# Patient Record
Sex: MALE | Race: WHITE | NOT HISPANIC OR LATINO | Employment: OTHER | URBAN - NONMETROPOLITAN AREA
[De-identification: names, ages, dates, MRNs, and addresses within clinical notes are randomized per-mention and may not be internally consistent; named-entity substitution may affect disease eponyms.]

---

## 2017-01-19 ENCOUNTER — ALLSCRIPTS OFFICE VISIT (OUTPATIENT)
Dept: FAMILY MEDICINE CLINIC | Facility: CLINIC | Age: 62
End: 2017-01-19
Payer: MEDICARE

## 2017-01-19 PROCEDURE — 90837 PSYTX W PT 60 MINUTES: CPT | Performed by: SOCIAL WORKER

## 2017-02-02 ENCOUNTER — ALLSCRIPTS OFFICE VISIT (OUTPATIENT)
Dept: FAMILY MEDICINE CLINIC | Facility: CLINIC | Age: 62
End: 2017-02-02
Payer: MEDICARE

## 2017-02-02 PROCEDURE — 90837 PSYTX W PT 60 MINUTES: CPT | Performed by: SOCIAL WORKER

## 2017-03-23 ENCOUNTER — ALLSCRIPTS OFFICE VISIT (OUTPATIENT)
Dept: FAMILY MEDICINE CLINIC | Facility: CLINIC | Age: 62
End: 2017-03-23
Payer: MEDICARE

## 2017-03-23 ENCOUNTER — APPOINTMENT (OUTPATIENT)
Dept: LAB | Facility: HOSPITAL | Age: 62
End: 2017-03-23
Payer: MEDICARE

## 2017-03-23 DIAGNOSIS — R79.89 OTHER SPECIFIED ABNORMAL FINDINGS OF BLOOD CHEMISTRY: ICD-10-CM

## 2017-03-23 DIAGNOSIS — E78.5 HYPERLIPIDEMIA: ICD-10-CM

## 2017-03-23 LAB — TSH SERPL DL<=0.05 MIU/L-ACNC: 2.58 UIU/ML (ref 0.36–3.74)

## 2017-03-23 PROCEDURE — 90837 PSYTX W PT 60 MINUTES: CPT | Performed by: SOCIAL WORKER

## 2017-03-23 PROCEDURE — 36415 COLL VENOUS BLD VENIPUNCTURE: CPT

## 2017-03-23 PROCEDURE — S0201 PARTIAL HOSPITALIZATION SERV: HCPCS | Performed by: SOCIAL WORKER

## 2017-03-23 PROCEDURE — 84443 ASSAY THYROID STIM HORMONE: CPT

## 2017-03-29 ENCOUNTER — ALLSCRIPTS OFFICE VISIT (OUTPATIENT)
Dept: FAMILY MEDICINE CLINIC | Facility: CLINIC | Age: 62
End: 2017-03-29
Payer: MEDICARE

## 2017-03-29 LAB — HBA1C MFR BLD HPLC: 8.6 %

## 2017-03-29 PROCEDURE — 99213 OFFICE O/P EST LOW 20 MIN: CPT | Performed by: PHYSICIAN ASSISTANT

## 2017-04-06 ENCOUNTER — ALLSCRIPTS OFFICE VISIT (OUTPATIENT)
Dept: FAMILY MEDICINE CLINIC | Facility: CLINIC | Age: 62
End: 2017-04-06
Payer: MEDICARE

## 2017-04-06 PROCEDURE — 90834 PSYTX W PT 45 MINUTES: CPT | Performed by: SOCIAL WORKER

## 2017-08-02 ENCOUNTER — GENERIC CONVERSION - ENCOUNTER (OUTPATIENT)
Dept: OTHER | Facility: OTHER | Age: 62
End: 2017-08-02

## 2017-10-04 ENCOUNTER — ALLSCRIPTS OFFICE VISIT (OUTPATIENT)
Dept: OTHER | Facility: OTHER | Age: 62
End: 2017-10-04

## 2017-10-04 ENCOUNTER — GENERIC CONVERSION - ENCOUNTER (OUTPATIENT)
Dept: OTHER | Facility: OTHER | Age: 62
End: 2017-10-04

## 2017-10-04 DIAGNOSIS — I42.0 DILATED CARDIOMYOPATHY (HCC): ICD-10-CM

## 2017-10-11 ENCOUNTER — GENERIC CONVERSION - ENCOUNTER (OUTPATIENT)
Dept: OTHER | Facility: OTHER | Age: 62
End: 2017-10-11

## 2018-01-10 NOTE — PSYCH
Progress Note  Individual Psychotherapy 60 min minutes provided today  Goals addressed in session:   GOALS- TALKING ABOUT LOSSES WITH FAMILY NAD PROCESSING FEELINGS AROUND THE HOLIDAYS  Art appears and reports feeling very depressed lately, a combo between being off his anti-depressant For 2 weeks due to lack of funds, and the holidays  Art talked bren about his losses over the last 3 yrs- he has not sen his daughter in a year and a half, and his sone has not contacted him in many months  Art expresses how hard it is to be alone  Therapist connected this with the goal to become involved with the [facility], so that he can socialize with others in a positive atmosphere  Art has been accepted into he program and is waiting fo the first time to attend  Current suicide risk is low   Assessment  Mood is depressed- moderately to severely at times, but no SI/HI present  Plan  See in 3 weeks, after xmas-- assess for any worsening of mood  Signatures   Electronically signed by :  Sylwia Mendez; Dec  7 7061  9:39PM EST                       (Author)

## 2018-01-10 NOTE — PROGRESS NOTES
History of Present Illness  Care Coordination Encounter Information:   Type of Encounter: Telephonic    Spoke to Patient  Care Coordination SL Nurse [facility]:   The reason for call is to discuss outreach for follow up/needed services  reached out to patient to see if Adam Kirk, the , has gotten in touch with him yet to reschedule appt  patient states he has not heard from her and he does not have her number  I told him I would call the [facility] to see if Dajuan Conner would be able to get in touch with Adam Bradley to see what is going on  Patient agreeable with that  His only other concern at this time is that he is about to run out of his heart medications and when he tried to get an appt with Dr John Alvarez, pt states office told him nothing is available at this time and to call back  I encouraged him to call back after we end phone call and to make sure he lets them know he needs his prescription called in as he is about to run out  Patients verbally understands and states he will do so  I will call Brianda in the meantime to try to get a hold of Adam Kirk  Late Entry: Called [facility] to speak with Dajuan Conner, was told she is off on Fridays  Called patient back to see if he had spoken with Dr Shawn Argueta office regarding follow up appt and medication refill  no answer, left voicemail for pt to call me back, i did tell him i will call Brianda on Monday as she is not in on Fridays  Will wait for pt to call back  Active Problems    1  Acute sinusitis (461 9) (J01 90)   2  Anxiety (300 00) (F41 9)   3  Chronic obstructive pulmonary disease (496) (J44 9)   4  Depression (311) (F32 9)   5  Dilated cardiomyopathy (425 4) (I42 0)   6  Heart failure, systolic, with acute decompensation (428 23) (I50 23)   7  Hyperlipidemia (272 4) (E78 5)   8  Hypertension (401 9) (I10)   9  Ischemic cardiomyopathy (414 8) (I25 5)   10  Need for influenza vaccination (V04 81) (Z23)   11  Tobacco use (305 1) (Z72 0)   12   Type 2 diabetes mellitus (250 00) (E11 9)    Past Medical History    1  History of Congestive heart failure (428 0) (I50 9)   2  History of congestive cardiomyopathy (V12 59) (Z86 79)   3  History of heartburn (V12 79) (Z87 898)   4  History of myocardial infarction (412) (I25 2)   5  History of shortness of breath (V13 89) (Z87 898)   6  History of wheezing (V12 69) (Z87 898)   7  History of Neck pain (723 1) (M54 2)   8  History of Pneumonia (V12 61)    Surgical History    1  History of Hand Surgery    Family History    1  Family history of arthritis (V17 7) (Z82 61)    2  Family history of diabetes mellitus (V18 0) (Z83 3)   3  Family history of Laryngeal Cancer (V16 2)    4  Family history of diabetes mellitus (V18 0) (Z83 3)    Social History    · Being A Social Drinker   · Current every day smoker (305 1) (F17 200)   · Exercise: Walking   · Former smoker (V15 82) (S87 797)   · No drug use   · Tobacco use (305 1) (Z72 0)   ·     Current Meds    1  Ventolin  (90 Base) MCG/ACT Inhalation Aerosol Solution; INHALE 2 PUFFS   EVERY 4 HOURS AS NEEDED; Therapy: 73QTM1237 to (Last UY:58CGT0563)  Requested for: 42OZR2405 Ordered    2  Citalopram Hydrobromide 20 MG Oral Tablet; take one tablet once daily; Therapy: 40RXJ2105 to (Carli Macedo)  Requested for: 61UNN9779; Last   Rx:04Jan2016 Ordered    3  Aspirin 81 MG Oral Tablet; TAKE 1 TABLET DAILY; Therapy: 62OAK8208 to Recorded    4  Carvedilol 12 5 MG Oral Tablet; TAKE 1 TABLET TWICE DAILY,  WITH MORNING AND   EVENING MEAL; Therapy: 61CCC9329 to (Brandie Murray)  Requested for: 45LAQ5000; Last   Rx:05Jun2014 Ordered    5  FreeStyle InsuLinx Test In Vitro Strip; ONE  EVERY DAY; Therapy: 14PWA6684 to (Last Rx:26Mar2014)  Requested for: 26Mar2014 Ordered    6  Furosemide 40 MG Oral Tablet; TAKE 1 TABLET DAILY AS DIRECTED; Therapy: (NALDTAZD:98LQZ0437) to Recorded   7  Lisinopril 10 MG Oral Tablet; TAKE 1 TABLET DAILY AS DIRECTED;    Therapy: (LKLPRRVH:16AIA6589) to Recorded   8  Nitrostat 0 4 MG Sublingual Tablet Sublingual;   Therapy: (Recorded:04Jan2016) to Recorded    Allergies    1  Penicillins    End of Encounter Meds    1  Ventolin  (90 Base) MCG/ACT Inhalation Aerosol Solution; INHALE 2 PUFFS   EVERY 4 HOURS AS NEEDED; Therapy: 27XVS5938 to (Last WF:74MEY1570)  Requested for: 44MAC9209 Ordered    2  Citalopram Hydrobromide 20 MG Oral Tablet; take one tablet once daily; Therapy: 17DPQ0457 to (Myrla Hollow)  Requested for: 64JXK6825; Last   Rx:04Jan2016 Ordered    3  Aspirin 81 MG Oral Tablet; TAKE 1 TABLET DAILY; Therapy: 74GCZ9845 to Recorded    4  Carvedilol 12 5 MG Oral Tablet; TAKE 1 TABLET TWICE DAILY,  WITH MORNING AND   EVENING MEAL; Therapy: 58WOX3834 to (Rose Mary Carvajal)  Requested for: 58BSK8773; Last   Rx:05Jun2014 Ordered    5  FreeStyle InsuLinx Test In Vitro Strip; ONE  EVERY DAY; Therapy: 06KGR5083 to (Last Rx:26Mar2014)  Requested for: 26Mar2014 Ordered    6  Furosemide 40 MG Oral Tablet; TAKE 1 TABLET DAILY AS DIRECTED; Therapy: (ULKTDIPF:29LYD6624) to Recorded   7  Lisinopril 10 MG Oral Tablet; TAKE 1 TABLET DAILY AS DIRECTED; Therapy: (GZDQKVJC:70MVR5174) to Recorded   8  Nitrostat 0 4 MG Sublingual Tablet Sublingual;   Therapy: (Recorded:04Jan2016) to Recorded    Future Appointments    Date/Time Provider Specialty Site   05/02/2016 09:00 AM Niru Moreno, [address] [address]     Patient Care Team    Care Team Member Role Specialty Office Number   Christa NUNEZ    Cardiology [phone]     Signatures   Electronically signed by : Gilles Biswas RN; Feb 19 2016 10:54AM EST                       (Author)    Electronically signed by : Gilles Biswas RN; Feb 19 2016 11:12AM EST                       (Author)

## 2018-01-10 NOTE — PROGRESS NOTES
History of Present Illness  Care Coordination Encounter Information:   Type of Encounter: Telephonic    Spoke to Other   JESUSITA Lamar  Care Coordination SL Nurse Baltazar Magaña:   The reason for call is to discuss outreach for follow up/needed services  Spoke with Anita Wills LCSW at [facility] about pt financial burden with medications and insurance  She states she will have Ketan Jovel , the , contact the patient to sit with him and fill out insurance paperwork  Name, , and phone number of patient given to Kaycee Vo  Patient called and left voicemail indicating Luci Martinez will be contacting him to help him with his insurance  Will follow up with patient at a later time  Active Problems    1  Acute sinusitis (461 9) (J01 90)   2  Anxiety (300 00) (F41 9)   3  Chronic obstructive pulmonary disease (496) (J44 9)   4  Depression (311) (F32 9)   5  Dilated cardiomyopathy (425 4) (I42 0)   6  Heart failure, systolic, with acute decompensation (428 23) (I50 23)   7  Hyperlipidemia (272 4) (E78 5)   8  Hypertension (401 9) (I10)   9  Ischemic cardiomyopathy (414 8) (I25 5)   10  Need for influenza vaccination (V04 81) (Z23)   11  Tobacco use (305 1) (Z72 0)   12  Type 2 diabetes mellitus (250 00) (E11 9)    Past Medical History    1  History of Congestive heart failure (428 0) (I50 9)   2  History of congestive cardiomyopathy (V12 59) (Z86 79)   3  History of heartburn (V12 79) (Z87 898)   4  History of myocardial infarction (412) (I25 2)   5  History of shortness of breath (V13 89) (Z87 898)   6  History of wheezing (V12 69) (Z87 898)   7  History of Neck pain (723 1) (M54 2)   8  History of Pneumonia (V12 61)    Surgical History    1  History of Hand Surgery    Family History    1  Family history of arthritis (V17 7) (Z82 61)    2  Family history of diabetes mellitus (V18 0) (Z83 3)   3  Family history of Laryngeal Cancer (V16 2)    4   Family history of diabetes mellitus (V18 0) (Z83 3)    Social History    · Being A Social Drinker   · Current every day smoker (305 1) (F17 200)   · Exercise: Walking   · Former smoker (V15 82) (L56 134)   · No drug use   · Tobacco use (305 1) (Z72 0)   ·     Current Meds    1  Ventolin  (90 Base) MCG/ACT Inhalation Aerosol Solution; INHALE 2 PUFFS   EVERY 4 HOURS AS NEEDED; Therapy: 48CWR1450 to (Last MF:21FTC1240)  Requested for: 88TNG1654 Ordered    2  Citalopram Hydrobromide 20 MG Oral Tablet; take one tablet once daily; Therapy: 86TBZ7401 to (Zhen Mercado)  Requested for: 50XBM8844; Last   Rx:04Jan2016 Ordered    3  Aspirin 81 MG Oral Tablet; TAKE 1 TABLET DAILY; Therapy: 09WIO5838 to Recorded    4  Carvedilol 12 5 MG Oral Tablet; TAKE 1 TABLET TWICE DAILY,  WITH MORNING AND   EVENING MEAL; Therapy: 99DVZ9357 to (Miguel Garcia)  Requested for: 94EFL1569; Last   Rx:05Jun2014 Ordered    5  FreeStyle InsuLinx Test In Vitro Strip; ONE  EVERY DAY; Therapy: 56MOF7342 to (Last Rx:26Mar2014)  Requested for: 26Mar2014 Ordered    6  Furosemide 40 MG Oral Tablet; TAKE 1 TABLET DAILY AS DIRECTED; Therapy: (IFPPIMRZ:02GYY7629) to Recorded   7  Lisinopril 10 MG Oral Tablet; TAKE 1 TABLET DAILY AS DIRECTED; Therapy: (IRPYRGTW:41HKF9231) to Recorded   8  Nitrostat 0 4 MG Sublingual Tablet Sublingual;   Therapy: (Recorded:04Jan2016) to Recorded    Allergies    1  Penicillins    End of Encounter Meds    1  Ventolin  (90 Base) MCG/ACT Inhalation Aerosol Solution; INHALE 2 PUFFS   EVERY 4 HOURS AS NEEDED; Therapy: 71MQQ9070 to (Last QX:40GXM0354)  Requested for: 57URU1027 Ordered    2  Citalopram Hydrobromide 20 MG Oral Tablet; take one tablet once daily; Therapy: 75VNI7969 to (Zhen Mercado)  Requested for: 81BBQ4086; Last   Rx:04Jan2016 Ordered    3  Aspirin 81 MG Oral Tablet; TAKE 1 TABLET DAILY; Therapy: 62GKQ9079 to Recorded    4   Carvedilol 12 5 MG Oral Tablet; TAKE 1 TABLET TWICE DAILY,  WITH MORNING AND   EVENING MEAL; Therapy: 03VKQ2923 to (Danay Josephhugh)  Requested for: 12BBH1543; Last   Rx:05Jun2014 Ordered    5  FreeStyle InsuLinx Test In Vitro Strip; ONE  EVERY DAY; Therapy: 35JGX0024 to (Last Rx:26Mar2014)  Requested for: 26Mar2014 Ordered    6  Furosemide 40 MG Oral Tablet; TAKE 1 TABLET DAILY AS DIRECTED; Therapy: (SZWIMQXX:01XXJ3039) to Recorded   7  Lisinopril 10 MG Oral Tablet; TAKE 1 TABLET DAILY AS DIRECTED; Therapy: (PHRYXBPI:23DGQ0425) to Recorded   8  Nitrostat 0 4 MG Sublingual Tablet Sublingual;   Therapy: (DPHSWXDX:45EPH2600) to Recorded    Future Appointments    Date/Time Provider Specialty Site   02/01/2016 09:00 AM Yesika Fernandez, [address] [place] [address]     Patient Care Team    Care Team Member Role Specialty Office Number   Henrietta Kevin NUNEZ    Cardiology [phone]     Signatures   Electronically signed by : Manish Beal RN; Jan 26 2016  3:29PM EST                       (Author)

## 2018-01-11 NOTE — PROGRESS NOTES
History of Present Illness  Care Coordination Encounter Information:   Type of Encounter: Telephonic    Spoke to Patient  Care Coordination SL Nurse Ginger Ruiz:   The reason for call is to discuss outreach for follow up/needed services  Patient states he is doing well at the moment  He reports that he was able to get in touch with the Social Security office, who told him they would be mailing a formal letter to him stating when his child support would be discontinued  Patient was not told the specific date but was told it should be by the end of this month  Patient is still indecisive about [facility] [place]  He reports that [facility], the , gave him a phone number for someone that would be able to help him decrease his monthly rent  He has not called yet but states he will talk to them first before deciding if he would like to continue with [facility] [place]  Patient is currently seeing [facility] every other Wednesday and states he is finding it helpful to talk to someone  He is aware of his appointment with his PCP on 5/2/2016 and has arranged for transportation with [facility]  He is also aware of his appointment with cardiology on 6/1/2016  Patient sounds like he is doing well compared to some of the prior conversations I have had with him  He is in the process of making himself something to eat so I told him I would follow up with him in a couple of weeks  He is agreeable to this  Active Problems    1  Acute sinusitis (461 9) (J01 90)   2  Anxiety (300 00) (F41 9)   3  Chronic obstructive pulmonary disease (496) (J44 9)   4  Depression (311) (F32 9)   5  Dilated cardiomyopathy (425 4) (I42 0)   6  Heart failure, systolic, with acute decompensation (428 23) (I50 23)   7  Hyperlipidemia (272 4) (E78 5)   8  Hypertension (401 9) (I10)   9  Ischemic cardiomyopathy (414 8) (I25 5)   10  Need for influenza vaccination (V04 81) (Z23)   11  Tobacco use (305 1) (Z72 0)   12   Type 2 diabetes mellitus (250 00) (E11 9)    Past Medical History    1  History of Congestive heart failure (428 0) (I50 9)   2  History of congestive cardiomyopathy (V12 59) (Z86 79)   3  History of heartburn (V12 79) (Z87 898)   4  History of myocardial infarction (412) (I25 2)   5  History of shortness of breath (V13 89) (Z87 898)   6  History of wheezing (V12 69) (Z87 898)   7  History of Neck pain (723 1) (M54 2)   8  History of Pneumonia (V12 61)    Surgical History    1  History of Hand Surgery    Family History  Mother    1  Family history of arthritis (V17 7) (Z82 61)  Father    2  Family history of diabetes mellitus (V18 0) (Z83 3)   3  Family history of Laryngeal Cancer (V16 2)  Sister    4  Family history of diabetes mellitus (V18 0) (Z83 3)    Social History    · Being A Social Drinker   · Current every day smoker (305 1) (F17 200)   · Exercise: Walking   · Former smoker (V15 82) (C83 135)   · No drug use   · Tobacco use (305 1) (Z72 0)   ·     Current Meds    1  Ventolin  (90 Base) MCG/ACT Inhalation Aerosol Solution; INHALE 2 PUFFS   EVERY 4 HOURS AS NEEDED; Therapy: 29AHB5209 to (Last NB:12SID1117)  Requested for: 71LKE5791 Ordered    2  Citalopram Hydrobromide 20 MG Oral Tablet; take one tablet once daily; Therapy: 17UHY3442 to (Sabrina Ann)  Requested for: 09OMQ2311; Last   Rx:04Jan2016 Ordered    3  Aspirin 81 MG TABS; TAKE 1 TABLET DAILY; Therapy: 52HKJ2456 to Recorded    4  Furosemide 40 MG Oral Tablet; TAKE 1 TABLET DAILY AS DIRECTED  Requested for:   24GLR1499; Last Rx:29Mar2016 Ordered   5  Nitrostat 0 4 MG Sublingual Tablet Sublingual; DISSOLVE 1 TABLET UNDER THE   TONGUE AS NEEDED FOR CHEST PAIN Q 5 MINUTES X3  IF NO RELIEF   CALL 911  Requested for: 53SXP8942; Last Rx:17Mar2016 Ordered    6  Lisinopril 10 MG Oral Tablet; TAKE 1 TABLET DAILY AS DIRECTED  Requested for:   43BDK9561; Last Rx:29Mar2016 Ordered    7   Carvedilol 3 125 MG Oral Tablet; TAKE 1 TABLET TWICE DAILY,  WITH MORNING AND   EVENING MEAL  Requested for: 11DKK4644; Last Rx:29Mar2016 Ordered    8  FreeStyle InsuLinx Test In Vitro Strip; ONE  EVERY DAY; Therapy: 52AJK6079 to (Last Rx:26Mar2014)  Requested for: 26Mar2014 Ordered   9  MetFORMIN HCl - 1000 MG Oral Tablet; TAKE 1 TABLET TWICE DAILY; Therapy: 76YYP6078 to (Evaluate:15Jun2016); Last Rx:17Mar2016 Ordered    Allergies    1  Penicillins    End of Encounter Meds    1  Ventolin  (90 Base) MCG/ACT Inhalation Aerosol Solution; INHALE 2 PUFFS   EVERY 4 HOURS AS NEEDED; Therapy: 29CMV4602 to (Last NX:07YJZ6230)  Requested for: 86GAQ5114 Ordered    2  Citalopram Hydrobromide 20 MG Oral Tablet; take one tablet once daily; Therapy: 55BCZ0842 to (Juan Daniel Cho)  Requested for: 14OIA1073; Last   Rx:04Jan2016 Ordered    3  Aspirin 81 MG TABS; TAKE 1 TABLET DAILY; Therapy: 47SSK2807 to Recorded    4  Furosemide 40 MG Oral Tablet; TAKE 1 TABLET DAILY AS DIRECTED  Requested for:   75SUL0408; Last Rx:29Mar2016 Ordered   5  Nitrostat 0 4 MG Sublingual Tablet Sublingual; DISSOLVE 1 TABLET UNDER THE   TONGUE AS NEEDED FOR CHEST PAIN Q 5 MINUTES X3  IF NO RELIEF   CALL 911  Requested for: 42TAS9197; Last Rx:17Mar2016 Ordered    6  Lisinopril 10 MG Oral Tablet; TAKE 1 TABLET DAILY AS DIRECTED  Requested for:   26VFB9353; Last Rx:29Mar2016 Ordered    7  Carvedilol 3 125 MG Oral Tablet; TAKE 1 TABLET TWICE DAILY,  WITH MORNING AND   EVENING MEAL  Requested for: 01ATX4423; Last Rx:29Mar2016 Ordered    8  FreeStyle InsuLinx Test In Vitro Strip; ONE  EVERY DAY; Therapy: 61BXY8077 to (Last Rx:26Mar2014)  Requested for: 26Mar2014 Ordered   9  MetFORMIN HCl - 1000 MG Oral Tablet; TAKE 1 TABLET TWICE DAILY; Therapy: 05JND3703 to (Evaluate:15Jun2016); Last Rx:17Mar2016 Ordered    Future Appointments    Date/Time Provider Specialty Site   06/01/2016 11:20 AM ABHINAV Bunn   Cardiology [facility] [place]   05/02/2016 09:00 AM TAMMY Fu [address] [address]     Patient Care Team    Care Team Member Role Specialty Office Number   Blaine Alo NUNEZ    Cardiology [phone]   Electronically signed by : Lee Ann Velasco RN; Apr 28 2016  3:01PM EST                       (Author)

## 2018-01-11 NOTE — PSYCH
Progress Note  Individual Psychotherapy 60 min minutes provided today  Goals addressed in session:   [address]  Art share that he is feeling a little better since his PCP increased his dose of anti-depressant  Art is still going to the [facility] and is finding this very helpful  Art is getting involved in some extra activities through them and is motivated to continue to remain active socially, as he knows how much this helps with his looniness and depression  Art shares that he recently identified a very significant trigger for him- there was a man who was intoxicated at the program and actually came to his house drunk  Art needed to ask him to leave, which he did  Art shares that he recognized that the smell of alcohol reminds him of his  wife as well as times when he was afraid as a child  Therapist reviewed grounding techniques with him and suggested trying tot use imagery for a secure container to place upsetting memories- art is willing ot try this  Current suicide risk is low   Assessment  Mood is a little less depressed- Art is being proactive in remaining socially active  Plan  See biweekly- continue to support processing traumatic events as he is able and maintaining social engagement  Signatures   Electronically signed by : Sandi Hair;  2017  8:13PM EST                       (Author)

## 2018-01-11 NOTE — PSYCH
Progress Note  Individual Psychotherapy 60 min minutes provided today  Goals addressed in session:   [address],  Art relays that he is back on his anti-depressant and feeling a little better, however, he is still very depressed around the holidays  Art talked more about his family and how he misses his son, and is hurt about why he has not contacted him  Art talked about his daughter and his wife  Art shared his questions about how his wife   Art still has a lot of questions about the cause of death  Therapist encouraged Art to talk about his wife's life  Art shared how he really loved Larisa Tracy and how they had many good times together, up until the last couple of years of her life  Art relays that Jesusita's mental status seemed to change, in addition she was drinking a lot more  Art did not have any event that celebrated her life and acknowledges that he never really grieved her death  Art shared that it is helpful to talk about this  Current suicide risk is low   Assessment  Mood is more depressed related to the holidays  No suicidal ideation  Plan  See in 2 weeks- therapist asked Art to consider arranging a memory book and to look for pics of his wife as a way to help him process this loss  Signatures   Electronically signed by :  Sandi Mendez; Dec 24 8891  9:18AM EST                       (Author)

## 2018-01-11 NOTE — PSYCH
Progress Note  Individual Psychotherapy 60 min minutes provided today  Goals addressed in session:   GOALS- PROBLEM SOLVING AND PROCESSING FEELINGS  Art appears and reports feeling depressed- he is currently worried about his electric getting cut off, in addition to not having enough money to get all his heart meds  Art expressed how being in this position makes him feel, and his anger that he has worked hard his whole life and it does not seem fair that when he needs help, he does not qualify  Art is tearful as he shares how he feels judged by others for the position he is in, including his PCP  Therapist affirmed that Art has the right to look for a another Provider if he is not comfortable  Art relays that Jennifer Crane tried to work with [employer] about his electric not getting shut off, but was not able to accomplish this  Therapist recommended that Art bring the form to his cardiologist who he sees next week  Discussed a - Art is in agreement to accepting a - he singed a release and therapist will make a referral   Therapist affirmed to Art that his present position has been out of his control, and that he can be proud of hard he has worked to support his family through the yrs  Therapist also discussed the poss of a volunteer position in a 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

## 2018-01-11 NOTE — PROGRESS NOTES
History of Present Illness  Care Coordination Encounter Information:   Type of Encounter: Telephonic    Spoke to Patient  Care Coordination SL Nurse Pratibha Fleming:   The reason for call is to discuss outreach for follow up/needed services  Reached out to patient to see how he is doing  He states he just got back from the pharmacy and could only afford some of his medications  He was unable to afford his Metformin or Celebrex, which are $18 and $3 respectively  He states he had to sell some of his food stamps to pay for his medications, and now he only has a small amount of food stamps left to go grocery shopping  I attempted to educate him on [employer] and the benefits it can offer to him, but he states the company he is going through is the cheapest one that offers all generic  He states he did leave his PCP office a voicemail to get back to him regarding his Celebrex because that medication is making his disoriented to the time  He will wait for them to call back and if not, will try calling them back tomorrow  He states he will not be able to make his way to the pharmacy now until 4/3/2016 because he does not have any money  He states that Kalina Tucker, the , and Andrew Meléndez, the , have been working with him to help some of the obstacles he has been having  He missed his last appointment with Kalina Tucker on 3/16/2016 because he overslept  He does state he has an upcoming appointment with her next Wednesday 3/23/2016 for an hour and then with Andrew Meléndez for another hour  Patient reports that Andrew Meléndez is trying to help with with some of his financial obstacles, like his bills from [facility] and Kalina Tucker has been working with him for his psychosocial issues  He is just frustrated because he does not qualify for any assistance until he is the age of 71 and because he makes too much money on his social security checks   He is looking for an apartment that is less than $575 so he could save on rent but he says the only one he can find does not have a washer and dryer  He said that would cause more harm than help since he does not drive and would have to carry his laundry to the closest [place] and he will not be able to do that  Patient is dealing with many obstacles, mainly due to not having insurance that he can afford and not qualifying for medical assistance  I encouraged him to keep his appointment with José Miguel Felix and Myra Quick and see if they have any resources or help for him  I also encouraged him to keep his appointment for Dr MONTGOM[facility] since he has not seen his cardiologist in quite some time  He agrees and states he will keep his appointments  I asked patient if it was ok for me to call him next Thursday after his appointment with the social and   He states that would be fine  Will reach out to patient 3/24/2016 to get an update on how he is doing  Active Problems    1  Acute sinusitis (461 9) (J01 90)   2  Anxiety (300 00) (F41 9)   3  Chronic obstructive pulmonary disease (496) (J44 9)   4  Depression (311) (F32 9)   5  Dilated cardiomyopathy (425 4) (I42 0)   6  Heart failure, systolic, with acute decompensation (428 23) (I50 23)   7  Hyperlipidemia (272 4) (E78 5)   8  Hypertension (401 9) (I10)   9  Ischemic cardiomyopathy (414 8) (I25 5)   10  Need for influenza vaccination (V04 81) (Z23)   11  Tobacco use (305 1) (Z72 0)   12  Type 2 diabetes mellitus (250 00) (E11 9)    Past Medical History    1  History of Congestive heart failure (428 0) (I50 9)   2  History of congestive cardiomyopathy (V12 59) (Z86 79)   3  History of heartburn (V12 79) (Z87 898)   4  History of myocardial infarction (412) (I25 2)   5  History of shortness of breath (V13 89) (Z87 898)   6  History of wheezing (V12 69) (Z87 898)   7  History of Neck pain (723 1) (M54 2)   8  History of Pneumonia (V12 61)    Surgical History    1  History of Hand Surgery    Family History    1   Family history of arthritis (V17 7) (Z82 61)    2  Family history of diabetes mellitus (V18 0) (Z83 3)   3  Family history of Laryngeal Cancer (V16 2)    4  Family history of diabetes mellitus (V18 0) (Z83 3)    Social History    · Being A Social Drinker   · Current every day smoker (305 1) (F17 200)   · Exercise: Walking   · Former smoker (V15 82) (S96 283)   · No drug use   · Tobacco use (305 1) (Z72 0)   ·     Current Meds    1  Ventolin  (90 Base) MCG/ACT Inhalation Aerosol Solution; INHALE 2 PUFFS   EVERY 4 HOURS AS NEEDED; Therapy: 00YEX6667 to (Last TZ:70GQM3171)  Requested for: 40ABU9497 Ordered    2  Citalopram Hydrobromide 20 MG Oral Tablet; take one tablet once daily; Therapy: 32ROG1128 to (Carlota Ferguson)  Requested for: 42VEL6540; Last   Rx:04Jan2016 Ordered    3  Aspirin 81 MG Oral Tablet; TAKE 1 TABLET DAILY; Therapy: 38COK6155 to Recorded    4  Furosemide 40 MG Oral Tablet; TAKE 1 TABLET DAILY AS DIRECTED  Requested for:   98UEF2461; Last Rx:17Mar2016 Ordered   5  Nitrostat 0 4 MG Sublingual Tablet Sublingual; DISSOLVE 1 TABLET UNDER THE   TONGUE AS NEEDED FOR CHEST PAIN Q 5 MINUTES X3  IF NO RELIEF   CALL 911  Requested for: 91DEQ7676; Last Rx:17Mar2016 Ordered    6  Lisinopril 10 MG Oral Tablet; TAKE 1 TABLET DAILY AS DIRECTED  Requested for:   89OCK9796; Last Rx:17Mar2016 Ordered    7  Carvedilol 3 125 MG Oral Tablet; TAKE 1 TABLET TWICE DAILY,  WITH MORNING AND   EVENING MEAL  Requested for: 24LBM1903; Last Rx:17Mar2016 Ordered    8  FreeStyle InsuLinx Test In Vitro Strip; ONE  EVERY DAY; Therapy: 26GNS1442 to (Last Rx:26Mar2014)  Requested for: 26Mar2014 Ordered   9  MetFORMIN HCl - 1000 MG Oral Tablet; TAKE 1 TABLET TWICE DAILY; Therapy: 46SPK8899 to (Evaluate:15Jun2016); Last Rx:17Mar2016 Ordered    Allergies    1  Penicillins    End of Encounter Meds    1  Ventolin  (90 Base) MCG/ACT Inhalation Aerosol Solution; INHALE 2 PUFFS   EVERY 4 HOURS AS NEEDED;    Therapy: 06MUO9134 to (Last ESTEVES:07HCV1041)  Requested for: 58PNC6048 Ordered    2  Citalopram Hydrobromide 20 MG Oral Tablet; take one tablet once daily; Therapy: 79PHT6184 to (Arnold Bosch)  Requested for: 42DGA2282; Last   Rx:04Jan2016 Ordered    3  Aspirin 81 MG Oral Tablet; TAKE 1 TABLET DAILY; Therapy: 16DJZ7058 to Recorded    4  Furosemide 40 MG Oral Tablet; TAKE 1 TABLET DAILY AS DIRECTED  Requested for:   28UHL7786; Last Rx:17Mar2016 Ordered   5  Nitrostat 0 4 MG Sublingual Tablet Sublingual; DISSOLVE 1 TABLET UNDER THE   TONGUE AS NEEDED FOR CHEST PAIN Q 5 MINUTES X3  IF NO RELIEF   CALL 911  Requested for: 23RGI3649; Last Rx:17Mar2016 Ordered    6  Lisinopril 10 MG Oral Tablet; TAKE 1 TABLET DAILY AS DIRECTED  Requested for:   95WVL7373; Last Rx:17Mar2016 Ordered    7  Carvedilol 3 125 MG Oral Tablet; TAKE 1 TABLET TWICE DAILY,  WITH MORNING AND   EVENING MEAL  Requested for: 77YNJ2812; Last Rx:17Mar2016 Ordered    8  FreeStyle InsuLinx Test In Vitro Strip; ONE  EVERY DAY; Therapy: 00SDR7157 to (Last Rx:26Mar2014)  Requested for: 26Mar2014 Ordered   9  MetFORMIN HCl - 1000 MG Oral Tablet; TAKE 1 TABLET TWICE DAILY; Therapy: 80GXQ6103 to (Evaluate:15Jun2016); Last Rx:17Mar2016 Ordered    Future Appointments    Date/Time Provider Specialty Site   03/29/2016 08:00 AM ABHINAV Monsivais  Cardiology [facility] CARDIOLOGY [facility]   05/02/2016 09:00 AM Niru Moreno, [address] [place] [address]     Patient Care Team    Care Team Member Role Specialty Office Number   Christa NUNEZ    Cardiology [phone]     Signatures   Electronically signed by : Gilles Biswas RN; Mar 18 2016  1:17PM EST                       (Author)

## 2018-01-11 NOTE — PROGRESS NOTES
History of Present Illness  Care Coordination Encounter Information:   Type of Encounter: Telephonic    Spoke to Patient  Care Coordination SL Nurse [facility]:   The reason for call is to discuss outreach for follow up/needed services  spoke with patient to see if he had picked up his Celexa dose yet, which was ordered 1/4/2016  He states he is having someone pick it up for him next week  I asked if he had spoken to [facility] to Access and he states he will call them again next week because when he tried earlier this week he was put on hold several times and then was hung up on  I will follow up with him on this next week and if he has the same problems, I will give them a call  I asked why he was not able to make Dr Arian Gutierrez appt on 1/18/2016  He states he called too late for [facility] transportation and so he could not make it  He states he will be calling on Monday to reschedule  I told him to make sure as soon as he makes an appointment to call [facility] to have them scheduled also  Patient agrees  Patient states he is unhappy because he received his bill from [employer] for $11,600 and still has a previous bill from [employer] that he is unable to pay  States he is trying everything he can do and is getting nowhere  I told him I would call the , Demetrius Guadarrama, at [address] [place] [address] to see if she has any other suggestions and will call him back  Late Entry: Spoke with Hiro Fuller, from [facility] who states Demetrius Guadarrama is off on Fridays but will be in on Monday  Called patient back to inform him of this  I assured him I will call her Monday and speak to him regarding his situation  He is thankful  I educated him on the importance of seeing Dr Mala Sethi and trying to get in touch with [facility] of Access for his financial issues  Patient states he will call both on Monday and I will follow up with him to see his progress  No other issues or concerns expressed by patient at this time  Will outreach again 1/25/2016  Active Problems    1  Acute sinusitis (461 9) (J01 90)   2  Anxiety (300 00) (F41 9)   3  Chronic obstructive pulmonary disease (496) (J44 9)   4  Depression (311) (F32 9)   5  Dilated cardiomyopathy (425 4) (I42 0)   6  Heart failure, systolic, with acute decompensation (428 23) (I50 23)   7  Hyperlipidemia (272 4) (E78 5)   8  Hypertension (401 9) (I10)   9  Ischemic cardiomyopathy (414 8) (I25 5)   10  Need for influenza vaccination (V04 81) (Z23)   11  Tobacco use (305 1) (Z72 0)   12  Type 2 diabetes mellitus (250 00) (E11 9)    Past Medical History    1  History of Congestive heart failure (428 0) (I50 9)   2  History of congestive cardiomyopathy (V12 59) (Z86 79)   3  History of heartburn (V12 79) (Z87 898)   4  History of myocardial infarction (412) (I25 2)   5  History of shortness of breath (V13 89) (Z87 898)   6  History of wheezing (V12 69) (Z87 898)   7  History of Neck pain (723 1) (M54 2)   8  History of Pneumonia (V12 61)    Surgical History    1  History of Hand Surgery    Family History    1  Family history of arthritis (V17 7) (Z82 61)    2  Family history of diabetes mellitus (V18 0) (Z83 3)   3  Family history of Laryngeal Cancer (V16 2)    4  Family history of diabetes mellitus (V18 0) (Z83 3)    Social History    · Being A Social Drinker   · Current every day smoker (305 1) (F17 200)   · Exercise: Walking   · Former smoker (V15 82) (O21 752)   · No drug use   · Tobacco use (305 1) (Z72 0)   ·     Current Meds    1  Ventolin  (90 Base) MCG/ACT Inhalation Aerosol Solution; INHALE 2 PUFFS   EVERY 4 HOURS AS NEEDED; Therapy: 68ERO6940 to (Last KB:24AUQ7593)  Requested for: 84NON1431 Ordered    2  Citalopram Hydrobromide 20 MG Oral Tablet; take one tablet once daily; Therapy: 58JKQ8058 to (Ale Fat)  Requested for: 36FQA8225; Last   Rx:04Jan2016 Ordered    3  Aspirin 81 MG Oral Tablet; TAKE 1 TABLET DAILY; Therapy: 35AZG0433 to Recorded    4   Carvedilol 12 5 MG Oral Tablet; TAKE 1 TABLET TWICE DAILY,  WITH MORNING AND   EVENING MEAL; Therapy: 01WGO1140 to (Charlene Holland)  Requested for: 54HTF6250; Last   Rx:05Jun2014 Ordered    5  FreeStyle InsuLinx Test In Vitro Strip; ONE  EVERY DAY; Therapy: 77VQE6581 to (Last Rx:26Mar2014)  Requested for: 26Mar2014 Ordered    6  Furosemide 40 MG Oral Tablet; TAKE 1 TABLET DAILY AS DIRECTED; Therapy: (YOIXAWJE:26XAT8673) to Recorded   7  Lisinopril 10 MG Oral Tablet; TAKE 1 TABLET DAILY AS DIRECTED; Therapy: (QOTELFQQ:04EGF0180) to Recorded   8  Nitrostat 0 4 MG Sublingual Tablet Sublingual;   Therapy: (Recorded:04Jan2016) to Recorded    Allergies    1  Penicillins    End of Encounter Meds    1  Ventolin  (90 Base) MCG/ACT Inhalation Aerosol Solution; INHALE 2 PUFFS   EVERY 4 HOURS AS NEEDED; Therapy: 92ALZ9696 to (Last ZT:96OHR0990)  Requested for: 75LHE5485 Ordered    2  Citalopram Hydrobromide 20 MG Oral Tablet; take one tablet once daily; Therapy: 25HFM0273 to (Bertrum Killer)  Requested for: 03IFL5634; Last   Rx:04Jan2016 Ordered    3  Aspirin 81 MG Oral Tablet; TAKE 1 TABLET DAILY; Therapy: 23NHD4869 to Recorded    4  Carvedilol 12 5 MG Oral Tablet; TAKE 1 TABLET TWICE DAILY,  WITH MORNING AND   EVENING MEAL; Therapy: 10QTB2533 to (Charlene Holland)  Requested for: 92SOX5052; Last   Rx:05Jun2014 Ordered    5  FreeStyle InsuLinx Test In Vitro Strip; ONE  EVERY DAY; Therapy: 04WRL6316 to (Last Rx:26Mar2014)  Requested for: 26Mar2014 Ordered    6  Furosemide 40 MG Oral Tablet; TAKE 1 TABLET DAILY AS DIRECTED; Therapy: (MFTGCGVO:01TQG2272) to Recorded   7  Lisinopril 10 MG Oral Tablet; TAKE 1 TABLET DAILY AS DIRECTED; Therapy: (LBYNAOEE:98BGS3724) to Recorded   8   Nitrostat 0 4 MG Sublingual Tablet Sublingual;   Therapy: (SPOZNCMQ:66KRT3312) to Recorded    Future Appointments    Date/Time Provider Specialty Site   02/01/2016 09:00 AM Harjit Hassan, [address] [place] [address] [address]     Patient Care Team    Care Team Member Role Specialty Office Number   Kassandra William NUNEZ    Cardiology [phone]   Electronically signed by : Major Singh RN; Jan 22 2016 10:44AM EST                       (Author)    Electronically signed by : Major Singh RN; Jan 22 2016 10:48AM EST                       (Author)

## 2018-01-12 NOTE — PROGRESS NOTES
History of Present Illness  Care Coordination Encounter Information:   Type of Encounter: Telephonic    Spoke to Patient  Care Coordination SL Nurse [facility]:   The reason for call is to discuss outreach for follow up/needed services  I was reached out by Brent Mercedes, HF Care Coordinator, who states this patient reached out to her stating he has no electricity  She states patient told her his electric bill is $171 and he does not get paid until a later time  She has not spoken to patient since December of 2015 but I did update her on how I have been speaking with him, as well as Doni Smith from case management and Brianda from social work  I informed Forest that I will reach out to Doni Smith to see if there is any assistance we can get for this patient regarding his electricity bill  I reached out to Doni Smith and updated her on my conversation with Forest  She believes [facility] electric company does help patients who have high medical bills  She states she will talk to her supervisor, Maranda Sahu, and get call me back with information  I will wait to hear from her before calling patient  Late Entry: 5/19/2016 1455- Received a call back from Doni Smith stating she spoke with [facility] customer service  They told her they could not give her any information since she was not the account sims and would have to speak directly to the patient  I obtained their number from Doni Smith and thanked her for the update  I attempted to call patient who no answer  I did leave a voicemail and will attempt to call again tomorrow, 5/20/2016, if he does not call me back  Active Problems    1  Acute sinusitis (461 9) (J01 90)   2  Anxiety (300 00) (F41 9)   3  Chronic obstructive pulmonary disease (496) (J44 9)   4  Depression (311) (F32 9)   5  Dilated cardiomyopathy (425 4) (I42 0)   6  Encounter for screening for malignant neoplasm of colon (V76 51) (Z12 11)   7  Heart failure, systolic, with acute decompensation (428 23) (I50 23)   8   Hyperlipidemia (272  4) (E78 5)   9  Hypertension (401 9) (I10)   10  Ischemic cardiomyopathy (414 8) (I25 5)   11  Need for influenza vaccination (V04 81) (Z23)   12  Tobacco use (305 1) (Z72 0)   13  Type 2 diabetes mellitus (250 00) (E11 9)    Past Medical History    1  History of Congestive heart failure (428 0) (I50 9)   2  History of congestive cardiomyopathy (V12 59) (Z86 79)   3  History of heartburn (V12 79) (Z87 898)   4  History of myocardial infarction (412) (I25 2)   5  History of shortness of breath (V13 89) (Z87 898)   6  History of wheezing (V12 69) (Z87 898)   7  History of Neck pain (723 1) (M54 2)   8  History of Pneumonia (V12 61)    Surgical History    1  History of Hand Surgery    Family History  Mother    1  Family history of arthritis (V17 7) (Z82 61)  Father    2  Family history of diabetes mellitus (V18 0) (Z83 3)   3  Family history of Laryngeal Cancer (V16 2)  Sister    4  Family history of diabetes mellitus (V18 0) (Z83 3)    Social History    · Being A Social Drinker   · Current every day smoker (305 1) (F17 200)   · Exercise: Walking   · Former smoker (V15 82) (U16 681)   · No drug use   · Tobacco use (305 1) (Z72 0)   ·     Current Meds    1  Ventolin  (90 Base) MCG/ACT Inhalation Aerosol Solution; INHALE 2 PUFFS   EVERY 4 HOURS AS NEEDED; Therapy: 44DAO7601 to (Last SZ:81JVQ0406)  Requested for: 43HQL6370 Ordered    2  Citalopram Hydrobromide 20 MG Oral Tablet; take one tablet once daily; Therapy: 48VJN4719 to (Michael Herman)  Requested for: 85MEV2228; Last   Rx:14Tds4123 Ordered    3  Aspirin 81 MG TABS; TAKE 1 TABLET DAILY; Therapy: 90PPB1935 to Recorded    4  Furosemide 40 MG Oral Tablet; TAKE 1 TABLET DAILY AS DIRECTED  Requested for:   71HER9194; Last Rx:29Mar2016 Ordered   5   Nitrostat 0 4 MG Sublingual Tablet Sublingual; DISSOLVE 1 TABLET UNDER THE   TONGUE AS NEEDED FOR CHEST PAIN Q 5 MINUTES X3  IF NO RELIEF   CALL 911  Requested for: 54RIB9464; Last Rx:17Mar2016 Ordered    6  Lisinopril 10 MG Oral Tablet; TAKE 1 TABLET DAILY AS DIRECTED  Requested for:   74JFP9025; Last Rx:29Mar2016 Ordered    7  Carvedilol 3 125 MG Oral Tablet; TAKE 1 TABLET TWICE DAILY,  WITH MORNING AND   EVENING MEAL  Requested for: 27WQY3069; Last Rx:29Mar2016 Ordered    8  FreeStyle InsuLinx Test In Vitro Strip; ONE  EVERY DAY; Therapy: 58FMC5016 to (Last Rx:26Mar2014)  Requested for: 26Mar2014 Ordered   9  MetFORMIN HCl - 1000 MG Oral Tablet; TAKE 1 TABLET TWICE DAILY; Therapy: 61LGO5602 to (Artist Tonya)  Requested for: 27GSA1711; Last   HH:58PVP0478 Ordered    Allergies    1  Penicillins    End of Encounter Meds    1  Ventolin  (90 Base) MCG/ACT Inhalation Aerosol Solution; INHALE 2 PUFFS   EVERY 4 HOURS AS NEEDED; Therapy: 42SAJ0635 to (Last FQ:89SCD9759)  Requested for: 49RMC0311 Ordered    2  Citalopram Hydrobromide 20 MG Oral Tablet; take one tablet once daily; Therapy: 33UTM2137 to (Artist Macon)  Requested for: 01SZT6896; Last   Rx:86Zwr8380 Ordered    3  Aspirin 81 MG TABS; TAKE 1 TABLET DAILY; Therapy: 66GIS1702 to Recorded    4  Furosemide 40 MG Oral Tablet; TAKE 1 TABLET DAILY AS DIRECTED  Requested for:   38DJA3758; Last Rx:29Mar2016 Ordered   5  Nitrostat 0 4 MG Sublingual Tablet Sublingual; DISSOLVE 1 TABLET UNDER THE   TONGUE AS NEEDED FOR CHEST PAIN Q 5 MINUTES X3  IF NO RELIEF   CALL 911  Requested for: 29KGM0662; Last Rx:17Mar2016 Ordered    6  Lisinopril 10 MG Oral Tablet; TAKE 1 TABLET DAILY AS DIRECTED  Requested for:   80LIH8461; Last Rx:29Mar2016 Ordered    7  Carvedilol 3 125 MG Oral Tablet; TAKE 1 TABLET TWICE DAILY,  WITH MORNING AND   EVENING MEAL  Requested for: 18UZS6236; Last Rx:29Mar2016 Ordered    8  FreeStyle InsuLinx Test In Vitro Strip; ONE  EVERY DAY; Therapy: 16TQF3528 to (Last Rx:26Mar2014)  Requested for: 26Mar2014 Ordered   9  MetFORMIN HCl - 1000 MG Oral Tablet; TAKE 1 TABLET TWICE DAILY;    Therapy: 65DWZ6362 to (Vivian Jackson)  Requested for: 35NEV6238; Last   SY:19QPV0056 Ordered    Future Appointments    Date/Time Provider Specialty Site   06/01/2016 11:20 AM ABHINAV San  Cardiology [facility] CARDIOLOGY [place]   08/02/2016 10:00 AM Carie Curry, [address] [place] [address]     Patient Care Team    Care Team Member Role Specialty Office Number   Ольга NUNEZ    Cardiology [phone]     Signatures   Electronically signed by : Blake Crandall RN; May 19 2016  2:06PM EST                       (Author)    Electronically signed by : Blake Crandall RN; May 19 2016  3:05PM EST                       (Author)

## 2018-01-12 NOTE — PSYCH
Progress Note  Individual Psychotherapy 30 min minutes provided today  Goals addressed in session:   GOALS- Processing feelings/ exploring resources  Art shares that his  has been more helpful lately, as he will be taking Art to a food back in the area  Art has decided that for now, if he can specific with the  about he can do to help him, he will keep him  Art did speak up to him about what he was saying that was so upsetting  Art accomplished the goal of seeing a new PCP today, which helped him feel much better as well  Art expressing his frustration that when he gets Medicare, it will actually cost him more than what he is paying fr health care now  Art struggles with a feeling of defeat that he can never "cath a break"  Discussed a referral to the [employer]- apparently this is already in the works  Current suicide risk is low   Assessment  Mood is stable with some depression and frustration with his difficult financial situation  Plan  see in 3 weeks- For support and ongoing encouragement to become involved ed in a positive social activity and to keep exploring options fo help  Signatures   Electronically signed by :  Sandi Youngblood; Aug 14 5922  3:39PM EST                       (Author)

## 2018-01-12 NOTE — MISCELLANEOUS
Message  Call from pt asking for refill of cardiac meds  Had last OV with Dr Dontrell Muñoz 8/1/14  Had CHF and NSTEMI 11/15 and Dr Dontrell Muñoz saw pt on consult during that admission  Pt informs that he has had trouble finding transportation to doctor visits  He does have appt 3/29/16 to see Dr Dontrell Muñoz in the [place] office  Paged and spoke with Dr Dontrell Muñoz re: refill of cardiac meds  Per Dr Dontrell Muñoz okay to fill meds as he has been taking them  Spoke with pt and he confirmed meds and dosages  See updated med list in [employer]  Rxs sent to pharmacy  Active Problems    1  Acute sinusitis (461 9) (J01 90)   2  Anxiety (300 00) (F41 9)   3  Chronic obstructive pulmonary disease (496) (J44 9)   4  Depression (311) (F32 9)   5  Dilated cardiomyopathy (425 4) (I42 0)   6  Heart failure, systolic, with acute decompensation (428 23) (I50 23)   7  Hyperlipidemia (272 4) (E78 5)   8  Hypertension (401 9) (I10)   9  Ischemic cardiomyopathy (414 8) (I25 5)   10  Need for influenza vaccination (V04 81) (Z23)   11  Tobacco use (305 1) (Z72 0)   12  Type 2 diabetes mellitus (250 00) (E11 9)    Current Meds   1  Aspirin 81 MG Oral Tablet; TAKE 1 TABLET DAILY; Therapy: 22KBP2319 to Recorded   2  Carvedilol 3 125 MG Oral Tablet; TAKE 1 TABLET TWICE DAILY,  WITH MORNING AND   EVENING MEAL  Requested for: 59ZGH0893; Last Rx:17Mar2016; Status: ACTIVE -   Retrospective By Protocol Authorization Ordered   3  Citalopram Hydrobromide 20 MG Oral Tablet; take one tablet once daily; Therapy: 02STH1364 to (Jesus Doctor)  Requested for: 09RVH1699; Last   Rx:04Jan2016 Ordered   4  FreeStyle InsuLinx Test In Vitro Strip; ONE  EVERY DAY; Therapy: 00BTN7031 to (Last Rx:26Mar2014)  Requested for: 26Mar2014 Ordered   5  Furosemide 40 MG Oral Tablet; TAKE 1 TABLET DAILY AS DIRECTED  Requested for:   30DHM4394; Last Rx:17Mar2016; Status: ACTIVE - Retrospective By Protocol   Authorization Ordered   6   Lisinopril 10 MG Oral Tablet; TAKE 1 TABLET DAILY AS DIRECTED  Requested for:   93JBL7879; Last Rx:17Mar2016; Status: ACTIVE - Retrospective Authorization Ordered   7  MetFORMIN HCl - 1000 MG Oral Tablet; TAKE 1 TABLET TWICE DAILY; Therapy: 29XUP5444 to (Evaluate:15Jun2016); Last Rx:17Mar2016 Ordered   8  Nitrostat 0 4 MG Sublingual Tablet Sublingual; DISSOLVE 1 TABLET UNDER THE   TONGUE AS NEEDED FOR CHEST PAIN Q 5 MINUTES X3  IF NO RELIEF   CALL 911  Requested for: 55SWE1133; Last Rx:17Mar2016; Status: ACTIVE -   Retrospective Authorization Ordered   9  Ventolin  (90 Base) MCG/ACT Inhalation Aerosol Solution; INHALE 2 PUFFS   EVERY 4 HOURS AS NEEDED; Therapy: 29XNI6639 to (Last DV:92LEY3768)  Requested for: 12DFZ6934 Ordered    Allergies    1   Penicillins    Signatures   Electronically signed by : Tina Abreu RN; Mar 17 2016  1:41PM EST                       (Author)

## 2018-01-12 NOTE — PROGRESS NOTES
History of Present Illness  Care Coordination Encounter Information:   Type of Encounter: Telephonic    Spoke to Other   Cassius Leung, case management  Care Coordination  Nurse Ginger Ruiz:   The reason for call is to discuss outreach for follow up/needed services  spoke with Cassius Leung from case management with [facility] who states she has met with patient and spoke with supervisor and they are getting him therapy for his depression  She does state that, unfortunately, patient does not qualify for medical assistance and can not get Medicare for another two years, but she is hoping the therapy will help him be more positive as his depression is affecting his lifestyle tremendously  She states they are doing what they can to help this patient  I am very thankful and did let her know  I will follow up with patient to see how he is doing  Active Problems    1  Acute sinusitis (461 9) (J01 90)   2  Anxiety (300 00) (F41 9)   3  Chronic obstructive pulmonary disease (496) (J44 9)   4  Depression (311) (F32 9)   5  Dilated cardiomyopathy (425 4) (I42 0)   6  Heart failure, systolic, with acute decompensation (428 23) (I50 23)   7  Hyperlipidemia (272 4) (E78 5)   8  Hypertension (401 9) (I10)   9  Ischemic cardiomyopathy (414 8) (I25 5)   10  Need for influenza vaccination (V04 81) (Z23)   11  Tobacco use (305 1) (Z72 0)   12  Type 2 diabetes mellitus (250 00) (E11 9)    Past Medical History    1  History of Congestive heart failure (428 0) (I50 9)   2  History of congestive cardiomyopathy (V12 59) (Z86 79)   3  History of heartburn (V12 79) (Z87 898)   4  History of myocardial infarction (412) (I25 2)   5  History of shortness of breath (V13 89) (Z87 898)   6  History of wheezing (V12 69) (Z87 898)   7  History of Neck pain (723 1) (M54 2)   8  History of Pneumonia (V12 61)    Surgical History    1  History of Hand Surgery    Family History    1  Family history of arthritis (V17 7) (Z82 61)    2  Family history of diabetes mellitus (V18 0) (Z83 3)   3  Family history of Laryngeal Cancer (V16 2)    4  Family history of diabetes mellitus (V18 0) (Z83 3)    Social History    · Being A Social Drinker   · Current every day smoker (305 1) (F17 200)   · Exercise: Walking   · Former smoker (V15 82) (B79 403)   · No drug use   · Tobacco use (305 1) (Z72 0)   ·     Current Meds    1  Ventolin  (90 Base) MCG/ACT Inhalation Aerosol Solution; INHALE 2 PUFFS   EVERY 4 HOURS AS NEEDED; Therapy: 26CXK6578 to (Last VL:46BQV7137)  Requested for: 39MDD0789 Ordered    2  Citalopram Hydrobromide 20 MG Oral Tablet; take one tablet once daily; Therapy: 49AMU5105 to (Lizzy Staple)  Requested for: 78ORY2224; Last   Rx:04Jan2016 Ordered    3  Aspirin 81 MG Oral Tablet; TAKE 1 TABLET DAILY; Therapy: 34WYB7393 to Recorded    4  Carvedilol 12 5 MG Oral Tablet; TAKE 1 TABLET TWICE DAILY,  WITH MORNING AND   EVENING MEAL; Therapy: 91UPH2404 to (Ardelle Maple)  Requested for: 39ZAA0697; Last   Rx:05Jun2014 Ordered    5  FreeStyle InsuLinx Test In Vitro Strip; ONE  EVERY DAY; Therapy: 44TFB8287 to (Last Rx:26Mar2014)  Requested for: 26Mar2014 Ordered    6  Furosemide 40 MG Oral Tablet; TAKE 1 TABLET DAILY AS DIRECTED; Therapy: (YKPNXQHN:26PWU8580) to Recorded   7  Lisinopril 10 MG Oral Tablet; TAKE 1 TABLET DAILY AS DIRECTED; Therapy: (AKYBGLBK:18VMD6039) to Recorded   8  Nitrostat 0 4 MG Sublingual Tablet Sublingual;   Therapy: (Recorded:04Jan2016) to Recorded    Allergies    1  Penicillins    End of Encounter Meds    1  Ventolin  (90 Base) MCG/ACT Inhalation Aerosol Solution; INHALE 2 PUFFS   EVERY 4 HOURS AS NEEDED; Therapy: 87HLQ0621 to (Last YO:87WDY0398)  Requested for: 90FGF3823 Ordered    2  Citalopram Hydrobromide 20 MG Oral Tablet; take one tablet once daily; Therapy: 74REX4330 to (Lizzy Staple)  Requested for: 61ZOF4552; Last   Rx:04Jan2016 Ordered    3   Aspirin 81 MG Oral Tablet; TAKE 1 TABLET DAILY; Therapy: 10LQC2045 to Recorded    4  Carvedilol 12 5 MG Oral Tablet; TAKE 1 TABLET TWICE DAILY,  WITH MORNING AND   EVENING MEAL; Therapy: 90FNK3236 to (Hilda Mejía)  Requested for: 15DKA4732; Last   Rx:05Jun2014 Ordered    5  FreeStyle InsuLinx Test In Vitro Strip; ONE  EVERY DAY; Therapy: 08IQU2128 to (Last Rx:26Mar2014)  Requested for: 26Mar2014 Ordered    6  Furosemide 40 MG Oral Tablet; TAKE 1 TABLET DAILY AS DIRECTED; Therapy: (OCBQYTBA:83QVH8450) to Recorded   7  Lisinopril 10 MG Oral Tablet; TAKE 1 TABLET DAILY AS DIRECTED; Therapy: (QFXRRBOM:72HMU2451) to Recorded   8  Nitrostat 0 4 MG Sublingual Tablet Sublingual;   Therapy: (Recorded:04Jan2016) to Recorded    Future Appointments    Date/Time Provider Specialty Site   05/02/2016 09:00 AM [place] [address]     Patient Care Team    Care Team Member Role Specialty Office Number   Yadira NUNEZ    Cardiology [phone]     Signatures   Electronically signed by : Faby Valdes RN; Mar  2 2016  2:48PM EST                       (Author)

## 2018-01-12 NOTE — PROGRESS NOTES
History of Present Illness  Care Coordination Encounter Information:   Type of Encounter: Telephonic    Spoke to Patient  Care Coordination SL Nurse [facility]:   The reason for call is to discuss outreach for follow up/needed services  spoke with patient to follow up on  ruchi  Patient states he had an appointment with Tonia Garnica 2/10/2016 but she cancelled and told him she would get back to him to schedule another appointment  Patient was able to get his Celexa prescription filled and is taking that now  He states he has still gotten nowhere with [facility] calling him back  I told him I had spoken to the , Melissa, regarding that issue and hopefully she relayed that message to Ajay  I told him I would call back next week to see if Ajay has gotten in touch with him for a follow up appointment  He is thankful for that  States he has no other issues or concerns at this time  Will follow up next week  Active Problems    1  Acute sinusitis (461 9) (J01 90)   2  Anxiety (300 00) (F41 9)   3  Chronic obstructive pulmonary disease (496) (J44 9)   4  Depression (311) (F32 9)   5  Dilated cardiomyopathy (425 4) (I42 0)   6  Heart failure, systolic, with acute decompensation (428 23) (I50 23)   7  Hyperlipidemia (272 4) (E78 5)   8  Hypertension (401 9) (I10)   9  Ischemic cardiomyopathy (414 8) (I25 5)   10  Need for influenza vaccination (V04 81) (Z23)   11  Tobacco use (305 1) (Z72 0)   12  Type 2 diabetes mellitus (250 00) (E11 9)    Past Medical History    1  History of Congestive heart failure (428 0) (I50 9)   2  History of congestive cardiomyopathy (V12 59) (Z86 79)   3  History of heartburn (V12 79) (Z87 898)   4  History of myocardial infarction (412) (I25 2)   5  History of shortness of breath (V13 89) (Z87 898)   6  History of wheezing (V12 69) (Z87 898)   7  History of Neck pain (723 1) (M54 2)   8  History of Pneumonia (V12 61)    Surgical History    1   History of Hand Surgery    Family History    1  Family history of arthritis (V17 7) (Z82 61)    2  Family history of diabetes mellitus (V18 0) (Z83 3)   3  Family history of Laryngeal Cancer (V16 2)    4  Family history of diabetes mellitus (V18 0) (Z83 3)    Social History    · Being A Social Drinker   · Current every day smoker (305 1) (F17 200)   · Exercise: Walking   · Former smoker (V15 82) (V40 981)   · No drug use   · Tobacco use (305 1) (Z72 0)   ·     Current Meds    1  Ventolin  (90 Base) MCG/ACT Inhalation Aerosol Solution; INHALE 2 PUFFS   EVERY 4 HOURS AS NEEDED; Therapy: 59FDH1856 to (Last FT:26KUF1865)  Requested for: 66MRU7075 Ordered    2  Citalopram Hydrobromide 20 MG Oral Tablet; take one tablet once daily; Therapy: 39RGH2399 to (Mary Jo Miramontes)  Requested for: 19JXS6257; Last   Rx:04Jan2016 Ordered    3  Aspirin 81 MG Oral Tablet; TAKE 1 TABLET DAILY; Therapy: 48VTA8032 to Recorded    4  Carvedilol 12 5 MG Oral Tablet; TAKE 1 TABLET TWICE DAILY,  WITH MORNING AND   EVENING MEAL; Therapy: 63ZIE8349 to (Soo Higuera)  Requested for: 39BFM1455; Last   Rx:05Jun2014 Ordered    5  FreeStyle InsuLinx Test In Vitro Strip; ONE  EVERY DAY; Therapy: 08MOW1368 to (Last Rx:26Mar2014)  Requested for: 26Mar2014 Ordered    6  Furosemide 40 MG Oral Tablet; TAKE 1 TABLET DAILY AS DIRECTED; Therapy: (RZLOABBU:61KYD2915) to Recorded   7  Lisinopril 10 MG Oral Tablet; TAKE 1 TABLET DAILY AS DIRECTED; Therapy: (WXFZHQSO:84BKV1477) to Recorded   8  Nitrostat 0 4 MG Sublingual Tablet Sublingual;   Therapy: (Recorded:04Jan2016) to Recorded    Allergies    1  Penicillins    End of Encounter Meds    1  Ventolin  (90 Base) MCG/ACT Inhalation Aerosol Solution; INHALE 2 PUFFS   EVERY 4 HOURS AS NEEDED; Therapy: 95XNV9120 to (Last VV:00XKA7473)  Requested for: 07MOT8786 Ordered    2  Citalopram Hydrobromide 20 MG Oral Tablet; take one tablet once daily;    Therapy: 95NVS4530 to (Matheus Gee)  Requested for: 91YII9043; Last   Rx:04Jan2016 Ordered    3  Aspirin 81 MG Oral Tablet; TAKE 1 TABLET DAILY; Therapy: 14KWR6490 to Recorded    4  Carvedilol 12 5 MG Oral Tablet; TAKE 1 TABLET TWICE DAILY,  WITH MORNING AND   EVENING MEAL; Therapy: 51QLD9161 to (Marshall Vegas)  Requested for: 70JLC4088; Last   Rx:05Jun2014 Ordered    5  FreeStyle InsuLinx Test In Vitro Strip; ONE  EVERY DAY; Therapy: 35BDQ1112 to (Last Rx:26Mar2014)  Requested for: 26Mar2014 Ordered    6  Furosemide 40 MG Oral Tablet; TAKE 1 TABLET DAILY AS DIRECTED; Therapy: (HHTLVZZV:63QRR1650) to Recorded   7  Lisinopril 10 MG Oral Tablet; TAKE 1 TABLET DAILY AS DIRECTED; Therapy: (TXSZENLM:21LVA7051) to Recorded   8  Nitrostat 0 4 MG Sublingual Tablet Sublingual;   Therapy: (Recorded:04Jan2016) to Recorded    Future Appointments    Date/Time Provider Specialty Site   05/02/2016 09:00 AM Nayla Nieves, [address] [place] [address]     Patient Care Team    Care Team Member Role Specialty Office Number   Janae NUNEZ    Cardiology [phone]     Signatures   Electronically signed by : Preston Argueta RN; Feb 11 2016  1:47PM EST                       (Author)

## 2018-01-12 NOTE — PSYCH
History  The patient is being seen for an initial evaluation of depression  The patient is currently experiencing symptoms  depressed mood loss of interests fatigue sense of failure Symptoms:  suicidal ideation  Onset was gradual 2012 day(s) ago  Onset followed personal stress, family stressors, financial stressors, work stressors, anxiety/worry, a disability and wife's suicie in 2012, additional stress due to 2 heart attacks in 2015, along with multiple stressors  The symptoms occur frequently  He describes this as moderate in severity and unchanged  Exacerbating factors:  emotional stress, family stressors and job stressors  No relieving factors are noted  Associated symptoms:  social difficulties, employment difficulties, financial difficulties and limited to no social support  He describes this as moderate in severity and unchanged  Current treatment includes selective serotonin reuptake inhibitors and anti-depresasnt- however, abhilash effected reported  The patient is not currently being treated for this problem  By report, there is good compliance with treatment and poor tolerance of treatment  The initial diagnosis of depression was week(s) ago  Initial presentation included depressed mood, loss of interest, fatigue and sense of failure  Since diagnosis the disease has been unchanged  Recently, the disease has been unchanged  Pertinent medical history:  depression, recent personal loss, bereavement issues and 2 heart attacks in 2015  Family history:  successful suicide and wife 2012  The patient is currently able to do activities of daily living without limitations, unable to work and able to participate in limited social functioning  The patient is being seen for follow-up of anxiety  The patient reports no change in the condition  Interval symptoms:  stable anxiety, stable restlessness and stable depression     Associated symptoms: no grandiosity, no racing thoughts, no periods of euphoria, no hallucinations and no suicidal ideation  Medications:  the patient is adherent to his medication regimen and the patient complains of medication side effects  Smoking: He does not use tobacco  Recently quit smoking  Interval Symptoms: stable depressed mood, stable loss of interest or pleasure in activities, stable loss of energy, stable feelings of worthlessness, stable feelings of guilt and stable anxiety  Associated symptoms include: social difficulties and financial difficulties, but no thoughts of suicide, no delusions, no racing thoughts, no auditory hallucinations, no periods of excess energy, no visual hallucinations, no periods of euphoria and no difficulty with activities of daily living  Social Support: the patient does not have good social support  Medications: the patient is adherent with his medication regimen  the patient complains of medication side effects  Additional History: reports that since taking the anti-depressant, he can't sleep at night, only during the day  The patient is being seen for initial psychiatric evaluation  The history is reported by the patient  He was referred by a primary care provider, Whitley Josue  The reasons for the evaluation include establishing a psychiatric diagnosis and establishing a new psychiatric provider  Past evaluation has included psychiatry evaluation  This problem has not been previously evaluated  Past treatment has included mood stabilizers  This problem has not been previously treated  Suicidal risk:  no suicidal thoughts, no suicidal intention, no suicide attempt(s) and no organized plans  Homicidal risk:  no homicidal thoughts, no homicidal intention, no homicide attempt(s), no organized plan and no command hallucinations  Active Problems    1  Acute sinusitis (461 9) (J01 90)   2  Anxiety (300 00) (F41 9)   3  Chronic obstructive pulmonary disease (496) (J44 9)   4  Depression (311) (F32 9)   5   Dilated cardiomyopathy (425  4) (I42 0)   6  Heart failure, systolic, with acute decompensation (428 23) (I50 23)   7  Hyperlipidemia (272 4) (E78 5)   8  Hypertension (401 9) (I10)   9  Ischemic cardiomyopathy (414 8) (I25 5)   10  Need for influenza vaccination (V04 81) (Z23)   11  Tobacco use (305 1) (Z72 0)   12  Type 2 diabetes mellitus (250 00) (E11 9)    Past Medical History    1  History of Congestive heart failure (428 0) (I50 9)   2  History of congestive cardiomyopathy (V12 59) (Z86 79)   3  History of heartburn (V12 79) (Z87 898)   4  History of myocardial infarction (412) (I25 2)   5  History of shortness of breath (V13 89) (Z87 898)   6  History of wheezing (V12 69) (Z87 898)   7  History of Neck pain (723 1) (M54 2)   8  History of Pneumonia (V12 61)    Past Psychiatric History    Past Psychiatric History: None  Family Psych History    1  Family history of arthritis (V17 7) (Z82 61)    2  Family history of diabetes mellitus (V18 0) (Z83 3)   3  Family history of Laryngeal Cancer (V16 2)    4  Family history of diabetes mellitus (V18 0) (Z83 3)    Social History    · Being A Social Drinker   · Current every day smoker (305 1) (F17 200)   · Exercise: Walking   · Former smoker (V15 82) (Q07 512)   · No drug use   · Tobacco use (305 1) (Z72 0)   ·     Current Meds   1  Aspirin 81 MG Oral Tablet; TAKE 1 TABLET DAILY; Therapy: 26RBV7672 to Recorded   2  Carvedilol 12 5 MG Oral Tablet; TAKE 1 TABLET TWICE DAILY,  WITH MORNING AND   EVENING MEAL; Therapy: 65DDE0274 to (Eda Schroeder)  Requested for: 57ZHR4941; Last   Rx:05Jun2014 Ordered   3  Citalopram Hydrobromide 20 MG Oral Tablet; take one tablet once daily; Therapy: 43HCZ1638 to (Gerlean Gosselin)  Requested for: 15AQN2012; Last   Rx:04Jan2016 Ordered   4  FreeStyle InsuLinx Test In Vitro Strip; ONE  EVERY DAY; Therapy: 78WYX9348 to (Last Rx:26Mar2014)  Requested for: 26Mar2014 Ordered   5   Furosemide 40 MG Oral Tablet; TAKE 1 TABLET DAILY AS DIRECTED; Therapy: (HIIXNCAP:84EQO5666) to Recorded   6  Lisinopril 10 MG Oral Tablet; TAKE 1 TABLET DAILY AS DIRECTED; Therapy: (YFAWITWM:40PPW6778) to Recorded   7  Nitrostat 0 4 MG Sublingual Tablet Sublingual;   Therapy: (Recorded:19Jgp2431) to Recorded   8  Ventolin  (90 Base) MCG/ACT Inhalation Aerosol Solution; INHALE 2 PUFFS   EVERY 4 HOURS AS NEEDED; Therapy: 38IEP0075 to (Last TR:15TQO0125)  Requested for: 42JEQ4519 Ordered    Allergies    1  Penicillins    DSM    Provisional Diagnosis: Depressive Disorder, NOS  Anxiety Disorder, NOS  Assessment    1  Depression (311) (F32 9)   2  Anxiety (300 00) (F41 9)    Toñito is an intelligent man who is able to verbalize his thoughts and feelings well  Toñito states that his main stressors are financial and that his main goal of coming in for therapy is fo help solving the problems related to lack of resources  However, in the course of the evaluation, it is clear that Toñito has endured multiple serious losses that may also be continuing to his anxiety and depression  The losses began in 2012, as stated above, when is wife committed suicide  IN addition, he has been estranged form his daughter, who has CP, and he adult son did not invite him to his wedding  By the end of the evaluation, Toñito agreed that it would also be helpful to talk to someone about these issues, as well  Plan   See weekly for psychotherapy, in addition, casemangaament support will also be needed  Future Appointments    Date/Time Provider Specialty Site   40/09/0165 72:94 PM Evelio Madrid LSW  [address]   05/02/2016 09:00 AM Tone Azul, [address] [place] [address]     Signatures   Electronically signed by :  Zaynab Benavides [place]; Mar 12 9471  1:22PM EST                       (Author)

## 2018-01-13 VITALS
BODY MASS INDEX: 28.04 KG/M2 | SYSTOLIC BLOOD PRESSURE: 128 MMHG | WEIGHT: 185 LBS | HEART RATE: 87 BPM | TEMPERATURE: 96.8 F | OXYGEN SATURATION: 97 % | HEIGHT: 68 IN | DIASTOLIC BLOOD PRESSURE: 78 MMHG | RESPIRATION RATE: 17 BRPM

## 2018-01-13 NOTE — PSYCH
Progress Note  Individual Psychotherapy 60 min minutes provided today  Goals addressed in session:   GOALS- COping skills to manage feelings of depression and hopelessness  Art relays that it has been a difficult 2 weeks- finances are very strained especially since food stamps have been cut  Art did receive a call from [facility] [address]- therapist encouraged Art that having a casemanageer will be helpful a number of ways-   Therapist affirmed to Art that he is doing all he can  Therapist recommended that Art use journaling as a way to process his feelings  Therapist informed Art that it has just come to [facility] attention, that therapists' psychotherapy notes are accessible to other providers- and that the last one, in which he expressed "feeling judged" by his PCP, was also accessed  Art was not surprised as he shared that his cardiologist also told him that he had read the psychotherapy note  THerapist assured Art that this is being corrected and apologized to him for this privacy breach  Art asked to see a diff PCP- therapist arranged fr him to see Fidel Euceda  Current suicide risk is low   Assessment  Mood is depressed and anxious, largely related to Art's financial circumstances  Plan   See in 2 weeks- Art will see Fidel Euceda for an initial visit on the same day  Signatures   Electronically signed by :  Onur Nicholson [place]; Jun 11 375  4:02PM EST                       (Author)

## 2018-01-13 NOTE — PROGRESS NOTES
History of Present Illness  Care Coordination Encounter Information:   Type of Encounter: Telephonic    Spoke to Patient  Care Coordination SL Nurse [employer]:   The reason for call is to discuss outreach for follow up/needed services  Spoke with patient who is clearly upset about his financial status regarding his medical bills  He states his cardiology office called him and made him aware of his outstanding bills  Per patient, they told him they would still see him but patient does not think he will be able to see the doctor  Patient is tearful and states he just does not know what to do  He tried calling his sister, who has custody of his daughter, but claims she just screamed at him because she is not getting child support from him  He has tried calling his son, who lives in [place], several times and he does not answer his phone  Patient denies any thoughts of hurting himself stating "I do want to live " I reassured him by letting him know Glen Ryder was in the process of talking to the billing department to see what his status was and if we could do anything to help him  I confirmed his PCP appointment on 5/2/2016 and patient states he will call transportation to make sure he has a ride for that day  I told patient I will call him back as soon as I get information regarding his bills  Patient is okay with that  Active Problems    1  Acute sinusitis (461 9) (J01 90)   2  Anxiety (300 00) (F41 9)   3  Chronic obstructive pulmonary disease (496) (J44 9)   4  Depression (311) (F32 9)   5  Dilated cardiomyopathy (425 4) (I42 0)   6  Heart failure, systolic, with acute decompensation (428 23) (I50 23)   7  Hyperlipidemia (272 4) (E78 5)   8  Hypertension (401 9) (I10)   9  Ischemic cardiomyopathy (414 8) (I25 5)   10  Need for influenza vaccination (V04 81) (Z23)   11  Tobacco use (305 1) (Z72 0)   12  Type 2 diabetes mellitus (250 00) (E11 9)    Past Medical History    1   History of Congestive heart failure (428 0) (I50 9)   2  History of congestive cardiomyopathy (V12 59) (Z86 79)   3  History of heartburn (V12 79) (Z87 898)   4  History of myocardial infarction (412) (I25 2)   5  History of shortness of breath (V13 89) (Z87 898)   6  History of wheezing (V12 69) (Z87 898)   7  History of Neck pain (723 1) (M54 2)   8  History of Pneumonia (V12 61)    Surgical History    1  History of Hand Surgery    Family History    1  Family history of arthritis (V17 7) (Z82 61)    2  Family history of diabetes mellitus (V18 0) (Z83 3)   3  Family history of Laryngeal Cancer (V16 2)    4  Family history of diabetes mellitus (V18 0) (Z83 3)    Social History    · Being A Social Drinker   · Current every day smoker (305 1) (F17 200)   · Exercise: Walking   · Former smoker (V15 82) (Y75 431)   · No drug use   · Tobacco use (305 1) (Z72 0)   ·     Current Meds    1  Ventolin  (90 Base) MCG/ACT Inhalation Aerosol Solution; INHALE 2 PUFFS   EVERY 4 HOURS AS NEEDED; Therapy: 03XUW5549 to (Last RN:42NYQ7733)  Requested for: 21PPO1973 Ordered    2  Citalopram Hydrobromide 20 MG Oral Tablet; take one tablet once daily; Therapy: 04DDO5412 to (02 37 15 52 25)  Requested for: 37WHO3638; Last   Rx:04Jan2016 Ordered    3  Aspirin 81 MG Oral Tablet; TAKE 1 TABLET DAILY; Therapy: 50GSI5365 to Recorded    4  Furosemide 40 MG Oral Tablet; TAKE 1 TABLET DAILY AS DIRECTED  Requested for:   95KDV5303; Last Rx:17Mar2016 Ordered   5  Nitrostat 0 4 MG Sublingual Tablet Sublingual; DISSOLVE 1 TABLET UNDER THE   TONGUE AS NEEDED FOR CHEST PAIN Q 5 MINUTES X3  IF NO RELIEF   CALL 911  Requested for: 98UDP0935; Last Rx:17Mar2016 Ordered    6  Lisinopril 10 MG Oral Tablet; TAKE 1 TABLET DAILY AS DIRECTED  Requested for:   76XXY8841; Last Rx:17Mar2016 Ordered    7  Carvedilol 3 125 MG Oral Tablet; TAKE 1 TABLET TWICE DAILY,  WITH MORNING AND   EVENING MEAL  Requested for: 42LHE5709; Last Rx:17Mar2016 Ordered    8   FreeStyle InsuLinx Test In Vitro Strip; ONE  EVERY DAY; Therapy: 09FXY9649 to (Last Rx:26Mar2014)  Requested for: 26Mar2014 Ordered   9  MetFORMIN HCl - 1000 MG Oral Tablet; TAKE 1 TABLET TWICE DAILY; Therapy: 74LZL9828 to (Evaluate:15Jun2016); Last Rx:17Mar2016 Ordered    Allergies    1  Penicillins    End of Encounter Meds    1  Ventolin  (90 Base) MCG/ACT Inhalation Aerosol Solution; INHALE 2 PUFFS   EVERY 4 HOURS AS NEEDED; Therapy: 04DLO6196 to (Last WC:76HCH9163)  Requested for: 37SWC6119 Ordered    2  Citalopram Hydrobromide 20 MG Oral Tablet; take one tablet once daily; Therapy: 75IDH3810 to (Talon Leigh)  Requested for: 67FYQ9633; Last   Rx:04Jan2016 Ordered    3  Aspirin 81 MG Oral Tablet; TAKE 1 TABLET DAILY; Therapy: 64XRK5441 to Recorded    4  Furosemide 40 MG Oral Tablet; TAKE 1 TABLET DAILY AS DIRECTED  Requested for:   56TUL6625; Last Rx:17Mar2016 Ordered   5  Nitrostat 0 4 MG Sublingual Tablet Sublingual; DISSOLVE 1 TABLET UNDER THE   TONGUE AS NEEDED FOR CHEST PAIN Q 5 MINUTES X3  IF NO RELIEF   CALL 911  Requested for: 86MAK4344; Last Rx:17Mar2016 Ordered    6  Lisinopril 10 MG Oral Tablet; TAKE 1 TABLET DAILY AS DIRECTED  Requested for:   07IUC8750; Last Rx:17Mar2016 Ordered    7  Carvedilol 3 125 MG Oral Tablet; TAKE 1 TABLET TWICE DAILY,  WITH MORNING AND   EVENING MEAL  Requested for: 12SQF2550; Last Rx:17Mar2016 Ordered    8  FreeStyle InsuLinx Test In Vitro Strip; ONE  EVERY DAY; Therapy: 20UWL1462 to (Last Rx:26Mar2014)  Requested for: 26Mar2014 Ordered   9  MetFORMIN HCl - 1000 MG Oral Tablet; TAKE 1 TABLET TWICE DAILY; Therapy: 18ZJI2240 to (Evaluate:15Jun2016); Last Rx:17Mar2016 Ordered    Future Appointments    Date/Time Provider Specialty Site   03/29/2016 08:00 AM ABHINAV Silva   Cardiology [facility] CARDIOLOGY [facility]   05/02/2016 09:00 AM Marj Mendoza [address] [place] [address]     Patient Care Team    Care Team Member Role Specialty Office Number   Hardik NUNEZ    Cardiology [phone]   Electronically signed by : Carmen Morin RN; Mar 24 2016 11:43AM EST                       (Author)

## 2018-01-13 NOTE — PSYCH
Progress Note  Individual Psychotherapy 30 min minutes provided today  Goals addressed in session:   GOALS- Connecting to community resources/ processing feelings  Toñito came to the session late due to his physical health appt running over  Art appears and reports feeling more depressed lately, as he has not had money to obtain all his prescriptions- he has been pit f his antidepressant for about 3 weeks and he is feeling the difference  in addition, the holidays are very difficult bc he is not with family- he has not heard from his son in a number of months  Art began to cry about this and then stated he tapan rather not talk about this  Therapist helped Art fill out the rest of his [employer] application  Art is looking forward to going tot he [employer] and has been meeting w a casemager regularly about this  Current suicide risk is low   Assessment  Mood is more  depressed which seems to be a combo of running out of medication and the holidays  Plan  See in 2 weeks- therapist reminded Art of an agency that may be able to help w his meds  Signatures   Electronically signed by :  Sandi Mendez; Nov 26 0010  5:10PM EST                       (Author)

## 2018-01-13 NOTE — PROGRESS NOTES
History of Present Illness  Care Coordination Encounter Information:   Type of Encounter: Telephonic    Spoke to Patient  Care Coordination  Nurse [address] [place] [address] [address]:   The reason for call is to discuss outreach for follow up/needed services  Spoke with patient who states he is doing okay  I offered to give him [employer] contact information so he could talk to them about his electricity bill  He states he had spoken to them yesterday and they gave him a three day extension  I asked him how late he was on his payments, to which he replied he is only half a payment late  He states his total bill was $687 and he has given them $100 already and needs to pay the rest within the next three days  His child support ended last month and so he did have additional funds this month  With that money, he bought diabetes medications, diabetic testing supplies, some light bulbs that he needed, and a can opener  He was not expecting his electric bill to be more than $75  He does not get paid until 6/3/2016 but has some phone calls he is going to make to see if he can get any help in paying for his electric bill  I asked him if all his medications are paid for now, to which he stated they are not  He states he took the last of his Furosemide, Lisinopril, and Carvedilol this morning  These medications will cost him $9 at [facility], which he has, but he does not have the money for transportation to get these medications  I will call the  at [facility] to see if patient could be mailed a taxi voucher or get any other type of assistance to be able to obtain his medications  I did relay this information to patient, as far as myself calling to see what assistance I can get for him  He is thankful  Jean Paul Wagner, RN Case Manager at [facility] was called and left a voicemail regarding this patient  I explained any assistance she can provide me would be beneficial  I will wait for call back and update patient  Active Problems    1  Acute sinusitis (461 9) (J01 90)   2  Anxiety (300 00) (F41 9)   3  Chronic obstructive pulmonary disease (496) (J44 9)   4  Depression (311) (F32 9)   5  Dilated cardiomyopathy (425 4) (I42 0)   6  Encounter for screening for malignant neoplasm of colon (V76 51) (Z12 11)   7  Heart failure, systolic, with acute decompensation (428 23) (I50 23)   8  Hyperlipidemia (272 4) (E78 5)   9  Hypertension (401 9) (I10)   10  Ischemic cardiomyopathy (414 8) (I25 5)   11  Need for influenza vaccination (V04 81) (Z23)   12  Tobacco use (305 1) (Z72 0)   13  Type 2 diabetes mellitus (250 00) (E11 9)    Past Medical History    1  History of Congestive heart failure (428 0) (I50 9)   2  History of congestive cardiomyopathy (V12 59) (Z86 79)   3  History of heartburn (V12 79) (Z87 898)   4  History of myocardial infarction (412) (I25 2)   5  History of shortness of breath (V13 89) (Z87 898)   6  History of wheezing (V12 69) (Z87 898)   7  History of Neck pain (723 1) (M54 2)   8  History of Pneumonia (V12 61)    Surgical History    1  History of Hand Surgery    Family History  Mother    1  Family history of arthritis (V17 7) (Z82 61)  Father    2  Family history of diabetes mellitus (V18 0) (Z83 3)   3  Family history of Laryngeal Cancer (V16 2)  Sister    4  Family history of diabetes mellitus (V18 0) (Z83 3)    Social History    · Being A Social Drinker   · Current every day smoker (305 1) (F17 200)   · Exercise: Walking   · Former smoker (V15 82) (B34 754)   · No drug use   · Tobacco use (305 1) (Z72 0)   ·     Current Meds    1  Ventolin  (90 Base) MCG/ACT Inhalation Aerosol Solution; INHALE 2 PUFFS   EVERY 4 HOURS AS NEEDED; Therapy: 55SUE2267 to (Last BA:00BMI1819)  Requested for: 40SSO2606 Ordered    2  Citalopram Hydrobromide 20 MG Oral Tablet; take one tablet once daily; Therapy: 96WXO8214 to (John Citizen)  Requested for: 68KCL3684; Last   Rx:02May2016 Ordered    3   Aspirin 81 MG TABS; TAKE 1 TABLET DAILY; Therapy: 42FYH0768 to Recorded    4  Furosemide 40 MG Oral Tablet; TAKE 1 TABLET DAILY AS DIRECTED  Requested for:   06LHK8095; Last Rx:2016 Ordered   5  Nitrostat 0 4 MG Sublingual Tablet Sublingual; DISSOLVE 1 TABLET UNDER THE   TONGUE AS NEEDED FOR CHEST PAIN Q 5 MINUTES X3  IF NO RELIEF   CALL 911  Requested for: 56MVY3887; Last Rx:2016 Ordered    6  Lisinopril 10 MG Oral Tablet; TAKE 1 TABLET DAILY AS DIRECTED  Requested for:   13TAK2619; Last Rx:2016 Ordered    7  Carvedilol 3 125 MG Oral Tablet; TAKE 1 TABLET TWICE DAILY,  WITH MORNING AND   EVENING MEAL  Requested for: 49TIX8446; Last Rx:2016 Ordered    8  FreeStyle InsuLinx Test In Vitro Strip; ONE  EVERY DAY; Therapy: 34UVO9740 to (Last Rx:2014)  Requested for: 2014 Ordered   9  MetFORMIN HCl - 1000 MG Oral Tablet; TAKE 1 TABLET TWICE DAILY; Therapy: 78CJA3392 to (Portia Kanner)  Requested for: 27LLI1464; Last   K88RVY0631 Ordered    Allergies    1  Penicillins    End of Encounter Meds    1  Ventolin  (90 Base) MCG/ACT Inhalation Aerosol Solution; INHALE 2 PUFFS   EVERY 4 HOURS AS NEEDED; Therapy: 68NUV4967 to (Last LI:19CZV5252)  Requested for: 97CXP6175 Ordered    2  Citalopram Hydrobromide 20 MG Oral Tablet; take one tablet once daily; Therapy: 35VFE0091 to (Portia Kanner)  Requested for: 99IKN2539; Last   Rx:97Epy8189 Ordered    3  Aspirin 81 MG TABS; TAKE 1 TABLET DAILY; Therapy: 14QXU8189 to Recorded    4  Furosemide 40 MG Oral Tablet; TAKE 1 TABLET DAILY AS DIRECTED  Requested for:   86OXK4356; Last Rx:2016 Ordered   5  Nitrostat 0 4 MG Sublingual Tablet Sublingual; DISSOLVE 1 TABLET UNDER THE   TONGUE AS NEEDED FOR CHEST PAIN Q 5 MINUTES X3  IF NO RELIEF   CALL 911  Requested for: 09PWN4058; Last Rx:2016 Ordered    6  Lisinopril 10 MG Oral Tablet; TAKE 1 TABLET DAILY AS DIRECTED  Requested for:   21YLQ1171; Last Rx:2016 Ordered    7  Carvedilol 3 125 MG Oral Tablet; TAKE 1 TABLET TWICE DAILY,  WITH MORNING AND   EVENING MEAL  Requested for: 13FGZ9318; Last Rx:29Mar2016 Ordered    8  FreeStyle InsuLinx Test In Vitro Strip; ONE  EVERY DAY; Therapy: 37HLG0308 to (Last Rx:26Mar2014)  Requested for: 26Mar2014 Ordered   9  MetFORMIN HCl - 1000 MG Oral Tablet; TAKE 1 TABLET TWICE DAILY; Therapy: 76CRF0108 to (Painting Slight)  Requested for: 26MRV9923; Last   JK:05TOV0755 Ordered    Future Appointments    Date/Time Provider Specialty Site   06/01/2016 11:20 AM ABHINAV Park  Cardiology [facility] CARDIOLOGY [facility]   08/02/2016 10:00 AM Rony Still, [address] [place] [address]     Patient Care Team    Care Team Member Role Specialty Office Number   Hardik NUNEZ    Cardiology [phone]     Signatures   Electronically signed by : Carmen Morin RN; May 20 2016 10:31AM EST                       (Author)

## 2018-01-13 NOTE — PROGRESS NOTES
History of Present Illness  Care Coordination Encounter Information:   Type of Encounter: Telephonic    Spoke to Other   [facility] [address]   Care Coordination  Nurse Storm Fear:   The reason for call is to discuss outreach for follow up/needed services  Reached out to [address] twice now, left voicemail to talk to Melissa, LCSW  Will wait for callback  Active Problems    1  Acute sinusitis (461 9) (J01 90)   2  Anxiety (300 00) (F41 9)   3  Chronic obstructive pulmonary disease (496) (J44 9)   4  Depression (311) (F32 9)   5  Dilated cardiomyopathy (425 4) (I42 0)   6  Heart failure, systolic, with acute decompensation (428 23) (I50 23)   7  Hyperlipidemia (272 4) (E78 5)   8  Hypertension (401 9) (I10)   9  Ischemic cardiomyopathy (414 8) (I25 5)   10  Need for influenza vaccination (V04 81) (Z23)   11  Tobacco use (305 1) (Z72 0)   12  Type 2 diabetes mellitus (250 00) (E11 9)    Past Medical History    1  History of Congestive heart failure (428 0) (I50 9)   2  History of congestive cardiomyopathy (V12 59) (Z86 79)   3  History of heartburn (V12 79) (Z87 898)   4  History of myocardial infarction (412) (I25 2)   5  History of shortness of breath (V13 89) (Z87 898)   6  History of wheezing (V12 69) (Z87 898)   7  History of Neck pain (723 1) (M54 2)   8  History of Pneumonia (V12 61)    Surgical History    1  History of Hand Surgery    Family History    1  Family history of arthritis (V17 7) (Z82 61)    2  Family history of diabetes mellitus (V18 0) (Z83 3)   3  Family history of Laryngeal Cancer (V16 2)    4  Family history of diabetes mellitus (V18 0) (Z83 3)    Social History    · Being A Social Drinker   · Current every day smoker (305 1) (F17 200)   · Exercise: Walking   · Former smoker (V15 82) (R69 116)   · No drug use   · Tobacco use (305 1) (Z72 0)   ·     Current Meds    1   Ventolin  (90 Base) MCG/ACT Inhalation Aerosol Solution; INHALE 2 PUFFS   EVERY 4 HOURS AS NEEDED; Therapy: 64EEG2172 to (Last VR:15LDW5335)  Requested for: 89TPB3649 Ordered    2  Citalopram Hydrobromide 20 MG Oral Tablet; take one tablet once daily; Therapy: 13JAE4517 to (Jayjay Chandra)  Requested for: 47ZEE8657; Last   Rx:04Jan2016 Ordered    3  Aspirin 81 MG Oral Tablet; TAKE 1 TABLET DAILY; Therapy: 34QXZ9234 to Recorded    4  Carvedilol 12 5 MG Oral Tablet; TAKE 1 TABLET TWICE DAILY,  WITH MORNING AND   EVENING MEAL; Therapy: 95CXQ0160 to (Turner Jackson)  Requested for: 74SJF3908; Last   Rx:05Jun2014 Ordered    5  FreeStyle InsuLinx Test In Vitro Strip; ONE  EVERY DAY; Therapy: 87RRE0972 to (Last Rx:26Mar2014)  Requested for: 26Mar2014 Ordered    6  Furosemide 40 MG Oral Tablet; TAKE 1 TABLET DAILY AS DIRECTED; Therapy: (PLFLJECC:52BTO9977) to Recorded   7  Lisinopril 10 MG Oral Tablet; TAKE 1 TABLET DAILY AS DIRECTED; Therapy: (DWCMBWXO:14DID5275) to Recorded   8  Nitrostat 0 4 MG Sublingual Tablet Sublingual;   Therapy: (Recorded:04Jan2016) to Recorded    Allergies    1  Penicillins    End of Encounter Meds    1  Ventolin  (90 Base) MCG/ACT Inhalation Aerosol Solution; INHALE 2 PUFFS   EVERY 4 HOURS AS NEEDED; Therapy: 93NHM2072 to (Last SZ:91OKL7822)  Requested for: 53KLP3817 Ordered    2  Citalopram Hydrobromide 20 MG Oral Tablet; take one tablet once daily; Therapy: 20MFB4807 to (Jayjay Chandra)  Requested for: 32KAV0951; Last   Rx:04Jan2016 Ordered    3  Aspirin 81 MG Oral Tablet; TAKE 1 TABLET DAILY; Therapy: 47XUG6759 to Recorded    4  Carvedilol 12 5 MG Oral Tablet; TAKE 1 TABLET TWICE DAILY,  WITH MORNING AND   EVENING MEAL; Therapy: 81KHK7220 to (Turner Jackson)  Requested for: 59UJV3672; Last   Rx:05Jun2014 Ordered    5  FreeStyle InsuLinx Test In Vitro Strip; ONE  EVERY DAY; Therapy: 21RHF2486 to (Last Rx:26Mar2014)  Requested for: 26Mar2014 Ordered    6  Furosemide 40 MG Oral Tablet; TAKE 1 TABLET DAILY AS DIRECTED;    Therapy: (KSSLPYNW:27FAL4234) to Recorded   7  Lisinopril 10 MG Oral Tablet; TAKE 1 TABLET DAILY AS DIRECTED; Therapy: (IWOXGIRE:66RYQ0028) to Recorded   8  Nitrostat 0 4 MG Sublingual Tablet Sublingual;   Therapy: (CKWCQLFF:70DKR3305) to Recorded    Future Appointments    Date/Time Provider Specialty Site   02/01/2016 09:00 AM [facility], [address] [place] [address]     Patient Care Team    Care Team Member Role Specialty Office Number   Hardik NUNEZ    Cardiology [phone]     Signatures   Electronically signed by : Carmen Morin RN; Jan 25 2016  1:01PM EST                       (Author)

## 2018-01-13 NOTE — PROGRESS NOTES
History of Present Illness  Care Coordination Encounter Information:   Type of Encounter: Telephonic    Spoke to Patient  Care Coordination SL Nurse [facility]:   The reason for call is to discuss outreach for follow up/needed services  Left voicemail for patient to see if he needs any extra help other than what Mauricio Hunt, the , is doing for him  I would also like to talk to him about following up with his cardiologist since he was a no show at his last appointment  I will attempt to call again next week if patient does not call me  Active Problems    1  Acute sinusitis (461 9) (J01 90)   2  Anxiety (300 00) (F41 9)   3  Chronic obstructive pulmonary disease (496) (J44 9)   4  Depression (311) (F32 9)   5  Dilated cardiomyopathy (425 4) (I42 0)   6  Heart failure, systolic, with acute decompensation (428 23) (I50 23)   7  Hyperlipidemia (272 4) (E78 5)   8  Hypertension (401 9) (I10)   9  Ischemic cardiomyopathy (414 8) (I25 5)   10  Need for influenza vaccination (V04 81) (Z23)   11  Tobacco use (305 1) (Z72 0)   12  Type 2 diabetes mellitus (250 00) (E11 9)    Past Medical History    1  History of Congestive heart failure (428 0) (I50 9)   2  History of congestive cardiomyopathy (V12 59) (Z86 79)   3  History of heartburn (V12 79) (Z87 898)   4  History of myocardial infarction (412) (I25 2)   5  History of shortness of breath (V13 89) (Z87 898)   6  History of wheezing (V12 69) (Z87 898)   7  History of Neck pain (723 1) (M54 2)   8  History of Pneumonia (V12 61)    Surgical History    1  History of Hand Surgery    Family History    1  Family history of arthritis (V17 7) (Z82 61)    2  Family history of diabetes mellitus (V18 0) (Z83 3)   3  Family history of Laryngeal Cancer (V16 2)    4   Family history of diabetes mellitus (V18 0) (Z83 3)    Social History    · Being A Social Drinker   · Current every day smoker (305 1) (F17 200)   · Exercise: Walking   · Former smoker (V15 82) (T24 268)   · No drug use   · Tobacco use (305 1) (Z72 0)   ·     Current Meds    1  Ventolin  (90 Base) MCG/ACT Inhalation Aerosol Solution; INHALE 2 PUFFS   EVERY 4 HOURS AS NEEDED; Therapy: 96AQU3305 to (Last QC:55JGZ8362)  Requested for: 49CRK2543 Ordered    2  Citalopram Hydrobromide 20 MG Oral Tablet; take one tablet once daily; Therapy: 86AKD5345 to ([phone])  Requested for: 38ADF5821; Last   Rx:04Jan2016 Ordered    3  Aspirin 81 MG Oral Tablet; TAKE 1 TABLET DAILY; Therapy: 69VXK6286 to Recorded    4  Carvedilol 12 5 MG Oral Tablet; TAKE 1 TABLET TWICE DAILY,  WITH MORNING AND   EVENING MEAL; Therapy: 88BIH6280 to (Estelita Liu)  Requested for: 58DAP5919; Last   Rx:05Jun2014 Ordered    5  FreeStyle InsuLinx Test In Vitro Strip; ONE  EVERY DAY; Therapy: 73VQJ8650 to (Last Rx:26Mar2014)  Requested for: 26Mar2014 Ordered    6  Furosemide 40 MG Oral Tablet; TAKE 1 TABLET DAILY AS DIRECTED; Therapy: (MJRXVBLF:97HEF9292) to Recorded   7  Lisinopril 10 MG Oral Tablet; TAKE 1 TABLET DAILY AS DIRECTED; Therapy: (UXBOLJKQ:68CMD0038) to Recorded   8  Nitrostat 0 4 MG Sublingual Tablet Sublingual;   Therapy: (Recorded:04Jan2016) to Recorded    Allergies    1  Penicillins    End of Encounter Meds    1  Ventolin  (90 Base) MCG/ACT Inhalation Aerosol Solution; INHALE 2 PUFFS   EVERY 4 HOURS AS NEEDED; Therapy: 86RAZ0465 to (Last DP:16ZKQ7706)  Requested for: 53ISH0053 Ordered    2  Citalopram Hydrobromide 20 MG Oral Tablet; take one tablet once daily; Therapy: 16HTR0481 to ([phone])  Requested for: 78HCX6583; Last   Rx:04Jan2016 Ordered    3  Aspirin 81 MG Oral Tablet; TAKE 1 TABLET DAILY; Therapy: 92JVO1035 to Recorded    4  Carvedilol 12 5 MG Oral Tablet; TAKE 1 TABLET TWICE DAILY,  WITH MORNING AND   EVENING MEAL; Therapy: 16BOO5094 to (Estelita Liu)  Requested for: 60RTC5529; Last   Rx:05Jun2014 Ordered    5   FreeStyle InsuLinx Test In Vitro Strip; ONE EVERY DAY; Therapy: 63QSJ3280 to (Last Rx:26Mar2014)  Requested for: 26Mar2014 Ordered    6  Furosemide 40 MG Oral Tablet; TAKE 1 TABLET DAILY AS DIRECTED; Therapy: (BYMBAO:67OCI0466) to Recorded   7  Lisinopril 10 MG Oral Tablet; TAKE 1 TABLET DAILY AS DIRECTED; Therapy: (EMHCYDGI:65KHR9756) to Recorded   8  Nitrostat 0 4 MG Sublingual Tablet Sublingual;   Therapy: (Recorded:04Jan2016) to Recorded    Future Appointments    Date/Time Provider Specialty Site   05/02/2016 09:00 AM Santana Ayoub, [address] [place] [address]     Patient Care Team    Care Team Member Role Specialty Office Number   Alice NUNEZ    Cardiology [phone]     Signatures   Electronically signed by : Angie Gustafson RN; Mar  4 2016 11:27AM EST                       (Author)

## 2018-01-14 NOTE — PROGRESS NOTES
History of Present Illness  Care Coordination Encounter Information:   Type of Encounter: Telephonic    Spoke to Patient and Other   Latha Vargas   Care Coordination SL Nurse Ravindra Ledesma:   The reason for call is to discuss outreach for follow up/needed services  Reached out to patient to see if he was able to get his medications over the weekend  Patient states he still does not have his Lisinopril, Furosemide, and Carvedilol  I stated I will attempt to call the RN Case Manager at [facility] again to see if they can be of any help  I did speak with Latha Vargas, , at [facility] who states she will deliver the medications to the patient's home if they can be filled at [address] [place] [address] in [place]  I will call [facility] in [facility] [address], which is where patient gets his medications filled, and check to see if this can be done  I spoke with [address] [place] [address] in [facility], who states I should be able to get these medications filled in any [facility]  I spoke with the pharmacist at the [address] [place] [address] in [place], who states he will have all three medications ready by 4pm today  I relayed this information to Latha Vargas, who states she will be able to deliver them to patient tomorrow morning before 8 am  I relayed this information to patient  The patient also states he spoke with [facility], who states they can give him an extension on his bill if they have a form signed by a doctor or a nurse practitioner  He asked me if I would be able to sign the form for him  I relayed that I am a Registered Nurse so I can not sign the needed form  He stated he did talk to someone at the [address] [place] [address] and they said they would call [facility] but he has not heard from them  I called the [facility] and spoke with Kala Ortiz, the [place], who stated she received the medical form from [facility] and faxed it back to them on Friday with the provider, Tamar Jordan, signature   I called patient back and updated him with his information and told him to call [employer] to make sure they received this form  He states he will call tomorrow morning  He thanked me for my time  Late Entry: I received this email for Hubert Lama stating patient does not meet criteria for extension from [employer]:   Tono Davenport,  I just wanted to throw out a reminder for these [employer] forms Kiran Daniels is requesting  Patients need to meet medical criteria for these forms to be completed, in which he doesn't  [employer] is not authorizing these forms after more than one time use, that's what our office has been told  I did complete the form, but it will not be an ongoing request    Thank you,   Tamar Frank    I attempted to call patient back to relay this information to him and stress that this will be the only time [employer] will authorize these medical forms  However, patient did not answer his phone  I will attempt to call at a later time  Late Entry: patient updated on P[employer] authorization forms and he verbally understands that he will be responsible in paying his future electric bills on time  Active Problems    1  Acute sinusitis (461 9) (J01 90)   2  Anxiety (300 00) (F41 9)   3  Chronic obstructive pulmonary disease (496) (J44 9)   4  Depression (311) (F32 9)   5  Dilated cardiomyopathy (425 4) (I42 0)   6  Encounter for screening for malignant neoplasm of colon (V76 51) (Z12 11)   7  Heart failure, systolic, with acute decompensation (428 23) (I50 23)   8  Hyperlipidemia (272 4) (E78 5)   9  Hypertension (401 9) (I10)   10  Ischemic cardiomyopathy (414 8) (I25 5)   11  Need for influenza vaccination (V04 81) (Z23)   12  Tobacco use (305 1) (Z72 0)   13  Type 2 diabetes mellitus (250 00) (E11 9)    Past Medical History    1  History of Congestive heart failure (428 0) (I50 9)   2  History of congestive cardiomyopathy (V12 59) (Z86 79)   3  History of heartburn (V12 79) (Z87 898)   4  History of myocardial infarction (412) (I25 2)   5   History of shortness of breath (V13 89) (R18 891)   6  History of wheezing (V12 69) (Z87 898)   7  History of Neck pain (723 1) (M54 2)   8  History of Pneumonia (V12 61)    Surgical History    1  History of Hand Surgery    Family History  Mother    1  Family history of arthritis (V17 7) (Z82 61)  Father    2  Family history of diabetes mellitus (V18 0) (Z83 3)   3  Family history of Laryngeal Cancer (V16 2)  Sister    4  Family history of diabetes mellitus (V18 0) (Z83 3)    Social History    · Being A Social Drinker   · Current every day smoker (305 1) (F17 200)   · Exercise: Walking   · Former smoker (V15 82) (R69 536)   · No drug use   · Tobacco use (305 1) (Z72 0)   ·     Current Meds    1  Ventolin  (90 Base) MCG/ACT Inhalation Aerosol Solution; INHALE 2 PUFFS   EVERY 4 HOURS AS NEEDED; Therapy: 17XWZ7380 to (Last OF:79IPB6498)  Requested for: 44NNB9792 Ordered    2  Citalopram Hydrobromide 20 MG Oral Tablet; take one tablet once daily; Therapy: 55ONC1557 to (Isha Found)  Requested for: 02EBD2645; Last   Rx:89Uei4978 Ordered    3  Aspirin 81 MG TABS; TAKE 1 TABLET DAILY; Therapy: 29NAK9578 to Recorded    4  Furosemide 40 MG Oral Tablet; TAKE 1 TABLET DAILY AS DIRECTED  Requested for:   75YUW3507; Last Rx:29Mar2016 Ordered   5  Nitrostat 0 4 MG Sublingual Tablet Sublingual; DISSOLVE 1 TABLET UNDER THE   TONGUE AS NEEDED FOR CHEST PAIN Q 5 MINUTES X3  IF NO RELIEF   CALL 911  Requested for: 12ESG8212; Last Rx:17Mar2016 Ordered    6  Lisinopril 10 MG Oral Tablet; TAKE 1 TABLET DAILY AS DIRECTED  Requested for:   75FMY5283; Last Rx:29Mar2016 Ordered    7  Carvedilol 3 125 MG Oral Tablet; TAKE 1 TABLET TWICE DAILY,  WITH MORNING AND   EVENING MEAL  Requested for: 75KSK1661; Last Rx:29Mar2016 Ordered    8  FreeStyle InsuLinx Test In Vitro Strip; ONE  EVERY DAY; Therapy: 09UXY2279 to (Last Rx:26Mar2014)  Requested for: 26Mar2014 Ordered   9  MetFORMIN HCl - 1000 MG Oral Tablet; TAKE 1 TABLET TWICE DAILY;    Therapy: 26ZKA8977 to ([facility])  Requested for: 30LHG6563; Last   SS:58RFX4117 Ordered    Allergies    1  Penicillins    End of Encounter Meds    1  Ventolin  (90 Base) MCG/ACT Inhalation Aerosol Solution; INHALE 2 PUFFS   EVERY 4 HOURS AS NEEDED; Therapy: 36WGP9668 to (Last SQ:07LBU7267)  Requested for: 35WAP8978 Ordered    2  Citalopram Hydrobromide 20 MG Oral Tablet; take one tablet once daily; Therapy: 44FPV3154 to ([facility])  Requested for: 28YXN8006; Last   Rx:81Nex3171 Ordered    3  Aspirin 81 MG TABS; TAKE 1 TABLET DAILY; Therapy: 37JYN5465 to Recorded    4  Furosemide 40 MG Oral Tablet; TAKE 1 TABLET DAILY AS DIRECTED  Requested for:   00AXL4689; Last Rx:29Mar2016 Ordered   5  Nitrostat 0 4 MG Sublingual Tablet Sublingual; DISSOLVE 1 TABLET UNDER THE   TONGUE AS NEEDED FOR CHEST PAIN Q 5 MINUTES X3  IF NO RELIEF   CALL 911  Requested for: 44PYV8693; Last Rx:17Mar2016 Ordered    6  Lisinopril 10 MG Oral Tablet; TAKE 1 TABLET DAILY AS DIRECTED  Requested for:   18LKU0768; Last Rx:29Mar2016 Ordered    7  Carvedilol 3 125 MG Oral Tablet; TAKE 1 TABLET TWICE DAILY,  WITH MORNING AND   EVENING MEAL  Requested for: 58QKQ7746; Last Rx:29Mar2016 Ordered    8  FreeStyle InsuLinx Test In Vitro Strip; ONE  EVERY DAY; Therapy: 76WRG4596 to (Last Rx:26Mar2014)  Requested for: 26Mar2014 Ordered   9  MetFORMIN HCl - 1000 MG Oral Tablet; TAKE 1 TABLET TWICE DAILY; Therapy: 42PIR5138 to ([facility])  Requested for: 73XTG4081; Last   DX:69LHF7113 Ordered    Future Appointments    Date/Time Provider Specialty Site   06/01/2016 11:20 AM ABHINAV Abad  Cardiology [facility] CARDIOLOGY [place]   08/02/2016 10:00 AM Jonna Small [address]     Patient Care Team    Care Team Member Role Specialty Office Number   Liliane NUNEZ    Cardiology [phone]     Signatures   Electronically signed by : Rubens Mercado RN; May 23 2016  2:46PM EST (Author)    Electronically signed by : Yvrose Sanchez RN; May 23 2016  2:55PM EST                       (Author)

## 2018-01-14 NOTE — PSYCH
Progress Note  Individual Psychotherapy 60 min minutes provided today  Goals addressed in session:   GOALS- Processing feelings/connecting to community supports  Art appears and reports feeling a little less depressed lately  Art shares that he has been regularly talking with a woman who lives n [place] and this is helping him feel less alone  Art shares that he is trying to focus on positive things and not worry as much about things out of his control  Art is waiting ot hear from the [facility] about the referral  IN the meantime, Art has been looking into some other possibilities with [facility] or the [employer]  Assessment  Mood is less depressed a little more hopeful  Plan  Agreed that next session can be in 3 weeks and that this is sufficient  Art is h hopeful to become involved in community activities  Signatures   Electronically signed by :  Sandi Hutchins; Oct  1 5889  1:53PM EST                       (Author)

## 2018-01-15 NOTE — PROGRESS NOTES
History of Present Illness  Care Coordination Encounter Information:   Type of Encounter: Telephonic    Spoke to Other   Brianda,   Care Coordination SL Nurse [facility]:   The reason for call is to discuss outreach for follow up/needed services  Spoke with Brianda from [address] to inform her that pt said Quentin Parmar from case management had cancelled her appt with the pt and would call him back to reschedule and has not as of yet  Brianda apologized and took pts name and number down and states she will Ruth Meres know pt needs appt  Will call patient in a few days to see if Quentin Parmar has been in contact with him      Active Problems    1  Acute sinusitis (461 9) (J01 90)   2  Anxiety (300 00) (F41 9)   3  Chronic obstructive pulmonary disease (496) (J44 9)   4  Depression (311) (F32 9)   5  Dilated cardiomyopathy (425 4) (I42 0)   6  Heart failure, systolic, with acute decompensation (428 23) (I50 23)   7  Hyperlipidemia (272 4) (E78 5)   8  Hypertension (401 9) (I10)   9  Ischemic cardiomyopathy (414 8) (I25 5)   10  Need for influenza vaccination (V04 81) (Z23)   11  Tobacco use (305 1) (Z72 0)   12  Type 2 diabetes mellitus (250 00) (E11 9)    Past Medical History    1  History of Congestive heart failure (428 0) (I50 9)   2  History of congestive cardiomyopathy (V12 59) (Z86 79)   3  History of heartburn (V12 79) (Z87 898)   4  History of myocardial infarction (412) (I25 2)   5  History of shortness of breath (V13 89) (Z87 898)   6  History of wheezing (V12 69) (Z87 898)   7  History of Neck pain (723 1) (M54 2)   8  History of Pneumonia (V12 61)    Surgical History    1  History of Hand Surgery    Family History    1  Family history of arthritis (V17 7) (Z82 61)    2  Family history of diabetes mellitus (V18 0) (Z83 3)   3  Family history of Laryngeal Cancer (V16 2)    4   Family history of diabetes mellitus (V18 0) (Z83 3)    Social History    · Being A Social Drinker   · Current every day smoker (305 1) (F17 200)   · Exercise: Walking   · Former smoker (V15 82) (N68 419)   · No drug use   · Tobacco use (305 1) (Z72 0)   ·     Current Meds    1  Ventolin  (90 Base) MCG/ACT Inhalation Aerosol Solution; INHALE 2 PUFFS   EVERY 4 HOURS AS NEEDED; Therapy: 70XPD9542 to (Last IT:83OHD1864)  Requested for: 58RQG6595 Ordered    2  Citalopram Hydrobromide 20 MG Oral Tablet; take one tablet once daily; Therapy: 33KZV3430 to ([facility])  Requested for: 39KXP7082; Last   Rx:2016 Ordered    3  Aspirin 81 MG Oral Tablet; TAKE 1 TABLET DAILY; Therapy: 66RND9777 to Recorded    4  Carvedilol 12 5 MG Oral Tablet; TAKE 1 TABLET TWICE DAILY,  WITH MORNING AND   EVENING MEAL; Therapy: 08XPD8421 to ([facility])  Requested for: 75ZFF4132; Last   Rx:2014 Ordered    5  FreeStyle InsuLinx Test In Vitro Strip; ONE  EVERY DAY; Therapy: 06RYM4134 to (Last Rx:2014)  Requested for: 2014 Ordered    6  Furosemide 40 MG Oral Tablet; TAKE 1 TABLET DAILY AS DIRECTED; Therapy: (QHTVYOW44MIQ8632) to Recorded   7  Lisinopril 10 MG Oral Tablet; TAKE 1 TABLET DAILY AS DIRECTED; Therapy: (VBQGRUII:51MGP1044) to Recorded   8  Nitrostat 0 4 MG Sublingual Tablet Sublingual;   Therapy: (Recorded:2016) to Recorded    Allergies    1  Penicillins    End of Encounter Meds    1  Ventolin  (90 Base) MCG/ACT Inhalation Aerosol Solution; INHALE 2 PUFFS   EVERY 4 HOURS AS NEEDED; Therapy: 83IWR2331 to (Last WC:82RYY8438)  Requested for: 90JMQ8318 Ordered    2  Citalopram Hydrobromide 20 MG Oral Tablet; take one tablet once daily; Therapy: 93TFJ4173 to ([facility])  Requested for: 35RRT3448; Last   Rx:2016 Ordered    3  Aspirin 81 MG Oral Tablet; TAKE 1 TABLET DAILY; Therapy: 59DLR8527 to Recorded    4  Carvedilol 12 5 MG Oral Tablet; TAKE 1 TABLET TWICE DAILY,  WITH MORNING AND   EVENING MEAL;    Therapy: 94QVR1702 to (Evaluate:36Pvn5251)  Requested for: 22LBF7319; Last   Rx:05Jun2014 Ordered    5  FreeStyle InsuLinx Test In Vitro Strip; ONE  EVERY DAY; Therapy: 52FJO7677 to (Last Rx:26Mar2014)  Requested for: 26Mar2014 Ordered    6  Furosemide 40 MG Oral Tablet; TAKE 1 TABLET DAILY AS DIRECTED; Therapy: (GOJPYMGV:58MYR5555) to Recorded   7  Lisinopril 10 MG Oral Tablet; TAKE 1 TABLET DAILY AS DIRECTED; Therapy: (TWGMSTOA:01PJR5928) to Recorded   8  Nitrostat 0 4 MG Sublingual Tablet Sublingual;   Therapy: (Recorded:04Jan2016) to Recorded    Future Appointments    Date/Time Provider Specialty Site   05/02/2016 09:00 AM Niko Wong, [address] [place] [address]     Patient Care Team    Care Team Member Role Specialty Office Number   Chyrl Hilts M D    Cardiology [phone]   Electronically signed by : Joey Swan RN; Feb 24 2016  8:30AM EST                       (Author)

## 2018-01-15 NOTE — PROGRESS NOTES
History of Present Illness  Care Coordination Encounter Information:   Type of Encounter: Telephonic    Spoke to Patient  Care Coordination  Nurse [address] [place] [address] [address]:   The reason for call is to discuss outreach for follow up/needed services  Reached out to patient who states he was given paperwork by Guevara Carmona, the intern , for [facility]  He has most of the paperwork filled out  He states he finally was able to go grocery shopping for the first time in two months  He is also seeing Saloni Simpson, the , every Tuesday  He states this helps because it gives him someone to talk to  He also states that is why he talks to me, so he has someone to talk to  I encouraged him to feel free to call me if he ever needs to talk or vent about anything  He states he will do so  He also reports that he has been calling the social security office every week and leaving messages because he knows his child support payments will be ending shortly and would like to know when  He states once he is able to stop paying child support, he will have an extra $200 every month and might be able to afford insurance  He is worried because he has been hearing on the news that they may change the social security benefits and food stamp requirements in the near future  I told the patient to take it one day at a time and see what happens  He agrees that he should not think too deeply about this  He states his cardiology appointment with Dr Manolo Vanessa was cancelled because he had to travel over an hour to get to the office, and since he is on a diuretic, he had to have the  stop at a restroom  He stated this caused him to be 15 minutes late and his appointment was cancelled  He is aware that is next appointment is on 6/1/2016 and he states this will be at the [facility] so he is sure he will be able to get there with no problem  I explained to him that it is very important that he see his heart doctor and he agrees   He is also aware of his PCP appointment on 5/2/2016 at [facility]  Patient is doing well otherwise  I tried to encourage him to focus on the positive things in his life  He was able to go grocery shopping, he is able to talk to [facility] weekly and can reach out to me anytime he wants to talk, he received paperwork on [employer], which will hopefully help with his medical bills, and his child support will be ending soon  He agrees that he needs to look at the positive things in his life  I told patient I will follow up with him soon, he is agreeable to that  Active Problems    1  Acute sinusitis (461 9) (J01 90)   2  Anxiety (300 00) (F41 9)   3  Chronic obstructive pulmonary disease (496) (J44 9)   4  Depression (311) (F32 9)   5  Dilated cardiomyopathy (425 4) (I42 0)   6  Heart failure, systolic, with acute decompensation (428 23) (I50 23)   7  Hyperlipidemia (272 4) (E78 5)   8  Hypertension (401 9) (I10)   9  Ischemic cardiomyopathy (414 8) (I25 5)   10  Need for influenza vaccination (V04 81) (Z23)   11  Tobacco use (305 1) (Z72 0)   12  Type 2 diabetes mellitus (250 00) (E11 9)    Past Medical History    1  History of Congestive heart failure (428 0) (I50 9)   2  History of congestive cardiomyopathy (V12 59) (Z86 79)   3  History of heartburn (V12 79) (Z87 898)   4  History of myocardial infarction (412) (I25 2)   5  History of shortness of breath (V13 89) (Z87 898)   6  History of wheezing (V12 69) (Z87 898)   7  History of Neck pain (723 1) (M54 2)   8  History of Pneumonia (V12 61)    Surgical History    1  History of Hand Surgery    Family History    1  Family history of arthritis (V17 7) (Z82 61)    2  Family history of diabetes mellitus (V18 0) (Z83 3)   3  Family history of Laryngeal Cancer (V16 2)    4   Family history of diabetes mellitus (V18 0) (Z83 3)    Social History    · Being A Social Drinker   · Current every day smoker (305 1) (F17 200)   · Exercise: Walking   · Former smoker (V15 82) (V14 820)   · No drug use   · Tobacco use (305 1) (Z72 0)   ·     Current Meds    1  Ventolin  (90 Base) MCG/ACT Inhalation Aerosol Solution; INHALE 2 PUFFS   EVERY 4 HOURS AS NEEDED; Therapy: 44VLS7697 to (Last NB:66EWE7037)  Requested for: 86KZI8247 Ordered    2  Citalopram Hydrobromide 20 MG Oral Tablet; take one tablet once daily; Therapy: 13MMP9330 to ([employer])  Requested for: 83QCH3496; Last   Rx:04Jan2016 Ordered    3  Aspirin 81 MG Oral Tablet; TAKE 1 TABLET DAILY; Therapy: 87NXO4766 to Recorded    4  Furosemide 40 MG Oral Tablet; TAKE 1 TABLET DAILY AS DIRECTED  Requested for:   12LFE3305; Last Rx:29Mar2016 Ordered   5  Nitrostat 0 4 MG Sublingual Tablet Sublingual; DISSOLVE 1 TABLET UNDER THE   TONGUE AS NEEDED FOR CHEST PAIN Q 5 MINUTES X3  IF NO RELIEF   CALL 911  Requested for: 98NDC9515; Last Rx:17Mar2016 Ordered    6  Lisinopril 10 MG Oral Tablet; TAKE 1 TABLET DAILY AS DIRECTED  Requested for:   95TGB7300; Last Rx:29Mar2016 Ordered    7  Carvedilol 3 125 MG Oral Tablet; TAKE 1 TABLET TWICE DAILY,  WITH MORNING AND   EVENING MEAL  Requested for: 38KBA8990; Last Rx:29Mar2016 Ordered    8  FreeStyle InsuLinx Test In Vitro Strip; ONE  EVERY DAY; Therapy: 99ZKL7739 to (Last Rx:26Mar2014)  Requested for: 26Mar2014 Ordered   9  MetFORMIN HCl - 1000 MG Oral Tablet; TAKE 1 TABLET TWICE DAILY; Therapy: 81FNV5596 to (Evaluate:15Jun2016); Last Rx:17Mar2016 Ordered    Allergies    1  Penicillins    End of Encounter Meds    1  Ventolin  (90 Base) MCG/ACT Inhalation Aerosol Solution; INHALE 2 PUFFS   EVERY 4 HOURS AS NEEDED; Therapy: 28JMJ7893 to (Last MC:20AGA3472)  Requested for: 74IOY2439 Ordered    2  Citalopram Hydrobromide 20 MG Oral Tablet; take one tablet once daily; Therapy: 19RVO4174 to ([employer])  Requested for: 02ONM1358; Last   Rx:04Jan2016 Ordered    3  Aspirin 81 MG Oral Tablet; TAKE 1 TABLET DAILY; Therapy: 23IVS5263 to Recorded    4   Furosemide 40 MG Oral Tablet; TAKE 1 TABLET DAILY AS DIRECTED  Requested for:   26TUK0772; Last Rx:29Mar2016 Ordered   5  Nitrostat 0 4 MG Sublingual Tablet Sublingual; DISSOLVE 1 TABLET UNDER THE   TONGUE AS NEEDED FOR CHEST PAIN Q 5 MINUTES X3  IF NO RELIEF   CALL 911  Requested for: 08KWJ7703; Last Rx:17Mar2016 Ordered    6  Lisinopril 10 MG Oral Tablet; TAKE 1 TABLET DAILY AS DIRECTED  Requested for:   78GUL4494; Last Rx:29Mar2016 Ordered    7  Carvedilol 3 125 MG Oral Tablet; TAKE 1 TABLET TWICE DAILY,  WITH MORNING AND   EVENING MEAL  Requested for: 08QBC1094; Last Rx:29Mar2016 Ordered    8  FreeStyle InsuLinx Test In Vitro Strip; ONE  EVERY DAY; Therapy: 55WGO9283 to (Last Rx:26Mar2014)  Requested for: 26Mar2014 Ordered   9  MetFORMIN HCl - 1000 MG Oral Tablet; TAKE 1 TABLET TWICE DAILY; Therapy: 40BBM8676 to (Evaluate:15Jun2016); Last Rx:17Mar2016 Ordered    Future Appointments    Date/Time Provider Specialty Site   06/01/2016 11:20 AM ABHINAV Evans  Card[facility]   05/02/2016 09:00 AM Rocco Porter, [address] [place] [address]     Patient Care Team    Care Team Member Role Specialty Office Number   Katya NUNEZ    Cardiology [phone]     Signatures   Electronically signed by : Gen Lopez RN; Apr 12 2016  3:57PM EST                       (Author)    Electronically signed by : Gen Lopez RN; Apr 19 2016  3:20PM EST                       (Author)
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
## 2018-01-15 NOTE — PROGRESS NOTES
History of Present Illness  Care Coordination Encounter Information:   Type of Encounter: Telephonic    Spoke to Other   Nelsonchris Littlegloria  Care Coordination SL Nurse Annette Mckeon:   The reason for call is to discuss outreach for follow up/needed services  Spoke with Nelsonchris Tracy from [address] [place] [address] about patient being concerned he would not be able to be seen at his cardiology office  She reports patient wanted to be seen to get his medications refilled  She states patient has had quite a few no shows and it has been over a year since he has been seen by Dr Puneet Uriostegui  For this reason, they told patient they would not be able to refill his prescriptions without being seen since it had been over a year  They, however, did not tell him they would not see him  She states patient showed up to his appointment on 3/29/2016 45 minutes late so they had to cancel appointment  Patient was still given refills on his medications and was rescheduled for 6/1/2016  I made Nhung Archuleta aware that patient was upset because he thought he was being sued by Dr Annie Berry office and I had to assure him that was not the case  She also assured me they would never turn down a patient regardless if they had an outstanding bill or not  I thanked her for reaching out to me regarding this matter so that I can reiterate this information to the patient if there is any misunderstanding in the future  Active Problems    1  Acute sinusitis (461 9) (J01 90)   2  Anxiety (300 00) (F41 9)   3  Chronic obstructive pulmonary disease (496) (J44 9)   4  Depression (311) (F32 9)   5  Dilated cardiomyopathy (425 4) (I42 0)   6  Heart failure, systolic, with acute decompensation (428 23) (I50 23)   7  Hyperlipidemia (272 4) (E78 5)   8  Hypertension (401 9) (I10)   9  Ischemic cardiomyopathy (414 8) (I25 5)   10  Need for influenza vaccination (V04 81) (Z23)   11  Tobacco use (305 1) (Z72 0)   12   Type 2 diabetes mellitus (250 00) (E11 9)    Past Medical History    1  History of Congestive heart failure (428 0) (I50 9)   2  History of congestive cardiomyopathy (V12 59) (Z86 79)   3  History of heartburn (V12 79) (Z87 898)   4  History of myocardial infarction (412) (I25 2)   5  History of shortness of breath (V13 89) (Z87 898)   6  History of wheezing (V12 69) (Z87 898)   7  History of Neck pain (723 1) (M54 2)   8  History of Pneumonia (V12 61)    Surgical History    1  History of Hand Surgery    Family History    1  Family history of arthritis (V17 7) (Z82 61)    2  Family history of diabetes mellitus (V18 0) (Z83 3)   3  Family history of Laryngeal Cancer (V16 2)    4  Family history of diabetes mellitus (V18 0) (Z83 3)    Social History    · Being A Social Drinker   · Current every day smoker (305 1) (F17 200)   · Exercise: Walking   · Former smoker (V15 82) (L36 093)   · No drug use   · Tobacco use (305 1) (Z72 0)   ·     Current Meds    1  Ventolin  (90 Base) MCG/ACT Inhalation Aerosol Solution; INHALE 2 PUFFS   EVERY 4 HOURS AS NEEDED; Therapy: 00YTP7825 to (Last YS:24PYX1446)  Requested for: 23XDW1013 Ordered    2  Citalopram Hydrobromide 20 MG Oral Tablet; take one tablet once daily; Therapy: 04NEM0795 to (Chau Lopes)  Requested for: 69UZV4380; Last   Rx:04Jan2016 Ordered    3  Aspirin 81 MG Oral Tablet; TAKE 1 TABLET DAILY; Therapy: 51KKK3529 to Recorded    4  Furosemide 40 MG Oral Tablet; TAKE 1 TABLET DAILY AS DIRECTED  Requested for:   92UBQ3704; Last Rx:29Mar2016 Ordered   5  Nitrostat 0 4 MG Sublingual Tablet Sublingual; DISSOLVE 1 TABLET UNDER THE   TONGUE AS NEEDED FOR CHEST PAIN Q 5 MINUTES X3  IF NO RELIEF   CALL 911  Requested for: 00PVG8205; Last Rx:17Mar2016 Ordered    6  Lisinopril 10 MG Oral Tablet; TAKE 1 TABLET DAILY AS DIRECTED  Requested for:   04KJT7489; Last Rx:29Mar2016 Ordered    7   Carvedilol 3 125 MG Oral Tablet; TAKE 1 TABLET TWICE DAILY,  WITH MORNING AND   EVENING MEAL  Requested for: 25BFL6698; Last Rx:29Mar2016 Ordered    8  FreeStyle InsuLinx Test In Vitro Strip; ONE  EVERY DAY; Therapy: 94UNT1121 to (Last Rx:26Mar2014)  Requested for: 26Mar2014 Ordered   9  MetFORMIN HCl - 1000 MG Oral Tablet; TAKE 1 TABLET TWICE DAILY; Therapy: 97DGE7825 to (Evaluate:15Jun2016); Last Rx:17Mar2016 Ordered    Allergies    1  Penicillins    End of Encounter Meds    1  Ventolin  (90 Base) MCG/ACT Inhalation Aerosol Solution; INHALE 2 PUFFS   EVERY 4 HOURS AS NEEDED; Therapy: 78QMK4745 to (Last NP:95DQC1665)  Requested for: 31UEN2202 Ordered    2  Citalopram Hydrobromide 20 MG Oral Tablet; take one tablet once daily; Therapy: 00FDV3023 to (Beata Andrews)  Requested for: 14EKW4742; Last   Rx:04Jan2016 Ordered    3  Aspirin 81 MG Oral Tablet; TAKE 1 TABLET DAILY; Therapy: 18YSA5985 to Recorded    4  Furosemide 40 MG Oral Tablet; TAKE 1 TABLET DAILY AS DIRECTED  Requested for:   44KSX7169; Last Rx:29Mar2016 Ordered   5  Nitrostat 0 4 MG Sublingual Tablet Sublingual; DISSOLVE 1 TABLET UNDER THE   TONGUE AS NEEDED FOR CHEST PAIN Q 5 MINUTES X3  IF NO RELIEF   CALL 911  Requested for: 67UJA7188; Last Rx:17Mar2016 Ordered    6  Lisinopril 10 MG Oral Tablet; TAKE 1 TABLET DAILY AS DIRECTED  Requested for:   53XBH8580; Last Rx:29Mar2016 Ordered    7  Carvedilol 3 125 MG Oral Tablet; TAKE 1 TABLET TWICE DAILY,  WITH MORNING AND   EVENING MEAL  Requested for: 40EGY7605; Last Rx:29Mar2016 Ordered    8  FreeStyle InsuLinx Test In Vitro Strip; ONE  EVERY DAY; Therapy: 97LOW2718 to (Last Rx:26Mar2014)  Requested for: 26Mar2014 Ordered   9  MetFORMIN HCl - 1000 MG Oral Tablet; TAKE 1 TABLET TWICE DAILY; Therapy: 47VWB2741 to (Evaluate:15Jun2016); Last Rx:17Mar2016 Ordered    Future Appointments    Date/Time Provider Specialty Site   06/01/2016 11:20 AM ABHINAV Funez   Cardiology [facility]   05/02/2016 09:00 AM Nhan Ramos [address] [place] [address] [facility]     Patient Care Team    Care Team Member Role Specialty Office Number   Yadira Chandan NUNEZ    Cardiology [phone]     Signatures   Electronically signed by : Faby Valdes RN; Apr 1 2016  8:34AM EST                       (Author)    Electronically signed by : Faby Valdes RN; Apr 1 2016  8:35AM EST                       (Author)

## 2018-01-16 NOTE — PSYCH
Progress Note  Individual Psychotherapy 60 min minutes provided today  Goals addressed in session:   GOALS- PROCESSING GRIEF AND LOSS/ MAINTAINING SOCIAL INTERACTIONS  Art comes to session sharing that he has been realizing how much his childhood has affected him throughout his life  Art shares that it was helped to remember his [employer] teacher in last session- as he was the first person that helped him feel good about himself aand believe in his intelligence and abilities  Art shares that he has been attending groups at the [employer] that has triggered feelings of depression nd anxiety- on the one hand he doesn;t want to talk about his losses, but on the other hand, he has decided t tapan be helpful  Therapist reviewed coping skills for triggers- grounding and calming techniques  Current suicide risk is low   Assessment  Mood is stable with some depression as he is recognizing his past traumas  Art is feeling much better since he is attending the [employer]      Plan  See in 2 weeks- review grounding and calming techniques and encourage talking about past events as Art feels that enough  for this  Signatures   Electronically signed by :  Sandi Li; Feb 4 5554  9:19AM EST                       (Author)

## 2018-01-16 NOTE — PSYCH
Progress Note  Individual Psychotherapy 60 min minutes provided today  Goals addressed in session:   [address] is tearful and expresses feeling very depressed and hopeless about his situation  Art denies any SI  Art shared more about how much he loved his job and misses it  Art also shared some of grief about loosing his wife and then his daughter to his sister, in addition to his only son rejecting him  Art shared that "nobody" called on his birthday or around the holidays and how hurt he feels about this  While Art states that his main stressors are financially, he also acknowledges that he has gone through a lot of loss and trauma in his life, and that it does help to talk to somebody about these things  Art was able to cry a little in session and process some of his grief  Art talked about his wife's death and questions he has about the cause  Current suicide risk is low   Assessment  Mood is depressed, insight and judgement are intact  Art is motivated to explore all poss resources but has been falling through most of the cracks for financial assistance  Plan  See in 1 week- therapist asked Art to write down questions he has about his wife's death  Signatures   Electronically signed by :  Sandi Estrada; Mar 31 1169 10:27PM EST                       (Author)

## 2018-01-16 NOTE — PSYCH
Progress Note  Individual Psychotherapy 60 min minutes provided today  Goals addressed in session:   GOALS- Self care, exploring options  Art sharers with therapist that his [employer] [facility]  has been causing him alot of stress, and instead of helping, he is feeling ore depressed and anxious  Art relays that after he left the last time, Toñito cried for 2 days  This is why he forgot his therapy appt last time  Art relays that he  is making suggesting that are not helpful and are making him feel more desperate  Therapist validated Toñito's feelings and affirmed to him that he has the right to request a diff - Toñito was relieved to hear this and plans to follow through with this  Discussed fiances and housing issues- Art agreed that is he could find an appropriate room mate, that he would rather stay in the house he is in  Discussed possible options to find a room mate  Art expressed feeling better by the end of the session  Current suicide risk is low   Assessment  Mood is depressed and anxious, but improved by end of session  Plan  See in 2 - weeks- for ongoing support  Signatures   Electronically signed by :  Sandi Lemons; Jul 22 0331  6:15PM EST                       (Author)

## 2018-01-16 NOTE — PSYCH
Progress Note  Individual Psychotherapy 60min minutes provided today  Goals addressed in session:   [address] shared more info about the loss of his wife and his job, which he loved  Art did write down some questions about how his wife dies, which he has accepted he will never have answered  Art shares that it was helpful to some degree to write out his questions  Art shared more about the events which took place leading up to his first heart attack and subsequently  Art expresses that he has tried to do the right thing throughout his life, but has felt that things keep "going wrong" anyway- he is feeling hopeless about his future, but denies any SI  Art cries in session as he shares how much he misses his daughter and that he does not have any power to be insist that he see her  Art shares that it has been helpful to talk to someone about the things he has been through  Due to worries over his bill, Art has asked to reduce sessions to biweekly  Current suicide risk is low   Assessment   Mood is depressed and anxious due to difficult situation that he is in and multiple losses  Plan  See in 2 weeks- therapist will continue to look into financial supports as appropriate  Signatures   Electronically signed by : Sandi Vogel;  Apr 6 2016 11:12PM EST                       (Author)
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
## 2018-01-17 NOTE — PSYCH
Progress Note  Individual Psychotherapy 60min minutes provided today  Goals addressed in session:   [address] appears and reports feeing a little less depressed  He has been spending time on her Internet researching and communicating w others which helps him feel less isolated  Art shared more details about how he learned about his wife's death and how his health led to losing his job  Art shares that it his helpful to talk to someone ,although it s also upsetting  Art is trying to get outside with the nice weather, which helps him feel better  Current suicide risk is low   Assessment  Mood a little less depressed  Plan   See in 2 weeks- support increased insight and processing of losses and grief  Signatures   Electronically signed by : Kaya Curry [place];  Apr 20 2016 10:01PM EST                       (Author)

## 2018-01-18 NOTE — PSYCH
Progress Note  Individual Psychotherapy 60 min minutes provided today  Goals addressed in session:   GOALS- Connecting with positive social outlets  Art appears and reports feeling a little less depressed- he has been communicating regularly with a woman in [place] and they have developed a friendship- Art shares how it is hard to to find people who "get" him, and she does, which makes him feel les alone  Art shares that he and his  have been discussing the [employer]- he is interested in this  Therapist began the referral form in session and will complete it for him  Art continues to be under a lot of financial strain, but he is looking into some help which com thought to pay for his Part B Medicare  Art  is also expresses some Hope that he can find a meaningful activity to engage in to reduce his isolation  Current suicide risk is low   Assessment  Mood is stable with some depression  Insight and judgement are very good  Art is taking steps towards a socialization goal      Plan  See in 2 weeks for support and assess status of referral at that time  Signatures   Electronically signed by :  Sandi Kilgore; Sep 16 9997  6:44PM EST                       (Author)

## 2018-01-18 NOTE — PSYCH
Progress Note  Individual Psychotherapy 60 min minutes provided today  Goals addressed in session:   GOALS- Making social connections  Art appears and reports feeling generally better/ not as depressed  Art is talking with a friend long distance and finding ways to get out of the house more, which is helping him feel less isolated and depressed  Art is trying to let go of things that are out of his control, and Focus on what he can control  Art has been attending a local [employer] which is also helping him feel better  Art relays that his casemgr told him that the funding fo the [employer] went through and he is just waiting to hear from them  Art has also not heard form [facility] about his application  IN Session, therapist called [facility] and learned that Art is still not in the system- therapist will look into this further  Current suicide risk is low   Assessment  Mood has improved and Art is making progress on his goals  Plan  See in 1 month- due to concern about billing- Art also feels this will be OK due to feeling generally better  Signatures   Electronically signed by :  Danica Gonzáles [facility]; Oct 21 2227 10:39PM EST                       (Author)

## 2018-01-18 NOTE — PROGRESS NOTES
History of Present Illness  Care Coordination Encounter Information:   Type of Encounter: Telephonic    Spoke to Patient  Care Coordination SL Nurse [facility]:   The reason for call is to discuss outreach for follow up/needed services  Spoke with patient to update him on my conversation with [facility] [place] regarding [facility] and that he is not being sued  Patient instantly feels much better and states he wonders why the cardiology office was making him feel as if he was not going to get their full services  I assured him that he should be getting their full services  Patient states he feels better and I told him to get some rest and [facility] will follow up with him next week during his appointment with Renee Magdaleno  Patient thanked me  Active Problems    1  Acute sinusitis (461 9) (J01 90)   2  Anxiety (300 00) (F41 9)   3  Chronic obstructive pulmonary disease (496) (J44 9)   4  Depression (311) (F32 9)   5  Dilated cardiomyopathy (425 4) (I42 0)   6  Heart failure, systolic, with acute decompensation (428 23) (I50 23)   7  Hyperlipidemia (272 4) (E78 5)   8  Hypertension (401 9) (I10)   9  Ischemic cardiomyopathy (414 8) (I25 5)   10  Need for influenza vaccination (V04 81) (Z23)   11  Tobacco use (305 1) (Z72 0)   12  Type 2 diabetes mellitus (250 00) (E11 9)    Past Medical History    1  History of Congestive heart failure (428 0) (I50 9)   2  History of congestive cardiomyopathy (V12 59) (Z86 79)   3  History of heartburn (V12 79) (Z87 898)   4  History of myocardial infarction (412) (I25 2)   5  History of shortness of breath (V13 89) (Z87 898)   6  History of wheezing (V12 69) (Z87 898)   7  History of Neck pain (723 1) (M54 2)   8  History of Pneumonia (V12 61)    Surgical History    1  History of Hand Surgery    Family History    1  Family history of arthritis (V17 7) (Z82 61)    2  Family history of diabetes mellitus (V18 0) (Z83 3)   3  Family history of Laryngeal Cancer (V16 2)    4   Family history of diabetes mellitus (V18 0) (Z83 3)    Social History    · Being A Social Drinker   · Current every day smoker (305 1) (F17 200)   · Exercise: Walking   · Former smoker (V15 82) (A26 372)   · No drug use   · Tobacco use (305 1) (Z72 0)   ·     Current Meds    1  Ventolin  (90 Base) MCG/ACT Inhalation Aerosol Solution; INHALE 2 PUFFS   EVERY 4 HOURS AS NEEDED; Therapy: 20WWK5306 to (Last SA:73SES7209)  Requested for: 41FPS4334 Ordered    2  Citalopram Hydrobromide 20 MG Oral Tablet; take one tablet once daily; Therapy: 89UEP8327 to (Kem Keyes)  Requested for: 94UTR8685; Last   Rx:04Jan2016 Ordered    3  Aspirin 81 MG Oral Tablet; TAKE 1 TABLET DAILY; Therapy: 45KQG4608 to Recorded    4  Furosemide 40 MG Oral Tablet; TAKE 1 TABLET DAILY AS DIRECTED  Requested for:   87GYQ9335; Last Rx:17Mar2016 Ordered   5  Nitrostat 0 4 MG Sublingual Tablet Sublingual; DISSOLVE 1 TABLET UNDER THE   TONGUE AS NEEDED FOR CHEST PAIN Q 5 MINUTES X3  IF NO RELIEF   CALL 911  Requested for: 01XGE8354; Last Rx:17Mar2016 Ordered    6  Lisinopril 10 MG Oral Tablet; TAKE 1 TABLET DAILY AS DIRECTED  Requested for:   07OPB9542; Last Rx:17Mar2016 Ordered    7  Carvedilol 3 125 MG Oral Tablet; TAKE 1 TABLET TWICE DAILY,  WITH MORNING AND   EVENING MEAL  Requested for: 41FGD8434; Last Rx:17Mar2016 Ordered    8  FreeStyle InsuLinx Test In Vitro Strip; ONE  EVERY DAY; Therapy: 06NHD9530 to (Last Rx:26Mar2014)  Requested for: 26Mar2014 Ordered   9  MetFORMIN HCl - 1000 MG Oral Tablet; TAKE 1 TABLET TWICE DAILY; Therapy: 50XVU3733 to (Evaluate:15Jun2016); Last Rx:17Mar2016 Ordered    Allergies    1  Penicillins    End of Encounter Meds    1  Ventolin  (90 Base) MCG/ACT Inhalation Aerosol Solution; INHALE 2 PUFFS   EVERY 4 HOURS AS NEEDED; Therapy: 57XBB7842 to (Last NV:87EJX6188)  Requested for: 02GRV9992 Ordered    2  Citalopram Hydrobromide 20 MG Oral Tablet; take one tablet once daily;    Therapy: 06XJH9847 to (Liz Root)  Requested for: 58QCW9756; Last   Rx:04Jan2016 Ordered    3  Aspirin 81 MG Oral Tablet; TAKE 1 TABLET DAILY; Therapy: 98KGT0359 to Recorded    4  Furosemide 40 MG Oral Tablet; TAKE 1 TABLET DAILY AS DIRECTED  Requested for:   20OTS8155; Last Rx:17Mar2016 Ordered   5  Nitrostat 0 4 MG Sublingual Tablet Sublingual; DISSOLVE 1 TABLET UNDER THE   TONGUE AS NEEDED FOR CHEST PAIN Q 5 MINUTES X3  IF NO RELIEF   CALL 911  Requested for: 01KTA5763; Last Rx:17Mar2016 Ordered    6  Lisinopril 10 MG Oral Tablet; TAKE 1 TABLET DAILY AS DIRECTED  Requested for:   04DWI7574; Last Rx:17Mar2016 Ordered    7  Carvedilol 3 125 MG Oral Tablet; TAKE 1 TABLET TWICE DAILY,  WITH MORNING AND   EVENING MEAL  Requested for: 35JTD5140; Last Rx:17Mar2016 Ordered    8  FreeStyle InsuLinx Test In Vitro Strip; ONE  EVERY DAY; Therapy: 68LKU0456 to (Last Rx:26Mar2014)  Requested for: 26Mar2014 Ordered   9  MetFORMIN HCl - 1000 MG Oral Tablet; TAKE 1 TABLET TWICE DAILY; Therapy: 78KFE2177 to (Evaluate:15Jun2016); Last Rx:17Mar2016 Ordered    Future Appointments    Date/Time Provider Specialty Site   03/29/2016 08:00 AM ABHINAV Cotto  Cardiology [facility] CARDIOLOGY [facility]   05/02/2016 09:00 AM Shira Merchant, [address] [address]     Patient Care Team    Care Team Member Role Specialty Office Number   Kushal NUNEZ    Cardiology [phone]   Electronically signed by : Sada Joya RN; Mar 24 2016 12:02PM EST                       (Author)
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
## 2018-01-22 VITALS
WEIGHT: 184.13 LBS | TEMPERATURE: 99 F | SYSTOLIC BLOOD PRESSURE: 164 MMHG | BODY MASS INDEX: 27.91 KG/M2 | HEIGHT: 68 IN | DIASTOLIC BLOOD PRESSURE: 86 MMHG | OXYGEN SATURATION: 97 % | RESPIRATION RATE: 18 BRPM | HEART RATE: 76 BPM

## 2018-01-22 VITALS
TEMPERATURE: 97.4 F | DIASTOLIC BLOOD PRESSURE: 100 MMHG | SYSTOLIC BLOOD PRESSURE: 172 MMHG | RESPIRATION RATE: 18 BRPM | BODY MASS INDEX: 29.14 KG/M2 | HEIGHT: 68 IN | WEIGHT: 192.25 LBS | OXYGEN SATURATION: 98 % | HEART RATE: 85 BPM

## 2018-02-28 DIAGNOSIS — E78.2 MIXED HYPERLIPIDEMIA: ICD-10-CM

## 2018-02-28 DIAGNOSIS — E11.9 DIABETES MELLITUS TYPE 2 IN NONOBESE (HCC): Primary | ICD-10-CM

## 2018-02-28 RX ORDER — DOXYCYCLINE HYCLATE 100 MG/1
1 CAPSULE ORAL EVERY 12 HOURS
COMMUNITY
Start: 2017-10-11 | End: 2018-06-01 | Stop reason: ALTCHOICE

## 2018-02-28 RX ORDER — CITALOPRAM 40 MG/1
TABLET ORAL
Refills: 3 | COMMUNITY
Start: 2018-02-17 | End: 2018-03-03 | Stop reason: SDUPTHER

## 2018-02-28 RX ORDER — ALBUTEROL SULFATE 90 UG/1
2 AEROSOL, METERED RESPIRATORY (INHALATION) EVERY 4 HOURS PRN
Status: ON HOLD | COMMUNITY
Start: 2016-01-04 | End: 2019-05-22 | Stop reason: SDUPTHER

## 2018-02-28 RX ORDER — HYDROXYZINE HYDROCHLORIDE 25 MG/1
25 TABLET, FILM COATED ORAL 3 TIMES DAILY
Refills: 3 | COMMUNITY
Start: 2018-02-17 | End: 2018-06-25 | Stop reason: ALTCHOICE

## 2018-02-28 RX ORDER — ATORVASTATIN CALCIUM 20 MG/1
TABLET, FILM COATED ORAL
Refills: 3 | COMMUNITY
Start: 2018-02-17 | End: 2018-02-28 | Stop reason: SDUPTHER

## 2018-02-28 RX ORDER — LISINOPRIL 10 MG/1
10 TABLET ORAL 2 TIMES DAILY
Refills: 3 | COMMUNITY
Start: 2018-01-18 | End: 2019-02-04 | Stop reason: SDUPTHER

## 2018-02-28 RX ORDER — TRAMADOL HYDROCHLORIDE 50 MG/1
1 TABLET ORAL 2 TIMES DAILY PRN
COMMUNITY
Start: 2016-08-10 | End: 2018-06-01 | Stop reason: SDUPTHER

## 2018-02-28 RX ORDER — CARVEDILOL 3.12 MG/1
TABLET ORAL
Refills: 2 | COMMUNITY
Start: 2018-01-18 | End: 2018-06-01 | Stop reason: SDUPTHER

## 2018-02-28 RX ORDER — FUROSEMIDE 40 MG/1
40 TABLET ORAL 2 TIMES DAILY
Refills: 3 | COMMUNITY
Start: 2018-01-18 | End: 2018-12-10 | Stop reason: SDUPTHER

## 2018-02-28 RX ORDER — ATORVASTATIN CALCIUM 20 MG/1
20 TABLET, FILM COATED ORAL DAILY
Qty: 90 TABLET | Refills: 3 | Status: SHIPPED | OUTPATIENT
Start: 2018-02-28 | End: 2018-03-03 | Stop reason: SDUPTHER

## 2018-02-28 RX ORDER — IPRATROPIUM BROMIDE AND ALBUTEROL SULFATE 2.5; .5 MG/3ML; MG/3ML
SOLUTION RESPIRATORY (INHALATION) 2 TIMES DAILY
Status: ON HOLD | COMMUNITY
Start: 2017-10-11 | End: 2019-05-22 | Stop reason: SDUPTHER

## 2018-02-28 RX ORDER — NITROGLYCERIN 0.4 MG/1
TABLET SUBLINGUAL
Status: ON HOLD | COMMUNITY
End: 2019-05-22 | Stop reason: SDUPTHER

## 2018-03-03 DIAGNOSIS — F32.89 OTHER DEPRESSION: Primary | ICD-10-CM

## 2018-03-03 DIAGNOSIS — E78.2 MIXED HYPERLIPIDEMIA: ICD-10-CM

## 2018-03-04 RX ORDER — ATORVASTATIN CALCIUM 20 MG/1
TABLET, FILM COATED ORAL
Qty: 30 TABLET | Refills: 3 | Status: SHIPPED | OUTPATIENT
Start: 2018-03-04 | End: 2018-06-01 | Stop reason: SDUPTHER

## 2018-03-04 RX ORDER — CITALOPRAM 40 MG/1
TABLET ORAL
Qty: 30 TABLET | Refills: 3 | Status: SHIPPED | OUTPATIENT
Start: 2018-03-04 | End: 2018-06-01 | Stop reason: SDUPTHER

## 2018-03-07 NOTE — PSYCH
Treatment Plan    Date of Initial Treatment Plan: 3/30/2016  Date of Current Treatment Plan: 2/2/2017  Treatment Plan 4  Strengths/Personal Resources for Self Care: INTELLIGENT, RESPONSIBLE, CARING, LOVE OF MUSIC AND HORTICULTURE,  LOVE FOR FAMILY, HARD WORKER, RESILIENT, PERSEVERANT  MAKING PROGRESS  Diagnosis:   Axis I: DEPRESSIVE DISORDER, NOS, ANXIETY DISORDER, NOS   Axis II: NONE   Axis III: MULTIPLE HEALTH PROBLEMS     Area of Needs: [place] FAMILY RELATIONSHIPS,  LACK OF SOCIAL SUPPORT, DEPRESSION, ANXIETY  Long Term Goals:   "TO HAVE ENOUGH MONEY TO TAKE CARE OF MYSELF AND PAY FOR MY MEDICATION"  TO OPTIMIZE HEALTH AND WELL BEING   Target Date: 2/2/2018      TO FIND A MEANINGFUL ACTIVITY TO ENGAGE IN ON A REGULAR BASIS   Target Date: 2/2/2018      RESOLVE PAST LOSSES AND FIND WAYS TO COPE EFFECTIVELY WITH DEPRESSION AND ANXIETY   Target Date: 2/2/2018    Short Term Objectives:   Goal 1:   CONTINUE TO EXPLORE ALL OPTIONS FOR FINANCIAL ASSISTANCE  CONTINUE TO ATTEND REGULAR MEDICAL APPTS AND WORK WITH PHYSICIANS TO FIND THE MOST EFFECTIVE MED COMBINATIONS TO ADDRESS PHYSICAL AND MENTAL HEALTH  FOLLOW THROUGH WITH PAIN MGMNT DR    FOLLOW THROUGH WITH TRANSPORTATION ASSISTANCE  Target Date: 6/2/2017      Goal 2:   CONTINUE TO ENGAGE IN SOCIAL ACTIVITIES AT LEAST 2/X WEEK  CONTINUE TO ATTEND THE POWER PROGRAM    Target Date: 6/2/2017      Goal 3:   LEARN TO COPE WITH TRIGGERS RELATED TO PAST LOSSES AND TRAUMA  TALK ABOUT IF NEEDED  IDENTIFY EFFECTIVE GROUNDING SKILLS  Target Date: 6/2/2017      GOAL 1: Modality: Individual 1-2 x per month Target Date: 6/2/2017      GOAL 1: Modality: Medication Management 1 x per month Target Date: 6/2/2017     GOAL 2: Modality: Individual 1-2 x per month Target Date: 6/2/2017         GOAL 3: Modality: Individual 1-2 x per month Target Date: 6/2/2017             The first scheduled review date is 6/2/2017      The expected length of service is 12-18 MONTHS  The highest level of functioning in the past year was  The current level of functioning is  Patient Signature: _________________________________ Date/Time: ______________      Active Problems    1  Abnormal TSH (790 6) (R79 89)   2  Acute sinusitis (461 9) (J01 90)   3  Allergic urticaria (708 0) (L50 0)   4  Anxiety (300 00) (F41 9)   5  Back pain, chronic (724 5,338 29) (M54 9,G89 29)   6  Chronic obstructive pulmonary disease (496) (J44 9)   7  Depression (311) (F32 9)   8  Dilated cardiomyopathy (425 4) (I42 0)   9  Encounter for screening for malignant neoplasm of colon (V76 51) (Z12 11)   10  Heart failure, systolic, with acute decompensation (428 23) (I50 23)   11  Hyperlipidemia (272 4) (E78 5)   12  Hypertension (401 9) (I10)   13  Ischemic cardiomyopathy (414 8) (I25 5)   14  Need for influenza vaccination (V04 81) (Z23)   15  Tobacco use (305 1) (Z72 0)   16  Type 2 diabetes mellitus (250 00) (E11 9)    Signatures   Electronically signed by :  Sandi Vogel; Feb 2 6046  9:27AM EST                       (Author)
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
## 2018-03-07 NOTE — PSYCH
Treatment Plan    Date of Initial Treatment Plan: 3/30/2016  Date of Current Treatment Plan: 7/20/16  Treatment Plan 2  Strengths/Personal Resources for Self Care: INTELLIGENT, RESPONSIBLE, CARING, LOVE OF MUSIC AND HORTICULTURE,  LOVE FOR FAMILY, HARD WORKER, RESILIENT, PERSEVERANT  Diagnosis:   Axis I: DEPRESSIVE DISORDER, NOS, ANXIETY DISORDER, NOS   Axis II: NONE   Axis III: MULTIPLE HEALTH PROBLEMS     Area of Needs: [place] FAMILY RELATIONSHIPS,  LACK OF SOCIAL SUPPORT, DEPRESSION, ANXIETY  Long Term Goals:   "TO HAVE ENOUGH MONEY TO TAKE CARE OF MYSELF AND PAY FOR MY MEDICATION"  TO OPTIMIZE HEALTH AND WELL BEING   Target Date: 3/30/2017      TO FIND A MEANINGFUL ACTIVITY TO ENGAGE IN ON A REGULAR BASIS   Target Date: 3/30/2016      RESOLVE PAST LOSSES AND FIND WAYS TO COPE EFFECTIVELY WITH DEPRESSION AND ANXIETY   Target Date: 3/30/2016    Short Term Objectives:   Goal 1:   CONTINUE TO EXPLORE ALL OPTIONS FOR FINANCIAL ASSISTANCE  CONTINUE TO ATTEND REGULAR MEDICAL APPTS AND WORK WITH PHYSICIANS TO FIND THE MOST EFFECTIVE MED COMBINATIONS TO ADDRESS PHYSICAL AND MENTAL HEALTH  FOLLOW THROUGH WITH TRANSPORTATION ASSISTANCE  Target Date: 11/20/2016      Goal 2:   IDENTIFY AT LEAST 3 COMMUNITY ACTIVITIES TO EXPLORE  Target Date: 11/20/2016      Goal 3:   DISCUSS PAST LOSSES AND TRAUMAS AS APPROPRIATE AND AS NEEDED  Target Date: 11/20/2016      GOAL 1: Modality: Individual 1-2 x per month Target Date: 11/20/2016      GOAL 1: Modality: Medication Management 1 x per month Target Date: 11/20/2016     GOAL 2: Modality: Individual 1-2 x per month Target Date: 11/20/2016         GOAL 3: Modality: Individual 1-2 x per month Target Date: 11/11/2016             The first scheduled review date is 11/20/2016  The expected length of service is 12-18 MONTHS  The highest level of functioning in the past year was  The current level of functioning is  Patient Signature: _________________________________ Date/Time: ______________      Signatures   Electronically signed by :  Danica Gonz[facility]les [facility]; Jul 20 8872 11:32AM EST                       (Author)

## 2018-03-07 NOTE — PSYCH
Treatment Plan    Date of Initial Treatment Plan: 3/30/2016  Date of Current Treatment Plan: 10/19/2016  Treatment Plan 3  Strengths/Personal Resources for Self Care: INTELLIGENT, RESPONSIBLE, CARING, LOVE OF MUSIC AND HORTICULTURE,  LOVE FOR FAMILY, HARD WORKER, RESILIENT, PERSEVERANT  MAKING PROGRESS  Diagnosis:   Axis I: DEPRESSIVE DISORDER, NOS, ANXIETY DISORDER, NOS   Axis II: NONE   Axis III: MULTIPLE HEALTH PROBLEMS     Area of Needs: [place] FAMILY RELATIONSHIPS,  LACK OF SOCIAL SUPPORT, DEPRESSION, ANXIETY  Long Term Goals:   "TO HAVE ENOUGH MONEY TO TAKE CARE OF MYSELF AND PAY FOR MY MEDICATION"  TO OPTIMIZE HEALTH AND WELL BEING   Target Date: 3/30/2017      TO FIND A MEANINGFUL ACTIVITY TO ENGAGE IN ON A REGULAR BASIS   Target Date: 3/30/2016      RESOLVE PAST LOSSES AND FIND WAYS TO COPE EFFECTIVELY WITH DEPRESSION AND ANXIETY   Target Date: 3/30/2016    Short Term Objectives:   Goal 1:   CONTINUE TO EXPLORE ALL OPTIONS FOR FINANCIAL ASSISTANCE  CONTINUE TO ATTEND REGULAR MEDICAL APPTS AND WORK WITH PHYSICIANS TO FIND THE MOST EFFECTIVE MED COMBINATIONS TO ADDRESS PHYSICAL AND MENTAL HEALTH  FOLLOW THROUGH WITH PAIN MGMNT DR    FOLLOW THROUGH WITH TRANSPORTATION ASSISTANCE  Target Date: 2/20/2017      Goal 2:   CONTINUE TO ENGAGE IN [employer] ACTIVITIES AT LEAST 2/X WEEK  FOLLOW THROUGH WITH LOOKING INTO THE POWER PROGRAM    Target Date: 2/2/0/2017      Goal 3:   CONTINUE TO AVOID TRIGGERS RELATED TO PAST LOSSES AND TRAUMA  TALK ABOUT IF NEEDED  Target Date: 2/20/2017      GOAL 1: Modality: Individual 1-2 x per month Target Date: 2/20/2017      GOAL 1: Modality: Medication Management 1 x per month Target Date: 2/20/2017     GOAL 2: Modality: Individual 1-2 x per month Target Date: 2/20/2017         GOAL 3: Modality: Individual 1-2 x per month Target Date: 2/20/2017             The first scheduled review date is 2/20/2017      The expected length of service is 12-18 MONTHS  The highest level of functioning in the past year was  The current level of functioning is  Patient Signature: _________________________________ Date/Time: ______________      Active Problems    1  Acute sinusitis (461 9) (J01 90)   2  Allergic urticaria (708 0) (L50 0)   3  Anxiety (300 00) (F41 9)   4  Back pain, chronic (724 5,338 29) (M54 9,G89 29)   5  Chronic obstructive pulmonary disease (496) (J44 9)   6  Depression (311) (F32 9)   7  Dilated cardiomyopathy (425 4) (I42 0)   8  Encounter for screening for malignant neoplasm of colon (V76 51) (Z12 11)   9  Heart failure, systolic, with acute decompensation (428 23) (I50 23)   10  Hyperlipidemia (272 4) (E78 5)   11  Hypertension (401 9) (I10)   12  Ischemic cardiomyopathy (414 8) (I25 5)   13  Need for influenza vaccination (V04 81) (Z23)   14  Tobacco use (305 1) (Z72 0)   15  Type 2 diabetes mellitus (250 00) (E11 9)    Signatures   Electronically signed by :  Maris Youngblood; Oct 19 6130 11:54AM EST                       (Author)

## 2018-03-07 NOTE — PSYCH
Treatment Plan    Date of Initial Treatment Plan: 3/30/2016  Date of Current Treatment Plan:   Treatment Plan 1  Strengths/Personal Resources for Self Care: INTELLIGENT, RESPONSIBLE, CARING, LOVE OF MUSIC AND HORTICULTURE,  LOVE FOR FAMILY, HARD WORKER, RESILIENT, PERSEVERANT  Diagnosis:   Axis I: DEPRESSIVE DISORDER, NOS, ANXIETY DISORDER, NOS   Axis II: NONE   Axis III: MULTIPLE HEALTH PROBLEMS     Current Challenges/Problems/Needs: SIGNIFICANT HEALTH CONCERNS, LACK OF FUNDS TO PAY FOR MEDICATIONS,  LITTLE TO NO SOCIAL SUPPORT, MULTIPLE LOSSES/TRAUMAS,  DEPRESSION AND ANXIETY  Long Term Goals:   "TO HAVE ENOUGH MONEY TO TAKE CARE OF MYSELF AND PAY FOR MY MEDICATION"  TO OPTIMIZE HEALTH AND WELL BEING   Target Date: 3/30/2017      TO FIND A MEANINGFUL ACTIVITY TO ENGAGE IN ON A REGULAR BASIS   Target Date: 3/30/2016      RESOLVE PAST LOSSES AND FIND WAYS TO COPE EFFECTIVELY WITH DEPRESSION AND ANXIETY   Target Date: 3/30/2016      Short Term Objectives:   Goal 1:   CONTINUE TO EXPLORE ALL OPTIONS FOR FINANCIAL ASSISTANCE  CONTINUE TO ATTEND REGULAR MEDICAL APPTS AND WORK WITH PHYSICIANS TO FIND THE MOST EFFECTIVE MED COMBINATIONS TO ADDRESS PHYSICAL AND MENTAL HEALTH  Target Date: 7/30/2016      Goal 2:   DISCUSS WITH THERAPIST POSSIBLE OPPORTUNITIES TO ENGAGE IN AN ACTIVITY WHICH IS ENJOYABLE AND AROUND POSITIVE PEOPLE  Target Date: 7/30/2016      Goal 3:   PROCESS FEELINGS RELATED TO PAST LOSSES AND TRAUMAS WITH THERAPIST AND IDENTIFY AT LEAST 2 EFFECTIVE COPING SKILLS FOR ANXIETY AND DEPRESSION  Target Date: 7/30/2016      GOAL 1: Modality: Individual 4 x per month Target Date: 7/30/2016      GOAL 1: Modality: Medication Management 1 x per month Target Date: 7/30/2016     GOAL 2: Modality: Individual 4 x per month Target Date: 7/30/2016         GOAL 3: Modality: Individual 4 x per month Target Date: 7/30/2016         The first scheduled review date is 7/30/2016     The expected length of service is 12-18 MONTHS  Level of functioning at initial assessment: 48      The highest level of functioning in the past year was 64  The current level of functioning is 53  Patient Signature: _________________________________ Date/Time: ______________      Active Problems    1  Acute sinusitis (461 9) (J01 90)   2  Anxiety (300 00) (F41 9)   3  Chronic obstructive pulmonary disease (496) (J44 9)   4  Depression (311) (F32 9)   5  Dilated cardiomyopathy (425 4) (I42 0)   6  Heart failure, systolic, with acute decompensation (428 23) (I50 23)   7  Hyperlipidemia (272 4) (E78 5)   8  Hypertension (401 9) (I10)   9  Ischemic cardiomyopathy (414 8) (I25 5)   10  Need for influenza vaccination (V04 81) (Z23)   11  Tobacco use (305 1) (Z72 0)   12  Type 2 diabetes mellitus (250 00) (E11 9)    Signatures   Electronically signed by :  Sandi Mendez; Mar 30 9168  5:41PM EST                       (Author)

## 2018-06-01 ENCOUNTER — OFFICE VISIT (OUTPATIENT)
Dept: INTERNAL MEDICINE CLINIC | Facility: CLINIC | Age: 63
End: 2018-06-01
Payer: COMMERCIAL

## 2018-06-01 VITALS
HEIGHT: 68 IN | HEART RATE: 104 BPM | DIASTOLIC BLOOD PRESSURE: 88 MMHG | TEMPERATURE: 98.8 F | OXYGEN SATURATION: 98 % | RESPIRATION RATE: 18 BRPM | WEIGHT: 168 LBS | SYSTOLIC BLOOD PRESSURE: 138 MMHG | BODY MASS INDEX: 25.46 KG/M2

## 2018-06-01 DIAGNOSIS — J20.9 ACUTE BRONCHITIS, UNSPECIFIED ORGANISM: ICD-10-CM

## 2018-06-01 DIAGNOSIS — F32.89 OTHER DEPRESSION: ICD-10-CM

## 2018-06-01 DIAGNOSIS — I10 ESSENTIAL HYPERTENSION: Primary | ICD-10-CM

## 2018-06-01 DIAGNOSIS — J44.1 COPD EXACERBATION (HCC): ICD-10-CM

## 2018-06-01 DIAGNOSIS — G89.29 OTHER CHRONIC PAIN: ICD-10-CM

## 2018-06-01 DIAGNOSIS — E78.2 MIXED HYPERLIPIDEMIA: ICD-10-CM

## 2018-06-01 PROCEDURE — A7003 NEBULIZER ADMINISTRATION SET: HCPCS | Performed by: INTERNAL MEDICINE

## 2018-06-01 PROCEDURE — 99213 OFFICE O/P EST LOW 20 MIN: CPT | Performed by: INTERNAL MEDICINE

## 2018-06-01 PROCEDURE — 94640 AIRWAY INHALATION TREATMENT: CPT | Performed by: INTERNAL MEDICINE

## 2018-06-01 PROCEDURE — 3075F SYST BP GE 130 - 139MM HG: CPT | Performed by: INTERNAL MEDICINE

## 2018-06-01 PROCEDURE — 96372 THER/PROPH/DIAG INJ SC/IM: CPT | Performed by: INTERNAL MEDICINE

## 2018-06-01 PROCEDURE — 3079F DIAST BP 80-89 MM HG: CPT | Performed by: INTERNAL MEDICINE

## 2018-06-01 RX ORDER — ALBUTEROL SULFATE 2.5 MG/3ML
2.5 SOLUTION RESPIRATORY (INHALATION) ONCE
Status: COMPLETED | OUTPATIENT
Start: 2018-06-01 | End: 2018-06-01

## 2018-06-01 RX ORDER — CITALOPRAM 40 MG/1
40 TABLET ORAL DAILY
Qty: 30 TABLET | Refills: 5 | Status: SHIPPED | OUTPATIENT
Start: 2018-06-01 | End: 2018-12-10 | Stop reason: SDUPTHER

## 2018-06-01 RX ORDER — GUAIFENESIN AND CODEINE PHOSPHATE 100; 10 MG/5ML; MG/5ML
5 SOLUTION ORAL 3 TIMES DAILY PRN
Qty: 120 ML | Refills: 0 | Status: SHIPPED | OUTPATIENT
Start: 2018-06-01 | End: 2018-06-25 | Stop reason: ALTCHOICE

## 2018-06-01 RX ORDER — PREDNISONE 10 MG/1
TABLET ORAL
Qty: 42 TABLET | Refills: 0 | Status: SHIPPED | OUTPATIENT
Start: 2018-06-01 | End: 2018-06-25 | Stop reason: ALTCHOICE

## 2018-06-01 RX ORDER — ATORVASTATIN CALCIUM 20 MG/1
20 TABLET, FILM COATED ORAL
Qty: 30 TABLET | Refills: 5 | Status: SHIPPED | OUTPATIENT
Start: 2018-06-01 | End: 2018-07-02 | Stop reason: SDUPTHER

## 2018-06-01 RX ORDER — LEVOFLOXACIN 500 MG/1
500 TABLET, FILM COATED ORAL EVERY 24 HOURS
Qty: 10 TABLET | Refills: 0 | Status: SHIPPED | OUTPATIENT
Start: 2018-06-01 | End: 2018-06-11

## 2018-06-01 RX ORDER — DEXAMETHASONE SODIUM PHOSPHATE 4 MG/ML
4 INJECTION, SOLUTION INTRA-ARTICULAR; INTRALESIONAL; INTRAMUSCULAR; INTRAVENOUS; SOFT TISSUE ONCE
Status: COMPLETED | OUTPATIENT
Start: 2018-06-01 | End: 2018-06-01

## 2018-06-01 RX ORDER — GUAIFENESIN 600 MG
600 TABLET, EXTENDED RELEASE 12 HR ORAL EVERY 12 HOURS SCHEDULED
Qty: 20 TABLET | Refills: 0 | Status: SHIPPED | OUTPATIENT
Start: 2018-06-01 | End: 2018-06-25 | Stop reason: ALTCHOICE

## 2018-06-01 RX ORDER — TRAMADOL HYDROCHLORIDE 50 MG/1
50 TABLET ORAL 2 TIMES DAILY PRN
Qty: 60 TABLET | Refills: 5 | Status: SHIPPED | OUTPATIENT
Start: 2018-06-01 | End: 2019-03-01 | Stop reason: SDUPTHER

## 2018-06-01 RX ORDER — CARVEDILOL 3.12 MG/1
3.12 TABLET ORAL 2 TIMES DAILY WITH MEALS
Qty: 60 TABLET | Refills: 5 | Status: SHIPPED | OUTPATIENT
Start: 2018-06-01 | End: 2019-02-04 | Stop reason: SDUPTHER

## 2018-06-01 RX ADMIN — DEXAMETHASONE SODIUM PHOSPHATE 4 MG: 4 INJECTION, SOLUTION INTRA-ARTICULAR; INTRALESIONAL; INTRAMUSCULAR; INTRAVENOUS; SOFT TISSUE at 13:45

## 2018-06-01 RX ADMIN — ALBUTEROL SULFATE 2.5 MG: 2.5 SOLUTION RESPIRATORY (INHALATION) at 13:46

## 2018-06-01 RX ADMIN — Medication 0.5 MG: at 13:46

## 2018-06-01 NOTE — PROGRESS NOTES
Assessment/Plan:    No problem-specific Assessment & Plan notes found for this encounter  Diagnoses and all orders for this visit:    Essential hypertension  -     carvedilol (COREG) 3 125 mg tablet; Take 1 tablet (3 125 mg total) by mouth 2 (two) times a day with meals    Acute bronchitis, unspecified organism  -     albuterol inhalation solution 2 5 mg; Take 3 mL (2 5 mg total) by nebulization once   -     ipratropium (ATROVENT) 0 02 % inhalation solution 0 5 mg; Take 2 5 mL (0 5 mg total) by nebulization once   -     dexamethasone (DECADRON) injection 4 mg; Inject 1 mL (4 mg total) into the shoulder, thigh, or buttocks once   -     levofloxacin (LEVAQUIN) 500 mg tablet; Take 1 tablet (500 mg total) by mouth every 24 hours for 10 days  -     guaiFENesin (MUCINEX) 600 mg 12 hr tablet; Take 1 tablet (600 mg total) by mouth every 12 (twelve) hours  -     predniSONE 10 mg tablet; 60mg po q day x2, then 50mg x 2, then 40mg x 2, then 30mg x2, then 20mg x 2, and then 10mg x 2, then d/c   -     guaifenesin-codeine (GUAIFENESIN AC) 100-10 MG/5ML liquid; Take 5 mL by mouth 3 (three) times a day as needed for cough  -     Mini neb    COPD exacerbation (HCC)  -     albuterol inhalation solution 2 5 mg; Take 3 mL (2 5 mg total) by nebulization once   -     ipratropium (ATROVENT) 0 02 % inhalation solution 0 5 mg; Take 2 5 mL (0 5 mg total) by nebulization once   -     dexamethasone (DECADRON) injection 4 mg; Inject 1 mL (4 mg total) into the shoulder, thigh, or buttocks once   -     levofloxacin (LEVAQUIN) 500 mg tablet; Take 1 tablet (500 mg total) by mouth every 24 hours for 10 days  -     guaiFENesin (MUCINEX) 600 mg 12 hr tablet; Take 1 tablet (600 mg total) by mouth every 12 (twelve) hours  -     predniSONE 10 mg tablet; 60mg po q day x2, then 50mg x 2, then 40mg x 2, then 30mg x2, then 20mg x 2, and then 10mg x 2, then d/c   -     guaifenesin-codeine (GUAIFENESIN AC) 100-10 MG/5ML liquid;  Take 5 mL by mouth 3 (three) times a day as needed for cough  -     Mini neb    Mixed hyperlipidemia  -     atorvastatin (LIPITOR) 20 mg tablet; Take 1 tablet (20 mg total) by mouth daily at bedtime    Other depression  -     citalopram (CeleXA) 40 mg tablet; Take 1 tablet (40 mg total) by mouth daily    Other chronic pain  -     traMADol (ULTRAM) 50 mg tablet; Take 1 tablet (50 mg total) by mouth 2 (two) times a day as needed for moderate pain or severe pain      A/P: Rest and increase PO fluids  OTC motrin or tylenol prn  Will start an abx, steroid wean, mucincex and cough medicine  Continue home nebs  RTC one week  Consider PFT's if not done in the past      Subjective:      Patient ID: Anival Tinajero is a 60 y o  male  Former smoking, COPD WM presents with a three week h/o progressive SOB, productive yellow cough, and yellow nasal congestion  Notes intermittent fever and chills, sore throat, wheezing, and headache  No CP or palpitations  No edema, orthopnea, or PND  Using home nebulizer with little relief  No travel history and no one else is ill  Sinus Problem   Associated symptoms include chills, congestion, coughing, shortness of breath, sinus pressure and a sore throat  Pertinent negatives include no diaphoresis, ear pain or headaches  The following portions of the patient's history were reviewed and updated as appropriate:   He  has a past medical history of Congestive cardiomyopathy ([address] ); Congestive heart failure (CHF) ([address] ); COPD exacerbation ([address] ) (10/11/2017); Heartburn; Myocardial infarction ([address] ); Pneumonia; and Shortness of breath  He   Patient Active Problem List    Diagnosis Date Noted    COPD exacerbation ([address] ) 10/11/2017    Acute bronchitis 10/11/2017     He  has a past surgical history that includes Finger surgery (Left)  His family history includes Arthritis in his mother; Diabetes in his father and sister; Throat cancer in his father  He  reports that he has quit smoking   He has never used smokeless tobacco  He reports that he drinks alcohol  He reports that he does not use drugs    Current Outpatient Prescriptions   Medication Sig Dispense Refill    albuterol (VENTOLIN HFA) 90 mcg/act inhaler Inhale 2 puffs every 4 (four) hours as needed      aspirin 81 MG tablet Take 1 tablet by mouth daily      atorvastatin (LIPITOR) 20 mg tablet Take 1 tablet (20 mg total) by mouth daily at bedtime 30 tablet 5    carvedilol (COREG) 3 125 mg tablet Take 1 tablet (3 125 mg total) by mouth 2 (two) times a day with meals 60 tablet 5    citalopram (CeleXA) 40 mg tablet Take 1 tablet (40 mg total) by mouth daily 30 tablet 5    furosemide (LASIX) 40 mg tablet   3    glucose blood (FREESTYLE INSULINX TEST) test strip by In Vitro route      hydrOXYzine HCL (ATARAX) 25 mg tablet 25 mg 3 (three) times a day    3    ipratropium-albuterol (DUO-NEB) 0 5-2 5 mg/3 mL Inhale 2 (two) times a day      lisinopril (ZESTRIL) 10 mg tablet Take 10 mg by mouth daily    3    metFORMIN (GLUCOPHAGE) 1000 MG tablet Take 1 tablet (1,000 mg total) by mouth 2 (two) times a day with meals 90 tablet 3    nitroglycerin (NITROSTAT) 0 4 mg SL tablet Place under the tongue      traMADol (ULTRAM) 50 mg tablet Take 1 tablet (50 mg total) by mouth 2 (two) times a day as needed for moderate pain or severe pain 60 tablet 5    guaiFENesin (MUCINEX) 600 mg 12 hr tablet Take 1 tablet (600 mg total) by mouth every 12 (twelve) hours 20 tablet 0    guaifenesin-codeine (GUAIFENESIN AC) 100-10 MG/5ML liquid Take 5 mL by mouth 3 (three) times a day as needed for cough 120 mL 0    levofloxacin (LEVAQUIN) 500 mg tablet Take 1 tablet (500 mg total) by mouth every 24 hours for 10 days 10 tablet 0    predniSONE 10 mg tablet 60mg po q day x2, then 50mg x 2, then 40mg x 2, then 30mg x2, then 20mg x 2, and then 10mg x 2, then d/c  42 tablet 0     Current Facility-Administered Medications   Medication Dose Route Frequency Provider Last Rate Last Dose    albuterol inhalation solution 2 5 mg  2 5 mg Nebulization Once Beni North,         dexamethasone (DECADRON) injection 4 mg  4 mg Intramuscular Once Beni North, DO        ipratropium (ATROVENT) 0 02 % inhalation solution 0 5 mg  0 5 mg Nebulization Once Beni North,          Current Outpatient Prescriptions on File Prior to Visit   Medication Sig    albuterol (VENTOLIN HFA) 90 mcg/act inhaler Inhale 2 puffs every 4 (four) hours as needed    aspirin 81 MG tablet Take 1 tablet by mouth daily    furosemide (LASIX) 40 mg tablet     glucose blood (FREESTYLE INSULINX TEST) test strip by In Vitro route    hydrOXYzine HCL (ATARAX) 25 mg tablet 25 mg 3 (three) times a day      ipratropium-albuterol (DUO-NEB) 0 5-2 5 mg/3 mL Inhale 2 (two) times a day    lisinopril (ZESTRIL) 10 mg tablet Take 10 mg by mouth daily      metFORMIN (GLUCOPHAGE) 1000 MG tablet Take 1 tablet (1,000 mg total) by mouth 2 (two) times a day with meals    nitroglycerin (NITROSTAT) 0 4 mg SL tablet Place under the tongue    [DISCONTINUED] atorvastatin (LIPITOR) 20 mg tablet TAKE ONE (1) TABLET BY MOUTH AT BEDTIME    [DISCONTINUED] carvedilol (COREG) 3 125 mg tablet     [DISCONTINUED] citalopram (CeleXA) 40 mg tablet TAKE ONE (1) TABLET BY MOUTH ONCE DAILY    [DISCONTINUED] traMADol (ULTRAM) 50 mg tablet Take 1 tablet by mouth 2 (two) times a day as needed    [DISCONTINUED] doxycycline hyclate (VIBRAMYCIN) 100 mg capsule Take 1 capsule by mouth every 12 (twelve) hours     No current facility-administered medications on file prior to visit  He is allergic to penicillins       Review of Systems   Constitutional: Positive for chills, fatigue and fever  Negative for activity change and diaphoresis  HENT: Positive for congestion, postnasal drip, rhinorrhea, sinus pain, sinus pressure and sore throat  Negative for ear discharge, ear pain and facial swelling  Respiratory: Positive for cough, shortness of breath and wheezing  Negative for chest tightness  Cardiovascular: Negative for chest pain, palpitations and leg swelling  Gastrointestinal: Negative for abdominal pain, constipation, diarrhea, nausea and vomiting  Genitourinary: Negative for difficulty urinating, dysuria and frequency  Musculoskeletal: Negative for arthralgias, gait problem and myalgias  Neurological: Negative for light-headedness and headaches  Psychiatric/Behavioral: Negative for confusion  The patient is not nervous/anxious  Objective:      /88 (BP Location: Left arm, Patient Position: Sitting, Cuff Size: Adult)   Pulse 104   Temp 98 8 °F (37 1 °C) (Tympanic)   Resp 18   Ht 5' 8" (1 727 m)   Wt 76 2 kg (168 lb)   SpO2 98%   BMI 25 54 kg/m²          Physical Exam   Constitutional: He is oriented to person, place, and time  He appears well-developed and well-nourished  No distress  HENT:   Head: Normocephalic and atraumatic  Right Ear: External ear normal    Left Ear: External ear normal    Sinus tenderness noted with turbinates red and inflamed  Cobblestoning noted  Eyes: Conjunctivae and EOM are normal  Pupils are equal, round, and reactive to light  Neck: Neck supple  No JVD present  Cardiovascular: Normal rate, regular rhythm and normal heart sounds  No murmur heard  Pulmonary/Chest: Effort normal  No respiratory distress  He has wheezes  He has no rales  Wheezing with forced expiration  No r/r  Slight squawk left based that cleared  Slightly decreased BS  WOB wnl  Musculoskeletal: He exhibits no edema  Lymphadenopathy:     He has no cervical adenopathy  Neurological: He is alert and oriented to person, place, and time  Psychiatric: He has a normal mood and affect  His behavior is normal  Judgment and thought content normal    Nursing note and vitals reviewed             Mini neb     Date/Time 6/1/2018 1:35 PM     Performed by  Lidia Villanueva     Authorized by Lidia Villanueva       Consent: Verbal consent obtained  Consent given by: patient  Patient understanding: patient states understanding of the procedure being performed  Patient consent: the patient's understanding of the procedure matches consent given  Patient identity confirmed: verbally with patient     Procedure Details   Procedure Notes: After duoneb, increase BS and no w/r/r  WOB wnl     Patient tolerance: Patient tolerated the procedure well with no immediate complications

## 2018-06-01 NOTE — PATIENT INSTRUCTIONS

## 2018-06-10 PROBLEM — F43.10 POST TRAUMATIC STRESS DISORDER (PTSD): Status: ACTIVE | Noted: 2017-08-02

## 2018-06-25 ENCOUNTER — OFFICE VISIT (OUTPATIENT)
Dept: INTERNAL MEDICINE CLINIC | Facility: CLINIC | Age: 63
End: 2018-06-25
Payer: COMMERCIAL

## 2018-06-25 VITALS
OXYGEN SATURATION: 96 % | WEIGHT: 169 LBS | SYSTOLIC BLOOD PRESSURE: 132 MMHG | DIASTOLIC BLOOD PRESSURE: 76 MMHG | HEIGHT: 68 IN | BODY MASS INDEX: 25.61 KG/M2 | HEART RATE: 74 BPM | TEMPERATURE: 97.9 F

## 2018-06-25 DIAGNOSIS — Z12.5 SCREENING FOR PROSTATE CANCER: ICD-10-CM

## 2018-06-25 DIAGNOSIS — N52.8 OTHER MALE ERECTILE DYSFUNCTION: ICD-10-CM

## 2018-06-25 DIAGNOSIS — E11.9 TYPE 2 DIABETES MELLITUS WITHOUT COMPLICATION, WITHOUT LONG-TERM CURRENT USE OF INSULIN (HCC): Primary | ICD-10-CM

## 2018-06-25 PROCEDURE — 99213 OFFICE O/P EST LOW 20 MIN: CPT | Performed by: NURSE PRACTITIONER

## 2018-06-25 RX ORDER — SILDENAFIL CITRATE 20 MG/1
20 TABLET ORAL 3 TIMES DAILY
Qty: 15 TABLET | Refills: 0 | Status: SHIPPED | OUTPATIENT
Start: 2018-06-25 | End: 2018-08-20 | Stop reason: SDUPTHER

## 2018-06-25 NOTE — PROGRESS NOTES
Assessment/Plan:    No problem-specific Assessment & Plan notes found for this encounter  Diagnoses and all orders for this visit:    Type 2 diabetes mellitus without complication, without long-term current use of insulin ([facility])  Comments:  Pt cont with Metformin 1000mg FBS was 78 today  labwork ordered  Orders:  -     Glucose, fasting; Future  -     Hemoglobin A1C; Future    Other male erectile dysfunction  Comments:  low dose Viagra generic provided Testosterone level ordered  Orders:  -     sildenafil (REVATIO) 20 mg tablet; Take 1 tablet (20 mg total) by mouth 3 (three) times a day  -     Testosterone; Future          Subjective:      Patient ID: Kwaku Apple is a 60 y o  male  presents today for ongoing anxiety and depression  Pt reports he is attending counseling and is taking his Celexa  Is very upset because he has Erectile Dysfunction  Erectile Dysfunction  Patient complains of erectile dysfunction  Onset of dysfunction was years ago and was unknown in onset  Patient states the nature of difficulty is both attaining and maintaining erection  Full erections occur never  Partial erections occur never  Libido is affected  Risk factors for ED include cardiovascular disease, diabetes mellitus and antihypertensive medications,   Patient denies history of cardiovascular disease, diabetes mellitus and antihypertensive medications,   Patient's expectations as to sexual function would just like to enjoy intercourse with his younger girlfriend  Patient's description of relationship with partner   Previous treatment of ED includes Viagra          The following portions of the patient's history were reviewed and updated as appropriate:   He  has a past medical history of Anxiety (7/9/2014); Congestive cardiomyopathy ([facility] ); Congestive heart failure (CHF) ([facility] ); COPD exacerbation ([facility] ) (10/11/2017); Depression (1/4/2016); Heartburn; Hypertension (3/26/2014); Myocardial infarction ([address] ); Pneumonia;  Shortness of breath; and Type 2 diabetes mellitus ([place] [address] ) (3/26/2014)  He   Patient Active Problem List    Diagnosis Date Noted    Other male erectile dysfunction 06/25/2018    COPD exacerbation ([place] [address] ) 10/11/2017    Acute bronchitis 10/11/2017    Post traumatic stress disorder (PTSD) 08/02/2017    Abnormal TSH 12/07/2016    Allergic urticaria 08/10/2016    Back pain, chronic 08/10/2016    Depression 01/04/2016    Anxiety 07/09/2014    Hyperlipidemia 07/09/2014    Dilated cardiomyopathy ([place] [address] ) 06/05/2014    Heart failure, systolic, with acute decompensation ([place] [address] ) 05/01/2014    Hypertension 03/26/2014    Ischemic cardiomyopathy 03/26/2014    Type 2 diabetes mellitus ([place] [address] ) 03/26/2014     He  has a past surgical history that includes Finger surgery (Left)  His family history includes Arthritis in his mother; Diabetes in his father and sister; Throat cancer in his father  He  reports that he has quit smoking  He has never used smokeless tobacco  He reports that he drinks alcohol  He reports that he does not use drugs    Current Outpatient Prescriptions   Medication Sig Dispense Refill    albuterol (VENTOLIN HFA) 90 mcg/act inhaler Inhale 2 puffs every 4 (four) hours as needed      aspirin 81 MG tablet Take 1 tablet by mouth daily      atorvastatin (LIPITOR) 20 mg tablet Take 1 tablet (20 mg total) by mouth daily at bedtime 30 tablet 5    carvedilol (COREG) 3 125 mg tablet Take 1 tablet (3 125 mg total) by mouth 2 (two) times a day with meals 60 tablet 5    citalopram (CeleXA) 40 mg tablet Take 1 tablet (40 mg total) by mouth daily 30 tablet 5    furosemide (LASIX) 40 mg tablet   3    glucose blood (FREESTYLE INSULINX TEST) test strip by In Vitro route      ipratropium-albuterol (DUO-NEB) 0 5-2 5 mg/3 mL Inhale 2 (two) times a day      lisinopril (ZESTRIL) 10 mg tablet Take 10 mg by mouth daily    3    metFORMIN (GLUCOPHAGE) 1000 MG tablet Take 1 tablet (1,000 mg total) by mouth 2 (two) times a day with meals 90 tablet 3    nitroglycerin (NITROSTAT) 0 4 mg SL tablet Place under the tongue      sildenafil (REVATIO) 20 mg tablet Take 1 tablet (20 mg total) by mouth 3 (three) times a day 15 tablet 0    traMADol (ULTRAM) 50 mg tablet Take 1 tablet (50 mg total) by mouth 2 (two) times a day as needed for moderate pain or severe pain 60 tablet 5     No current facility-administered medications for this visit        Current Outpatient Prescriptions on File Prior to Visit   Medication Sig    albuterol (VENTOLIN HFA) 90 mcg/act inhaler Inhale 2 puffs every 4 (four) hours as needed    aspirin 81 MG tablet Take 1 tablet by mouth daily    atorvastatin (LIPITOR) 20 mg tablet Take 1 tablet (20 mg total) by mouth daily at bedtime    carvedilol (COREG) 3 125 mg tablet Take 1 tablet (3 125 mg total) by mouth 2 (two) times a day with meals    citalopram (CeleXA) 40 mg tablet Take 1 tablet (40 mg total) by mouth daily    furosemide (LASIX) 40 mg tablet     glucose blood (FREESTYLE INSULINX TEST) test strip by In Vitro route    ipratropium-albuterol (DUO-NEB) 0 5-2 5 mg/3 mL Inhale 2 (two) times a day    lisinopril (ZESTRIL) 10 mg tablet Take 10 mg by mouth daily      metFORMIN (GLUCOPHAGE) 1000 MG tablet Take 1 tablet (1,000 mg total) by mouth 2 (two) times a day with meals    nitroglycerin (NITROSTAT) 0 4 mg SL tablet Place under the tongue    traMADol (ULTRAM) 50 mg tablet Take 1 tablet (50 mg total) by mouth 2 (two) times a day as needed for moderate pain or severe pain    [DISCONTINUED] guaiFENesin (MUCINEX) 600 mg 12 hr tablet Take 1 tablet (600 mg total) by mouth every 12 (twelve) hours    [DISCONTINUED] guaifenesin-codeine (GUAIFENESIN AC) 100-10 MG/5ML liquid Take 5 mL by mouth 3 (three) times a day as needed for cough    [DISCONTINUED] hydrOXYzine HCL (ATARAX) 25 mg tablet 25 mg 3 (three) times a day      [DISCONTINUED] predniSONE 10 mg tablet 60mg po q day x2, then 50mg x 2, then 40mg x 2, then 30mg x2, then 20mg x 2, and then 10mg x 2, then d/c  No current facility-administered medications on file prior to visit  He is allergic to penicillins       Review of Systems   Constitutional: Negative  Negative for appetite change and fatigue  HENT: Negative  Negative for congestion, ear pain, rhinorrhea and sinus pain  Eyes: Negative  Negative for pain, itching and visual disturbance  Respiratory: Negative  Negative for cough, shortness of breath and wheezing  Cardiovascular: Negative  Negative for chest pain, palpitations and leg swelling  Gastrointestinal: Negative  Negative for abdominal pain, constipation, diarrhea, nausea and vomiting  Endocrine: Negative  Negative for polydipsia, polyphagia and polyuria  Genitourinary: Negative  Negative for difficulty urinating, frequency and urgency  Erectile dysfunction     Musculoskeletal: Negative  Negative for back pain and joint swelling  Skin: Negative  Negative for rash and wound  Allergic/Immunologic: Negative  Neurological: Negative  Negative for dizziness, weakness and headaches  Hematological: Negative  Negative for adenopathy  Does not bruise/bleed easily  Psychiatric/Behavioral: Positive for sleep disturbance  Negative for behavioral problems  The patient is nervous/anxious  Objective:      /76   Pulse 74   Temp 97 9 °F (36 6 °C)   Ht 5' 8" (1 727 m)   Wt 76 7 kg (169 lb)   SpO2 96%   BMI 25 70 kg/m²          Physical Exam   Constitutional: He is oriented to person, place, and time  He appears well-developed and well-nourished  HENT:   Head: Normocephalic  Eyes: Conjunctivae and EOM are normal  Pupils are equal, round, and reactive to light  Neck: Normal range of motion  Neck supple  Cardiovascular: Normal rate and regular rhythm  Pulmonary/Chest: Effort normal and breath sounds normal    Abdominal: Soft  Bowel sounds are normal    Musculoskeletal: Normal range of motion     Neurological: He is alert and oriented to person, place, and time  Skin: Skin is warm and dry  Psychiatric: His behavior is normal  Judgment and thought content normal  His mood appears anxious  He exhibits a depressed mood

## 2018-06-25 NOTE — PATIENT INSTRUCTIONS
Sildenafil (By mouth)   Sildenafil ([id number])  Treats erectile dysfunction  Also treats pulmonary arterial hypertension (high blood pressure in the lungs)  Brand Name(s): Revatio Viagra   There may be other brand names for this medicine  When This Medicine Should Not Be Used: This medicine is not right for everyone  Do not use it if you had an allergic reaction to sildenafil  How to Use This Medicine:   Tablet, Liquid  · Your doctor will tell you how much medicine to use  Do not use more than directed  · For erectile dysfunction: Take this medicine about 1 hour before you have sex  Do not take it more than once a day  Always allow at least 24 hours between doses  · For pulmonary arterial hypertension:   ¨ Take this medicine 3 times a day, 4 to 6 hours apart  ¨ If you miss a dose, take it as soon as you remember  If it is almost time for your next dose, wait until then and take a regular dose  Do not take extra medicine to make up for a missed dose  · Oral liquid: Shake the bottle well for at least 10 seconds  Use the oral syringe provided in the package to measure each dose  Wash the syringe after each use  · Read and follow the patient instructions that come with this medicine  Talk to your doctor or pharmacist if you have any questions  · Store the medicine in a closed container at room temperature, away from heat, moisture, and direct light  · Throw away any unused mixed oral liquid after 60 days  Drugs and Foods to Avoid:   Ask your doctor or pharmacist before using any other medicine, including over-the-counter medicines, vitamins, and herbal products  · Do not use this medicine if you also use riociguat or a nitrate medicine  Do not take other medicines that contain sildenafil or similar medicines, such as tadalafil or vardenafil  · Some medicines can affect how sildenafil works   Tell your doctor if you are using any of the following:   ¨ Amlodipine, atazanavir, bosentan, cimetidine, erythromycin, indinavir, itraconazole, ketoconazole, rifampin, ritonavir, saquinavir  ¨ Medicine for prostate problems or high blood pressure (including alfuzosin, doxazosin, prazosin, silodosin, tamsulosin, terazosin)  Warnings While Using This Medicine:   · Tell your doctor if you are pregnant or breastfeeding, or if you have kidney disease, liver disease, pulmonary veno-occlusive disease, diabetes, bleeding problems, leukemia, multiple myeloma, sickle cell anemia, a stomach ulcer, or eye problems  Tell your doctor if you have angina or chest pain during sex, heart disease, heart rhythm problems, high or low blood pressure, or a history of heart attack or stroke  Also tell your doctor if you smoke  · Tell any doctor who treats you that you take sildenafil  · This medicine may cause the following problems:   ¨ Low blood pressure (especially if taken with other medicines that lower blood pressure)  ¨ Heart problems  ¨ Painful or prolonged erection  ¨ Vision or hearing problems  · Keep all medicine out of the reach of children  Never share your medicine with anyone    Possible Side Effects While Using This Medicine:   Call your doctor right away if you notice any of these side effects:  · Allergic reaction: Itching or hives, swelling in your face or hands, swelling or tingling in your mouth or throat, chest tightness, trouble breathing  · Chest pain, trouble breathing, sudden or severe headache  · Fast, slow, pounding, or uneven heartbeat  · Lightheadedness, fainting  · Painful erection or an erection that lasts longer than 4 hours  · Sudden loss of vision  · Sudden decrease in hearing or hearing loss, ringing in the ears, dizziness  If you notice these less serious side effects, talk with your doctor:   · Headache  · Nosebleeds  · Stuffy or runny nose  · Upset stomach  · Warmth or redness in your face, neck, arms, or upper chest  If you notice other side effects that you think are caused by this medicine, tell your doctor  Call your doctor for medical advice about side effects  You may report side effects to FDA at 2-855-FDA-8866  © 2017 [address] [employer] Information is for End User's use only and may not be sold, redistributed or otherwise used for commercial purposes  The above information is an  only  It is not intended as medical advice for individual conditions or treatments  Talk to your doctor, nurse or pharmacist before following any medical regimen to see if it is safe and effective for you

## 2018-07-02 DIAGNOSIS — E78.2 MIXED HYPERLIPIDEMIA: ICD-10-CM

## 2018-07-02 RX ORDER — ATORVASTATIN CALCIUM 20 MG/1
20 TABLET, FILM COATED ORAL
Qty: 90 TABLET | Refills: 0 | Status: SHIPPED | OUTPATIENT
Start: 2018-07-02 | End: 2018-12-10 | Stop reason: SDUPTHER

## 2018-07-30 ENCOUNTER — TELEPHONE (OUTPATIENT)
Dept: INTERNAL MEDICINE CLINIC | Facility: CLINIC | Age: 63
End: 2018-07-30

## 2018-07-31 ENCOUNTER — TRANSCRIBE ORDERS (OUTPATIENT)
Dept: LAB | Facility: HOSPITAL | Age: 63
End: 2018-07-31

## 2018-07-31 DIAGNOSIS — N52.8 MIXED ERECTILE DYSFUNCTION: Primary | ICD-10-CM

## 2018-08-07 ENCOUNTER — APPOINTMENT (OUTPATIENT)
Dept: LAB | Facility: HOSPITAL | Age: 63
End: 2018-08-07
Payer: COMMERCIAL

## 2018-08-07 DIAGNOSIS — E11.9 TYPE 2 DIABETES MELLITUS WITHOUT COMPLICATION, WITHOUT LONG-TERM CURRENT USE OF INSULIN (HCC): ICD-10-CM

## 2018-08-07 DIAGNOSIS — I42.0 DILATED CARDIOMYOPATHY (HCC): ICD-10-CM

## 2018-08-07 DIAGNOSIS — N52.8 OTHER MALE ERECTILE DYSFUNCTION: ICD-10-CM

## 2018-08-07 DIAGNOSIS — N52.8 MIXED ERECTILE DYSFUNCTION: ICD-10-CM

## 2018-08-07 DIAGNOSIS — Z12.5 SCREENING FOR PROSTATE CANCER: ICD-10-CM

## 2018-08-07 LAB
BASOPHILS # BLD AUTO: 0.1 THOUSANDS/ΜL (ref 0–0.1)
BASOPHILS NFR BLD AUTO: 1 % (ref 0–1)
EOSINOPHIL # BLD AUTO: 0.38 THOUSAND/ΜL (ref 0–0.61)
EOSINOPHIL NFR BLD AUTO: 4 % (ref 0–6)
ERYTHROCYTE [DISTWIDTH] IN BLOOD BY AUTOMATED COUNT: 13.5 % (ref 11.6–15.1)
GLUCOSE P FAST SERPL-MCNC: 133 MG/DL (ref 65–99)
HCT VFR BLD AUTO: 46.2 % (ref 36.5–49.3)
HGB BLD-MCNC: 14.6 G/DL (ref 12–17)
IMM GRANULOCYTES # BLD AUTO: 0.02 THOUSAND/UL (ref 0–0.2)
IMM GRANULOCYTES NFR BLD AUTO: 0 % (ref 0–2)
LYMPHOCYTES # BLD AUTO: 2.2 THOUSANDS/ΜL (ref 0.6–4.47)
LYMPHOCYTES NFR BLD AUTO: 24 % (ref 14–44)
MCH RBC QN AUTO: 29.7 PG (ref 26.8–34.3)
MCHC RBC AUTO-ENTMCNC: 31.6 G/DL (ref 31.4–37.4)
MCV RBC AUTO: 94 FL (ref 82–98)
MONOCYTES # BLD AUTO: 0.9 THOUSAND/ΜL (ref 0.17–1.22)
MONOCYTES NFR BLD AUTO: 10 % (ref 4–12)
NEUTROPHILS # BLD AUTO: 5.63 THOUSANDS/ΜL (ref 1.85–7.62)
NEUTS SEG NFR BLD AUTO: 61 % (ref 43–75)
NRBC BLD AUTO-RTO: 0 /100 WBCS
PLATELET # BLD AUTO: 307 THOUSANDS/UL (ref 149–390)
PMV BLD AUTO: 9.7 FL (ref 8.9–12.7)
PSA SERPL-MCNC: 2.5 NG/ML (ref 0–4)
RBC # BLD AUTO: 4.91 MILLION/UL (ref 3.88–5.62)
TESTOST SERPL-MCNC: 499 NG/DL (ref 95–948)
VENIPUNCTURE: NORMAL
WBC # BLD AUTO: 9.23 THOUSAND/UL (ref 4.31–10.16)

## 2018-08-07 PROCEDURE — 36415 COLL VENOUS BLD VENIPUNCTURE: CPT

## 2018-08-07 PROCEDURE — 84403 ASSAY OF TOTAL TESTOSTERONE: CPT

## 2018-08-07 PROCEDURE — 84153 ASSAY OF PSA TOTAL: CPT

## 2018-08-07 PROCEDURE — 85025 COMPLETE CBC W/AUTO DIFF WBC: CPT

## 2018-08-07 PROCEDURE — 82947 ASSAY GLUCOSE BLOOD QUANT: CPT

## 2018-08-17 ENCOUNTER — APPOINTMENT (EMERGENCY)
Dept: CT IMAGING | Facility: HOSPITAL | Age: 63
End: 2018-08-17
Payer: COMMERCIAL

## 2018-08-17 ENCOUNTER — HOSPITAL ENCOUNTER (EMERGENCY)
Facility: HOSPITAL | Age: 63
Discharge: HOME | End: 2018-08-17
Attending: EMERGENCY MEDICINE
Payer: COMMERCIAL

## 2018-08-17 ENCOUNTER — APPOINTMENT (EMERGENCY)
Dept: RADIOLOGY | Facility: HOSPITAL | Age: 63
End: 2018-08-17
Payer: COMMERCIAL

## 2018-08-17 VITALS
DIASTOLIC BLOOD PRESSURE: 67 MMHG | WEIGHT: 176 LBS | TEMPERATURE: 98.1 F | OXYGEN SATURATION: 99 % | BODY MASS INDEX: 26.76 KG/M2 | RESPIRATION RATE: 18 BRPM | SYSTOLIC BLOOD PRESSURE: 143 MMHG | HEART RATE: 82 BPM

## 2018-08-17 DIAGNOSIS — T14.8XXA SKIN ABRASION: ICD-10-CM

## 2018-08-17 DIAGNOSIS — F10.929 ALCOHOL INTOXICATION (HCC): ICD-10-CM
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
## 2018-08-17 DIAGNOSIS — S22.42XA CLOSED FRACTURE OF TWO RIBS OF LEFT SIDE, INITIAL ENCOUNTER: Primary | ICD-10-CM

## 2018-08-17 LAB
ANION GAP SERPL CALCULATED.3IONS-SCNC: 7 MMOL/L (ref 4–13)
APTT PPP: 31 SECONDS (ref 24–36)
BASOPHILS # BLD AUTO: 0.1 THOUSANDS/ΜL (ref 0–0.1)
BASOPHILS NFR BLD AUTO: 1 % (ref 0–2)
BUN SERPL-MCNC: 10 MG/DL (ref 7–25)
CALCIUM SERPL-MCNC: 9.2 MG/DL (ref 8.6–10.5)
CHLORIDE SERPL-SCNC: 99 MMOL/L (ref 98–107)
CO2 SERPL-SCNC: 29 MMOL/L (ref 21–31)
CREAT SERPL-MCNC: 0.96 MG/DL (ref 0.7–1.3)
EOSINOPHIL # BLD AUTO: 0.3 THOUSAND/ΜL (ref 0–0.61)
EOSINOPHIL NFR BLD AUTO: 3 % (ref 0–5)
ERYTHROCYTE [DISTWIDTH] IN BLOOD BY AUTOMATED COUNT: 14.2 % (ref 11.5–14.5)
ETHANOL SERPL-MCNC: 164.2 MG/DL
GFR SERPL CREATININE-BSD FRML MDRD: 84 ML/MIN/1.73SQ M
GLUCOSE SERPL-MCNC: 141 MG/DL (ref 65–99)
HCT VFR BLD AUTO: 44 % (ref 36.5–49.3)
HGB BLD-MCNC: 14.9 G/DL (ref 14–18)
INR PPP: 0.92 (ref 0.9–1.5)
LYMPHOCYTES # BLD AUTO: 2.5 THOUSANDS/ΜL (ref 0.6–4.47)
LYMPHOCYTES NFR BLD AUTO: 24 % (ref 21–51)
MCH RBC QN AUTO: 30.7 PG (ref 26–34)
MCHC RBC AUTO-ENTMCNC: 33.9 G/DL (ref 31–37)
MCV RBC AUTO: 91 FL (ref 81–99)
MONOCYTES # BLD AUTO: 0.8 THOUSAND/ΜL (ref 0.17–1.22)
MONOCYTES NFR BLD AUTO: 8 % (ref 2–12)
NEUTROPHILS # BLD AUTO: 6.7 THOUSANDS/ΜL (ref 1.4–6.5)
NEUTS SEG NFR BLD AUTO: 64 % (ref 42–75)
NRBC BLD AUTO-RTO: 0 /100 WBCS
PLATELET # BLD AUTO: 306 THOUSANDS/UL (ref 149–390)
PMV BLD AUTO: 7.6 FL (ref 8.6–11.7)
POTASSIUM SERPL-SCNC: 3.5 MMOL/L (ref 3.5–5.5)
PROTHROMBIN TIME: 10.7 SECONDS (ref 10.1–12.9)
RBC # BLD AUTO: 4.86 MILLION/UL (ref 4.3–5.9)
SODIUM SERPL-SCNC: 135 MMOL/L (ref 134–143)
WBC # BLD AUTO: 10.4 THOUSAND/UL (ref 4.8–10.8)

## 2018-08-17 PROCEDURE — 71101 X-RAY EXAM UNILAT RIBS/CHEST: CPT

## 2018-08-17 PROCEDURE — 70450 CT HEAD/BRAIN W/O DYE: CPT

## 2018-08-17 PROCEDURE — 80048 BASIC METABOLIC PNL TOTAL CA: CPT | Performed by: PHYSICIAN ASSISTANT

## 2018-08-17 PROCEDURE — 36415 COLL VENOUS BLD VENIPUNCTURE: CPT | Performed by: PHYSICIAN ASSISTANT

## 2018-08-17 PROCEDURE — 85025 COMPLETE CBC W/AUTO DIFF WBC: CPT | Performed by: PHYSICIAN ASSISTANT

## 2018-08-17 PROCEDURE — 72040 X-RAY EXAM NECK SPINE 2-3 VW: CPT

## 2018-08-17 PROCEDURE — 85610 PROTHROMBIN TIME: CPT | Performed by: PHYSICIAN ASSISTANT

## 2018-08-17 PROCEDURE — 80320 DRUG SCREEN QUANTALCOHOLS: CPT | Performed by: PHYSICIAN ASSISTANT

## 2018-08-17 PROCEDURE — 96360 HYDRATION IV INFUSION INIT: CPT

## 2018-08-17 PROCEDURE — 99284 EMERGENCY DEPT VISIT MOD MDM: CPT

## 2018-08-17 PROCEDURE — 85730 THROMBOPLASTIN TIME PARTIAL: CPT | Performed by: PHYSICIAN ASSISTANT

## 2018-08-17 RX ADMIN — SODIUM CHLORIDE 1000 ML: 0.9 INJECTION, SOLUTION INTRAVENOUS at 19:02

## 2018-08-17 NOTE — ED NOTES
Pt removed his c-collar, stating "it was choking me"  Refusing to have collar replaced   "my neck is fine"     Fernando Yarbrough, SYLVAIN  08/17/18 1949

## 2018-08-17 NOTE — ED NOTES
Pt reports he does not want to be here  Removing bp cuff, collar, climbing out of the stretcher, fell onto the floor  Pt offers no complaintk after the fall   Smells strongly of etoh   Slurring his words     Natale Olszewski, SYLVAIN  08/17/18 3840

## 2018-08-17 NOTE — ED PROVIDER NOTES
History  Chief Complaint   Patient presents with   Jen Richards Fall     pt states he tripped and fell, down a flight of wooden stairs  c/o lt sided rib area pain  abrasion and ecchymotic areas to lt arm   slight abrasion to lt knee, no bleeding  pt arrives with c-collar in place  ems called by pt's girlfriend  pt smells of etoh  per ems girlfriend states pt also fell yesterday  per pt he tripped on a sidewalk and fell  denies complaints     HPI  I signed into this chart as I was going to see the patient but actually did not see the patient  He was evaluated by my nurse practitioner assistant Ed  The case was discussed with him and I cosigned discharge but I did not evaluate him personally at the time      Prior to Admission Medications   Prescriptions Last Dose Informant Patient Reported? Taking?    albuterol (VENTOLIN HFA) 90 mcg/act inhaler   Yes Yes   Sig: Inhale 2 puffs every 4 (four) hours as needed   aspirin 81 MG tablet   Yes Yes   Sig: Take 1 tablet by mouth daily   atorvastatin (LIPITOR) 20 mg tablet   No Yes   Sig: Take 1 tablet (20 mg total) by mouth daily at bedtime   carvedilol (COREG) 3 125 mg tablet   No Yes   Sig: Take 1 tablet (3 125 mg total) by mouth 2 (two) times a day with meals   citalopram (CeleXA) 40 mg tablet   No Yes   Sig: Take 1 tablet (40 mg total) by mouth daily   furosemide (LASIX) 40 mg tablet   Yes Yes   Sig: Take 40 mg by mouth 2 (two) times a day     glucose blood (FREESTYLE INSULINX TEST) test strip   Yes No   Sig: by In Vitro route   ipratropium-albuterol (DUO-NEB) 0 5-2 5 mg/3 mL   Yes Yes   Sig: Inhale 2 (two) times a day   lisinopril (ZESTRIL) 10 mg tablet   Yes Yes   Sig: Take 10 mg by mouth 2 (two) times a day     metFORMIN (GLUCOPHAGE) 1000 MG tablet   No Yes   Sig: Take 1 tablet (1,000 mg total) by mouth 2 (two) times a day with meals   nitroglycerin (NITROSTAT) 0 4 mg SL tablet   Yes No   Sig: Place under the tongue   sildenafil (REVATIO) 20 mg tablet   No No   Sig: Take 1 tablet (20 mg total) by mouth 3 (three) times a day   traMADol (ULTRAM) 50 mg tablet   No No   Sig: Take 1 tablet (50 mg total) by mouth 2 (two) times a day as needed for moderate pain or severe pain      Facility-Administered Medications: None       Past Medical History:   Diagnosis Date    Anxiety 7/9/2014    Congestive cardiomyopathy (HCC)     [facility]     COPD exacerbation ([facility] ) 10/11/2017    Depression 1/4/2016    Heartburn     Hypertension 3/26/2014    Myocardial infarction ([place] [address] )     LAST ASSESSED: 5/7/14    Pneumonia     Shortness of breath     Type 2 diabetes mellitus ([place] [address] ) 3/26/2014       Past Surgical History:   Procedure Laterality Date    FINGER SURGERY Left     SURGERY ON LEFT INDEX FINGER       Family History   Problem Relation Age of Onset    Arthritis Mother     Diabetes Father         MELLITUS    Throat cancer Father         LARYNGEAL    Diabetes Sister         MELLITUS     I have reviewed and agree with the history as documented      Social History   Substance Use Topics    Smoking status: Former Smoker    Smokeless tobacco: Never Used    Alcohol use Yes      Comment: BEING A SOCIAL DRINKER        Review of Systems    Physical Exam  Physical Exam    Vital Signs  ED Triage Vitals   Temperature Pulse Respirations Blood Pressure SpO2   08/17/18 1753 08/17/18 1753 08/17/18 1753 08/17/18 1753 08/17/18 1800   98 1 °F (36 7 °C) 71 16 146/70 92 %      Temp Source Heart Rate Source Patient Position - Orthostatic VS BP Location FiO2 (%)   08/17/18 1753 08/17/18 1753 08/17/18 1753 08/17/18 1753 --   Temporal Monitor Lying Left arm       Pain Score       --                  Vitals:    08/17/18 1830 08/17/18 1845 08/17/18 1900 08/17/18 2113   BP: 127/66  137/71 143/67   Pulse: 70 67 69 82   Patient Position - Orthostatic VS:    Standing       Visual Acuity  Visual Acuity      Most Recent Value   L Pupil Size (mm)  4   R Pupil Size (mm)  4          ED Medications  Medications   sodium chloride 0 9 % bolus 1,000 mL (0 mL Intravenous Stopped 8/17/18 2000)       Diagnostic Studies  Results Reviewed     Procedure Component Value Units Date/Time    Ethanol [[id number]]  (Abnormal) Collected:  08/17/18 1902    Lab Status:  Final result Specimen:  Blood from Arm, Left Updated:  08/17/18 1933     Ethanol Lvl 164 2 (H) mg/dL     Basic metabolic panel [[id number]]  (Abnormal) Collected:  08/17/18 1902    Lab Status:  Final result Specimen:  Blood from Arm, Left Updated:  08/17/18 1933     Sodium 135 mmol/L      Potassium 3 5 mmol/L      Chloride 99 mmol/L      CO2 29 mmol/L      Anion Gap 7 mmol/L      BUN 10 mg/dL      Creatinine 0 96 mg/dL      Glucose 141 (H) mg/dL      Calcium 9 2 mg/dL      eGFR 84 ml/min/1 73sq m     Narrative:         National Kidney Disease Education Program recommendations are as follows:  GFR calculation is accurate only with a steady state creatinine  Chronic Kidney disease less than 60 ml/min/1 73 sq  meters  Kidney failure less than 15 ml/min/1 73 sq  meters      Protime-INR [[id number]]  (Normal) Collected:  08/17/18 1902    Lab Status:  Final result Specimen:  Blood from Arm, Left Updated:  08/17/18 1927     Protime 10 7 seconds      INR 0 92    APTT [[id number]]  (Normal) Collected:  08/17/18 1902    Lab Status:  Final result Specimen:  Blood from Arm, Left Updated:  08/17/18 1927     PTT 31 seconds     CBC and differential [[id number]]  (Abnormal) Collected:  08/17/18 1902    Lab Status:  Final result Specimen:  Blood from Arm, Left Updated:  08/17/18 1914     WBC 10 40 Thousand/uL      RBC 4 86 Million/uL      Hemoglobin 14 9 g/dL      Hematocrit 44 0 %      MCV 91 fL      MCH 30 7 pg      MCHC 33 9 g/dL      RDW 14 2 %      MPV 7 6 (L) fL      Platelets 779 Thousands/uL      nRBC 0 /100 WBCs      Neutrophils Relative 64 %      Lymphocytes Relative 24 %      Monocytes Relative 8 %      Eosinophils Relative 3 %      Basophils Relative 1 % Neutrophils Absolute 6 70 (H) Thousands/µL      Lymphocytes Absolute 2 50 Thousands/µL      Monocytes Absolute 0 80 Thousand/µL      Eosinophils Absolute 0 30 Thousand/µL      Basophils Absolute 0 10 Thousands/µL                  XR ribs with pa chest min 3 views LEFT   Final Result by Robbie Mena (08/17 2044)      Acute left anterior lateral fourth and fifth rib fractures  No pleural effusion or pneumothorax identified  No focal consolidation  Signed by Robbie Mena MD      XR cervical spine 2 or 3 views   Final Result by Jin Degroot (08/17 2038)   No acute fracture or subluxation  Signed by Jin Degroot MD      CT head without contrast   Final Result by Jin Degroot (08/17 1957)   No acute intracranial findings  Signed by Jin Degroot MD                 Procedures  Procedures       Phone Contacts  ED Phone Contact    ED Course        I signed into this chart as I was going to see the patient but actually did not see the patient  He was evaluated by my nurse practitioner assistant Ed  The case was discussed with him and I cosigned discharge but I did not evaluate him personally at the time                          MDM  CritCare Time    Disposition  Final diagnoses:   Fall (on) (from) other stairs and steps, initial encounter   Skin abrasion   Alcohol intoxication ([facility] )   Closed fracture of two ribs of left side, initial encounter - 4th and 5th anterior     Time reflects when diagnosis was documented in both MDM as applicable and the Disposition within this note     Time User Action Codes Description Comment    8/17/2018  8:09 PM Destini Bright Fall (on) (from) other stairs and steps, initial encounter     8/17/2018  8:09 PM Padma Wing  8XXA] Skin abrasion     8/17/2018  8:36 PM Hadley Diehl Add [F10 929] Alcohol intoxication ([facility] )     8/17/2018  8:46 PM Emmett Diehl Add [S22 42XA] Closed fracture of two ribs of left side, initial encounter 8/17/2018  8:47 PM Sarai Diehl Modify [W10 8XXA] Fall (on) (from) other stairs and steps, initial encounter     8/17/2018  8:47 PM Stephen Riverner Modify [S22 42XA] Closed fracture of two ribs of left side, initial encounter     8/17/2018  8:47 PM KehindeHadley mcnair Modify [S22 42XA] Closed fracture of two ribs of left side, initial encounter 4th and 5th anterior      ED Disposition     ED Disposition Condition Comment    Discharge  Demarco Marie discharge to home/self care      Condition at discharge: Stable        Follow-up Information     Follow up With Specialties Details Why Contact Info    Leonides Neville DO Internal Medicine Schedule an appointment as soon as possible for a visit in 2 days  [address]  [phone]            Discharge Medication List as of 8/17/2018  8:48 PM      CONTINUE these medications which have NOT CHANGED    Details   albuterol (VENTOLIN HFA) 90 mcg/act inhaler Inhale 2 puffs every 4 (four) hours as needed, Starting Mon 1/4/2016, Historical Med      aspirin 81 MG tablet Take 1 tablet by mouth daily, Starting Wed 3/26/2014, Historical Med      atorvastatin (LIPITOR) 20 mg tablet Take 1 tablet (20 mg total) by mouth daily at bedtime, Starting Mon 7/2/2018, Normal      carvedilol (COREG) 3 125 mg tablet Take 1 tablet (3 125 mg total) by mouth 2 (two) times a day with meals, Starting Fri 6/1/2018, Normal      citalopram (CeleXA) 40 mg tablet Take 1 tablet (40 mg total) by mouth daily, Starting Fri 6/1/2018, Normal      furosemide (LASIX) 40 mg tablet Take 40 mg by mouth 2 (two) times a day  , Starting Thu 1/18/2018, Historical Med      ipratropium-albuterol (DUO-NEB) 0 5-2 5 mg/3 mL Inhale 2 (two) times a day, Starting Wed 10/11/2017, Historical Med      lisinopril (ZESTRIL) 10 mg tablet Take 10 mg by mouth 2 (two) times a day  , Starting Thu 1/18/2018, Historical Med      metFORMIN (GLUCOPHAGE) 1000 MG tablet Take 1 tablet (1,000 mg total) by mouth 2 (two) times a day with meals, Starting Wed 2/28/2018, Normal      glucose blood (FREESTYLE INSULINX TEST) test strip by In Vitro route, Starting Wed 3/26/2014, Historical Med      nitroglycerin (NITROSTAT) 0 4 mg SL tablet Place under the tongue, Historical Med      sildenafil (REVATIO) 20 mg tablet Take 1 tablet (20 mg total) by mouth 3 (three) times a day, Starting Mon 6/25/2018, Normal      traMADol (ULTRAM) 50 mg tablet Take 1 tablet (50 mg total) by mouth 2 (two) times a day as needed for moderate pain or severe pain, Starting Fri 6/1/2018, Normal           No discharge procedures on file      ED Provider  Electronically Signed by           Nohemy Gifford DO  08/18/18 9815

## 2018-08-17 NOTE — ED PROVIDER NOTES
History  Chief Complaint   Patient presents with   Trista Jimenes Fall     pt states he tripped and fell, down a flight of wooden stairs  c/o lt sided rib area pain  abrasion and ecchymotic areas to lt arm   slight abrasion to lt knee, no bleeding  pt arrives with c-collar in place  ems called by pt's girlfriend  pt smells of etoh  per ems girlfriend states pt also fell yesterday  per pt he tripped on a sidewalk and fell  denies complaints     27-year-old male presents for evaluation after a fall down the stairs today  There were approx 5-6 stairs that he fell backwards down  He admits to drinking 2 coors lights and having 4 shots of liqour  He also fell yesterday and hurt the left side of his ribs  He states he would have not come here, but his girlfriend called 911 because he was unable to stand after the fall  Upon initial evaluation, he states that he is not in any pain  He denies any head pain, neck pain, shortness breath, difficulty breathing, chest pain, abdominal pain, nausea/vomiting/diarrhea  Patient attempted to stand on his own to use the bathroom, fell down, and hit the front of his head in emergency room  Prior to Admission Medications   Prescriptions Last Dose Informant Patient Reported? Taking?    albuterol (VENTOLIN HFA) 90 mcg/act inhaler   Yes Yes   Sig: Inhale 2 puffs every 4 (four) hours as needed   aspirin 81 MG tablet   Yes Yes   Sig: Take 1 tablet by mouth daily   atorvastatin (LIPITOR) 20 mg tablet   No Yes   Sig: Take 1 tablet (20 mg total) by mouth daily at bedtime   carvedilol (COREG) 3 125 mg tablet   No Yes   Sig: Take 1 tablet (3 125 mg total) by mouth 2 (two) times a day with meals   citalopram (CeleXA) 40 mg tablet   No Yes   Sig: Take 1 tablet (40 mg total) by mouth daily   furosemide (LASIX) 40 mg tablet   Yes Yes   Sig: Take 40 mg by mouth 2 (two) times a day     glucose blood (FREESTYLE INSULINX TEST) test strip   Yes No   Sig: by In Vitro route   ipratropium-albuterol (DUO-NEB) 0 5-2 5 mg/3 mL   Yes Yes   Sig: Inhale 2 (two) times a day   lisinopril (ZESTRIL) 10 mg tablet   Yes Yes   Sig: Take 10 mg by mouth 2 (two) times a day     metFORMIN (GLUCOPHAGE) 1000 MG tablet   No Yes   Sig: Take 1 tablet (1,000 mg total) by mouth 2 (two) times a day with meals   nitroglycerin (NITROSTAT) 0 4 mg SL tablet   Yes No   Sig: Place under the tongue   sildenafil (REVATIO) 20 mg tablet   No No   Sig: Take 1 tablet (20 mg total) by mouth 3 (three) times a day   traMADol (ULTRAM) 50 mg tablet   No No   Sig: Take 1 tablet (50 mg total) by mouth 2 (two) times a day as needed for moderate pain or severe pain      Facility-Administered Medications: None       Past Medical History:   Diagnosis Date    Anxiety 7/9/2014    Congestive cardiomyopathy (HCC)     [facility]     COPD exacerbation ([facility] ) 10/11/2017    Depression 1/4/2016    Heartburn     Hypertension 3/26/2014    Myocardial infarction ([facility] )     LAST ASSESSED: 5/7/14    Pneumonia     Shortness of breath     Type 2 diabetes mellitus ([facility] ) 3/26/2014       Past Surgical History:   Procedure Laterality Date    FINGER SURGERY Left     SURGERY ON LEFT INDEX FINGER       Family History   Problem Relation Age of Onset    Arthritis Mother     Diabetes Father         MELLITUS    Throat cancer Father         LARYNGEAL    Diabetes Sister         MELLITUS     I have reviewed and agree with the history as documented  Social History   Substance Use Topics    Smoking status: Former Smoker    Smokeless tobacco: Never Used    Alcohol use Yes      Comment: BEING A SOCIAL DRINKER        Review of Systems   Constitutional: Negative for chills and fever  HENT: Negative for rhinorrhea and sore throat  Eyes: Negative for visual disturbance  Respiratory: Negative for cough and shortness of breath  Cardiovascular: Negative for chest pain and leg swelling     Gastrointestinal: Negative for abdominal pain, diarrhea, nausea and vomiting  Genitourinary: Negative for dysuria  Musculoskeletal: Negative for back pain and myalgias  Skin: Positive for wound  Neurological: Negative for dizziness and headaches  Psychiatric/Behavioral: Negative for confusion  All other systems reviewed and are negative  Physical Exam  Physical Exam   Constitutional: He appears well-developed and well-nourished  No distress  HENT:   Head: Normocephalic  Neck: Normal range of motion  Neck supple  No midline cervical spine tenderness to palpation  Patient has full range of motion in his neck in flexion, extension, lateral bending, rotation  Cardiovascular: Normal rate, regular rhythm and normal heart sounds  Pulmonary/Chest: Effort normal and breath sounds normal  No respiratory distress  He has no wheezes  He has no rales  Patient able to take deep inspiration without any pain  No visible bruising, ecchymosis, edema of the anterior/posterior chest    Abdominal: Normal appearance and bowel sounds are normal  There is no tenderness  There is no guarding and no CVA tenderness  Musculoskeletal:   Patient's gait is abnormal    Lymphadenopathy:     He has no cervical adenopathy  Neurological: He is alert  No cranial nerve deficit  Skin: Skin is warm and dry  He is not diaphoretic  Visible abrasions on left distal forearm  No visible flail chest     Nursing note and vitals reviewed        Vital Signs  ED Triage Vitals   Temperature Pulse Respirations Blood Pressure SpO2   08/17/18 1753 08/17/18 1753 08/17/18 1753 08/17/18 1753 08/17/18 1800   98 1 °F (36 7 °C) 71 16 146/70 92 %      Temp Source Heart Rate Source Patient Position - Orthostatic VS BP Location FiO2 (%)   08/17/18 1753 08/17/18 1753 08/17/18 1753 08/17/18 1753 --   Temporal Monitor Lying Left arm       Pain Score       --                  Vitals:    08/17/18 1815 08/17/18 1830 08/17/18 1845 08/17/18 1900   BP:  127/66  137/71   Pulse: 70 70 67 69   Patient Position - Orthostatic VS:           Visual Acuity  Visual Acuity      Most Recent Value   L Pupil Size (mm)  4   R Pupil Size (mm)  4          ED Medications  Medications   sodium chloride 0 9 % bolus 1,000 mL (1,000 mL Intravenous New Bag 8/17/18 1902)       Diagnostic Studies  Results Reviewed     Procedure Component Value Units Date/Time    Ethanol [[id number]]  (Abnormal) Collected:  08/17/18 1902    Lab Status:  Final result Specimen:  Blood from Arm, Left Updated:  08/17/18 1933     Ethanol Lvl 164 2 (H) mg/dL     Basic metabolic panel [[id number]]  (Abnormal) Collected:  08/17/18 1902    Lab Status:  Final result Specimen:  Blood from Arm, Left Updated:  08/17/18 1933     Sodium 135 mmol/L      Potassium 3 5 mmol/L      Chloride 99 mmol/L      CO2 29 mmol/L      Anion Gap 7 mmol/L      BUN 10 mg/dL      Creatinine 0 96 mg/dL      Glucose 141 (H) mg/dL      Calcium 9 2 mg/dL      eGFR 84 ml/min/1 73sq m     Narrative:         National Kidney Disease Education Program recommendations are as follows:  GFR calculation is accurate only with a steady state creatinine  Chronic Kidney disease less than 60 ml/min/1 73 sq  meters  Kidney failure less than 15 ml/min/1 73 sq  meters      Chris Lopez [[id number]]  (Normal) Collected:  08/17/18 1902    Lab Status:  Final result Specimen:  Blood from Arm, Left Updated:  08/17/18 1927     Protime 10 7 seconds      INR 0 92    APTT [[id number]]  (Normal) Collected:  08/17/18 1902    Lab Status:  Final result Specimen:  Blood from Arm, Left Updated:  08/17/18 1927     PTT 31 seconds     CBC and differential [[id number]]  (Abnormal) Collected:  08/17/18 1902    Lab Status:  Final result Specimen:  Blood from Arm, Left Updated:  08/17/18 1914     WBC 10 40 Thousand/uL      RBC 4 86 Million/uL      Hemoglobin 14 9 g/dL      Hematocrit 44 0 %      MCV 91 fL      MCH 30 7 pg      MCHC 33 9 g/dL      RDW 14 2 %      MPV 7 6 (L) fL      Platelets 801 Thousands/uL      nRBC 0 /100 WBCs Neutrophils Relative 64 %      Lymphocytes Relative 24 %      Monocytes Relative 8 %      Eosinophils Relative 3 %      Basophils Relative 1 %      Neutrophils Absolute 6 70 (H) Thousands/µL      Lymphocytes Absolute 2 50 Thousands/µL      Monocytes Absolute 0 80 Thousand/µL      Eosinophils Absolute 0 30 Thousand/µL      Basophils Absolute 0 10 Thousands/µL                  CT head without contrast   Final Result by Roseanne Walker (08/17 1957)   No acute intracranial findings  Signed by Roseanne Walker MD      XR cervical spine 2 or 3 views    (Results Pending)   XR ribs with pa chest min 3 views LEFT    (Results Pending)              Procedures  Procedures       Phone Contacts  ED Phone Contact    ED Course  ED Course as of Aug 17 2013   Fri Aug 17, 2018   1954 Eosinophils Absolute: 0 30                               MDM  Number of Diagnoses or Management Options  Fall (on) (from) other stairs and steps, initial encounter:   Skin abrasion:   Diagnosis management comments: Overall patient seems intoxicated, and a full review of systems is unobtainable  I will order imaging and lab work on the patient, as well as IV fluids  Discussed the case with Dr Ron Owens and he will assume care  Awaiting radiologic imaging read at this point  If WNL, plan is for discharge at this point         Amount and/or Complexity of Data Reviewed  Clinical lab tests: ordered and reviewed  Tests in the radiology section of CPT®: ordered and reviewed  Discuss the patient with other providers: yes      CritCare Time    Disposition  Final diagnoses:   Fall (on) (from) other stairs and steps, initial encounter   Skin abrasion     Time reflects when diagnosis was documented in both MDM as applicable and the Disposition within this note     Time User Action Codes Description Comment    8/17/2018  8:09 PM Shira Martinez Fall (on) (from) other stairs and steps, initial encounter     8/17/2018  8:09 PM Latoya Muro [T14 8XXA] Skin abrasion       ED Disposition     None      Follow-up Information    None         Patient's Medications   Discharge Prescriptions    No medications on file     No discharge procedures on file      ED Provider  Electronically Signed by           Gagan Wing PA-C  08/17/18 2013

## 2018-08-18 NOTE — ED NOTES
Pt's wishes to leave on his own to walk  Pt advised he cannot leave alone as he came in due to falling and had a witnessed fall here  Girlfriend called while speaking with pt: [phone], is calling someone to arrange        Gisselle Fofana RN  08/17/18 0310

## 2018-08-18 NOTE — ED NOTES
Pt asked to wait for a ride to arrive for him  Pt refused  Dr Gilford Cater aware, pt ok to leave  Pt left       Patsy Disla RN  08/17/18 2127

## 2018-08-18 NOTE — ED CARE HANDOFF
Emergency Department Sign Out Note        Sign out and transfer of care from [address] [place] [address]  See Separate Emergency Department note  The patient, Ildefonso Card, was evaluated by the previous provider for fall  Workup Completed:  Labs Reviewed   CBC AND DIFFERENTIAL - Abnormal        Result Value Ref Range Status    WBC 10 40  4 80 - 10 80 Thousand/uL Final    RBC 4 86  4 30 - 5 90 Million/uL Final    Hemoglobin 14 9  14 0 - 18 0 g/dL Final    Hematocrit 44 0  36 5 - 49 3 % Final    MCV 91  81 - 99 fL Final    MCH 30 7  26 0 - 34 0 pg Final    MCHC 33 9  31 0 - 37 0 g/dL Final    RDW 14 2  11 5 - 14 5 % Final    MPV 7 6 (*) 8 6 - 11 7 fL Final    Platelets 855  543 - 390 Thousands/uL Final    nRBC 0  /100 WBCs Final    Neutrophils Relative 64  42 - 75 % Final    Lymphocytes Relative 24  21 - 51 % Final    Monocytes Relative 8  2 - 12 % Final    Eosinophils Relative 3  0 - 5 % Final    Basophils Relative 1  0 - 2 % Final    Neutrophils Absolute 6 70 (*) 1 40 - 6 50 Thousands/µL Final    Lymphocytes Absolute 2 50  0 60 - 4 47 Thousands/µL Final    Monocytes Absolute 0 80  0 17 - 1 22 Thousand/µL Final    Eosinophils Absolute 0 30  0 00 - 0 61 Thousand/µL Final    Basophils Absolute 0 10  0 00 - 0 10 Thousands/µL Final   MEDICAL ALCOHOL - Abnormal     Ethanol Lvl 164 2 (*) <10 mg/dL Final   BASIC METABOLIC PANEL - Abnormal     Sodium 135  134 - 143 mmol/L Final    Potassium 3 5  3 5 - 5 5 mmol/L Final    Chloride 99  98 - 107 mmol/L Final    CO2 29  21 - 31 mmol/L Final    Anion Gap 7  4 - 13 mmol/L Final    BUN 10  7 - 25 mg/dL Final    Creatinine 0 96  0 70 - 1 30 mg/dL Final    Comment: Standardized to IDMS reference method    Glucose 141 (*) 65 - 99 mg/dL Final    Comment:   If the patient is fasting, the ADA then defines impaired fasting glucose as > 100 mg/dL and diabetes as > or equal to 123 mg/dL    Specimen collection should occur prior to Sulfasalazine administration due to the potential for falsely depressed results  Specimen collection should occur prior to Sulfapyridine administration due to the potential for falsely elevated results  Calcium 9 2  8 6 - 10 5 mg/dL Final    eGFR 84  ml/min/1 73sq m Final    Narrative:     National Kidney Disease Education Program recommendations are as follows:  GFR calculation is accurate only with a steady state creatinine  Chronic Kidney disease less than 60 ml/min/1 73 sq  meters  Kidney failure less than 15 ml/min/1 73 sq  meters  PROTIME-INR - Normal    Protime 10 7  10 1 - 12 9 seconds Final    Comment:      INR 0 92  0 90 - 1 50 Final    Comment:     APTT - Normal    PTT 31  24 - 36 seconds Final    Comment: Therapeutic Heparin Range =  60-90 seconds       ED Course / Workup Pending (followup):  2000 - Case discussed and care assumed from physician assistant pending imaging tests ordered for fall  Xr Ribs With Pa Chest Min 3 Views Left    Result Date: 8/17/2018  Narrative: INDICATION:  Left sided upper-mid anterior rib pain post fall down stairs in which patient hit his head; fell two days in a row  Additional History: Smoker (one pack per day for over fifty years), no cancer history  ORDERING PROVIDER:  Erum Del Rosario  TECHNIQUE:  Frontal chest (two images) and three views (four images) of the left ribs  COMPARISON:  None Available  FINDINGS:  Both lungs are clear  The heart and mediastinum are normal size and contour  No pleural effusion  No pneumothorax  Acute left anterior lateral fourth and fifth rib fractures  Impression: Acute left anterior lateral fourth and fifth rib fractures  No pleural effusion or pneumothorax identified  No focal consolidation  Signed by Juan Lindsay MD    Xr Cervical Spine 2 Or 3 Views    Result Date: 8/17/2018  Narrative: INDICATION:  Left sided upper-mid anterior rib pain post fall in which patient hit his head; fell two days in a row   Additional History: Smoker (one pack per day for over fifty years), no history of cancer  ORDERING PROVIDER:  April Carroll  TECHNIQUE:  Four views (six images) of the cervical spine  COMPARISON:  None Available  FINDINGS:  Straightening of the normal cervical lordosis with overall anatomic alignment  There is no evidence of an acute fracture  Moderate multilevel disc space narrowing is seen from the C3-4 through C5-6 levels  Odontoid process and lateral masses are unremarkable  Prevertebral soft tissues are within normal limits  Impression: No acute fracture or subluxation  Signed by Suzie Armando MD    Ct Head Without Contrast    Result Date: 8/17/2018  Narrative: INDICATION:  Frontal laceration status post fall with frontal impact  ORDERING PROVIDER:  April Carroll  TECHNIQUE:  CT of the brain was performed without contrast   Automated mA/kV exposure control was utilized and patient examination was performed in strict accordance with principles of ALARA  RADIATION AMOUNT:  913 60 mGy-cm  COMPARISON:  None Available  FINDINGS: There is no acute intracranial hemorrhage, midline shift, mass effect, or extra-axial fluid collection  Gray-white differentiation is preserved  Brain volume and ventricular system are within normal limits for age  Mild patchy areas of low-attenuation are seen in the subcortical and deep periventricular white matter suggesting areas of chronic microvascular ischemia  The skull base and calvarium are intact  The visualized paranasal sinuses are unremarkable  The visualized orbits, globes, and mastoid air cells are unremarkable  Impression: No acute intracranial findings    Signed by Suzie Armando MD     patient seen and evaluated by myself in the emergency department he is alert and oriented x3 clinically not intoxicated at this time he wishes to return home feels well denies any acute complaints advised of rib fractures that were noted on x-ray advised supportive care for this deep breathing and follow up with primary care physician for re-evaluation patient understands this and agrees return precautions and anticipatory guidance discussed  Procedures  [facility]  CritCare Time      Disposition  Final diagnoses:   Fall (on) (from) other stairs and steps, initial encounter   Skin abrasion   Alcohol intoxication ([facility] )   Closed fracture of two ribs of left side, initial encounter - 4th and 5th anterior     Time reflects when diagnosis was documented in both MDM as applicable and the Disposition within this note     Time User Action Codes Description Comment    8/17/2018  8:09 PM Troy Junior Fall (on) (from) other stairs and steps, initial encounter     8/17/2018  8:09 PM Grazyna Sunray Add Harmanttnasim Pancoast  8XXA] Skin abrasion     8/17/2018  8:36 PM Gerardo Garcia Add [F10 929] Alcohol intoxication ([facility] )     8/17/2018  8:46 PM Ramin Diehl Add [S22 42XA] Closed fracture of two ribs of left side, initial encounter     8/17/2018  8:47 PM Hadley Diehl Modify [W10 8XXA] Fall (on) (from) other stairs and steps, initial encounter     8/17/2018  8:47 PM Gerardo Garcia Modify [S22 42XA] Closed fracture of two ribs of left side, initial encounter     8/17/2018  8:47 PM Hadley Diehl Modify [S22 42XA] Closed fracture of two ribs of left side, initial encounter 4th and 5th anterior      ED Disposition     ED Disposition Condition Comment    Discharge  Alecia Mason discharge to home/self care      Condition at discharge: Stable        Follow-up Information     Follow up With Specialties Details Why Contact Luciano Sharma DO Internal Medicine Schedule an appointment as soon as possible for a visit in 2 days  [address] [facility] [address]  [phone]          Discharge Medication List as of 8/17/2018  8:48 PM      CONTINUE these medications which have NOT CHANGED    Details   albuterol (VENTOLIN HFA) 90 mcg/act inhaler Inhale 2 puffs every 4 (four) hours as needed, Starting Mon 1/4/2016, Historical Med aspirin 81 MG tablet Take 1 tablet by mouth daily, Starting Wed 3/26/2014, Historical Med      atorvastatin (LIPITOR) 20 mg tablet Take 1 tablet (20 mg total) by mouth daily at bedtime, Starting Mon 7/2/2018, Normal      carvedilol (COREG) 3 125 mg tablet Take 1 tablet (3 125 mg total) by mouth 2 (two) times a day with meals, Starting Fri 6/1/2018, Normal      citalopram (CeleXA) 40 mg tablet Take 1 tablet (40 mg total) by mouth daily, Starting Fri 6/1/2018, Normal      furosemide (LASIX) 40 mg tablet Take 40 mg by mouth 2 (two) times a day  , Starting Thu 1/18/2018, Historical Med      ipratropium-albuterol (DUO-NEB) 0 5-2 5 mg/3 mL Inhale 2 (two) times a day, Starting Wed 10/11/2017, Historical Med      lisinopril (ZESTRIL) 10 mg tablet Take 10 mg by mouth 2 (two) times a day  , Starting Thu 1/18/2018, Historical Med      metFORMIN (GLUCOPHAGE) 1000 MG tablet Take 1 tablet (1,000 mg total) by mouth 2 (two) times a day with meals, Starting Wed 2/28/2018, Normal      glucose blood (FREESTYLE INSULINX TEST) test strip by In Vitro route, Starting Wed 3/26/2014, Historical Med      nitroglycerin (NITROSTAT) 0 4 mg SL tablet Place under the tongue, Historical Med      sildenafil (REVATIO) 20 mg tablet Take 1 tablet (20 mg total) by mouth 3 (three) times a day, Starting Mon 6/25/2018, Normal      traMADol (ULTRAM) 50 mg tablet Take 1 tablet (50 mg total) by mouth 2 (two) times a day as needed for moderate pain or severe pain, Starting Fri 6/1/2018, Normal           No discharge procedures on file         ED Provider  Electronically Signed by     Joao Foreman DO  08/18/18 8752

## 2018-08-18 NOTE — DISCHARGE INSTRUCTIONS
Alcohol Intoxication   WHAT YOU NEED TO KNOW:   Alcohol intoxication is a harmful physical condition caused when you drink more alcohol than your body can handle  It is also called ethanol poisoning, or being drunk  DISCHARGE INSTRUCTIONS:   Medicine: You may be given medicine to manage the signs and symptoms of alcohol intoxication  Take your medicine as directed  Contact your healthcare provider if you think your medicine is not helping or if you have side effects  Tell him if you are allergic to any medicine  Keep a list of the medicines, vitamins, and herbs you take  Include the amounts, and when and why you take them  Bring the list or the pill bottles to follow-up visits  Keep the list with you in case of emergency  Follow up with your healthcare provider as directed:  Write down your questions so you remember to ask them during your visits  Limit or avoid alcohol:  Men should not have more than 2 drinks per day  Women should not have more than 1 drink per day  A drink is 12 ounces of beer, 5 ounces of wine, or 1½ ounces of liquor  Do not drive or operate machines when you drink alcohol:  Make sure you always have someone to drive you when you drink alcohol  For more information:   · Alcoholics Anonymous  Web Address: http://www agudelo Sur[employer]/  Contact your healthcare provider if:   · You need help to stop drinking alcohol  · You have trouble with work or school because you drink too much alcohol  · You have physical or verbal fights because of alcohol  · You have questions or concerns about your condition or care  Return to the emergency department if:   · You have sudden trouble breathing or chest pain  · You have a seizure  · You feel sad enough to harm yourself or others  · You have hallucinations (you see or hear things that are not real)  · You cannot stop vomiting  · You were in an accident because of alcohol    © 2017 [address] [employer] Information is for End User's use only and may not be sold, redistributed or otherwise used for commercial purposes  All illustrations and images included in CareNotes® are the copyrighted property of A [employer]  or Chad Dumas  The above information is an  only  It is not intended as medical advice for individual conditions or treatments  Talk to your doctor, nurse or pharmacist before following any medical regimen to see if it is safe and effective for you

## 2018-08-20 ENCOUNTER — OFFICE VISIT (OUTPATIENT)
Dept: INTERNAL MEDICINE CLINIC | Facility: CLINIC | Age: 63
End: 2018-08-20
Payer: COMMERCIAL

## 2018-08-20 VITALS
TEMPERATURE: 98.2 F | OXYGEN SATURATION: 96 % | WEIGHT: 173.13 LBS | DIASTOLIC BLOOD PRESSURE: 100 MMHG | HEIGHT: 68 IN | HEART RATE: 92 BPM | BODY MASS INDEX: 26.24 KG/M2 | SYSTOLIC BLOOD PRESSURE: 170 MMHG

## 2018-08-20 DIAGNOSIS — E11.9 TYPE 2 DIABETES MELLITUS WITHOUT COMPLICATION, WITHOUT LONG-TERM CURRENT USE OF INSULIN (HCC): Primary | ICD-10-CM

## 2018-08-20 DIAGNOSIS — N52.8 OTHER MALE ERECTILE DYSFUNCTION: ICD-10-CM
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
## 2018-08-20 DIAGNOSIS — S22.42XD CLOSED FRACTURE OF MULTIPLE RIBS OF LEFT SIDE WITH ROUTINE HEALING: ICD-10-CM

## 2018-08-20 DIAGNOSIS — E78.2 MIXED HYPERLIPIDEMIA: ICD-10-CM
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
## 2018-08-20 LAB — SL AMB POCT HEMOGLOBIN AIC: 7.3

## 2018-08-20 PROCEDURE — 3008F BODY MASS INDEX DOCD: CPT | Performed by: NURSE PRACTITIONER

## 2018-08-20 PROCEDURE — 83036 HEMOGLOBIN GLYCOSYLATED A1C: CPT | Performed by: NURSE PRACTITIONER

## 2018-08-20 PROCEDURE — 36416 COLLJ CAPILLARY BLOOD SPEC: CPT | Performed by: NURSE PRACTITIONER

## 2018-08-20 PROCEDURE — 3045F PR MOST RECENT HEMOGLOBIN A1C LEVEL 7.0-9.0%: CPT | Performed by: NURSE PRACTITIONER

## 2018-08-20 PROCEDURE — 99214 OFFICE O/P EST MOD 30 MIN: CPT | Performed by: NURSE PRACTITIONER

## 2018-08-20 RX ORDER — SILDENAFIL CITRATE 20 MG/1
20 TABLET ORAL 3 TIMES DAILY
Qty: 15 TABLET | Refills: 3 | Status: SHIPPED | OUTPATIENT
Start: 2018-08-20 | End: 2018-12-10 | Stop reason: SDUPTHER

## 2018-08-20 NOTE — PATIENT INSTRUCTIONS
Rib Fracture   AMBULATORY CARE:   A rib fracture  is a crack or break in a rib  A rib fracture can be caused by trauma such as a car accident or fall  Trauma can increase your risk for organ damage when your rib is fractured  Stress fractures in your ribs happen in your upper or middle ribs  Stress fractures can happen when you have a forceful long-term cough  Other things that increase your risk for rib fractures include being an older adult, osteoporosis, and tumors  Common signs and symptoms include:   · Chest wall pain that worsens when you breathe, move, or cough    · Bruising or swelling near your injury    · Shortness of breath or difficulty taking a deep breath  Call 911 for any of the following:   · You have trouble breathing  · You have new or increased pain  Seek care immediately if:   · Your pain does not get better, even after treatment  · You have a fever or a cough  Contact your healthcare provider if:   · You have questions or concerns about your condition or care  Treatment for a rib fracture  may include any of the following:  · Medicines:      ¨ NSAIDs  help decrease swelling and pain  NSAIDs are available without a doctor's order  Ask your healthcare provider which medicine is right for you  Ask how much to take and when to take it  Take as directed  NSAIDs can cause stomach bleeding and kidney problems if not taken correctly  ¨ Prescription pain medicine  may be given  Ask your healthcare provider how to take this medicine safely  Some prescription pain medicines contain acetaminophen  Do not take other medicines that contain acetaminophen without talking to your healthcare provider  Too much acetaminophen may cause liver damage  Prescription pain medicine may cause constipation  Ask your healthcare provider how to prevent or treat constipation  ¨ Intercostal nerve block  may be given to numb the injured area for about 6 hours   It is given as a shot between 2 of your ribs in the fractured area  You may need this if your pain continues or is getting worse even after you take oral pain medicines  · Deep breathing and coughing  will decrease your risk for a lung infection  Hug a pillow on the injured side while doing this exercise, to decrease pain  Take a deep breath and hold it for as long as possible  You should let the air out and then cough strongly  Deep breaths help open your airway  You may be given an incentive spirometer to help take deep breaths  Put the plastic piece in your mouth  Take a slow, deep breath  You should then let the air out and cough  Repeat these steps 10 times every hour  · Rest and limit activity as directed  to decrease swelling and pain, and allow your injury to heal  Avoid activities that may cause more pain or damage to your ribs such as, pulling, pushing, and lifting  As your pain decreases, begin movements slowly  Take short walks between rest periods  · Ice  helps decrease swelling and pain  Ice may also help prevent tissue damage  Use an ice pack or put crushed ice in a plastic bag  Cover it with a towel and place it on your injured area for 15 to 20 minutes every hour as directed  · Surgery  may be needed If many of your ribs are badly fractured  Surgery is often needed for a flail chest  Flail chest occurs if 3 or more of your ribs are broken in 2 or more places  This condition may make it hard for you to breathe  When you take a breath, your rib cage expands  The broken ribs with flail chest do not expand and may push inward on your organs  Broken ribs may need to be held together with plates and screws  An injury to an organ, nerve, or blood vessel may also be treated with surgery  Follow up with your healthcare provider as directed:  Write down your questions so you remember to ask them during your visits    © 2017 [employer] [employer] Information is for End User's use only and may not be sold, redistributed or otherwise used for commercial purposes  All illustrations and images included in CareNotes® are the copyrighted property of [employer]  or Chad Dumas  The above information is an  only  It is not intended as medical advice for individual conditions or treatments  Talk to your doctor, nurse or pharmacist before following any medical regimen to see if it is safe and effective for you

## 2018-08-20 NOTE — PROGRESS NOTES
Assessment/Plan:    No problem-specific Assessment & Plan notes found for this encounter  Diagnoses and all orders for this visit:    Type 2 diabetes mellitus without complication, without long-term current use of insulin ([facility])  Comments:  sugar  133, POCT hgba1c 7 3 today  will f/u in 6 mos   Labwork ordered  Orders:  -     POCT hemoglobin A1c  -     Glucose, fasting; Future  -     Hemoglobin A1C; Future  -     Microalbumin / creatinine urine ratio; Future    Other male erectile dysfunction  Comments:  Testosterone level normal 499 , Revatio reordered  Orders:  -     sildenafil (REVATIO) 20 mg tablet; Take 1 tablet (20 mg total) by mouth 3 (three) times a day    Fall, subsequent encounter  Comments:  Pt seen in [facility] ED for fall X2 was drinking ETOH and fell down flight of wooden steps, fx 2 ribs on left    Closed fracture of multiple ribs of left side with routine healing  Comments:  Pt enc to cont to use ice alternating with heat, take deep breaths and use NSAIDs as directed for rib pain    Mixed hyperlipidemia  Comments:  labwork ordered  Orders:  -     Lipid panel; Future          Subjective:      Patient ID: Wyatt Singh is a 60 y o  male  here for f/u post fall with fx left ribs   Pt reports he had a few beers and shots and fell X2 injuring left ribs, was taken by ambulance to [facility] for evaluation X4 days ago, was diagnosed with 2 let fx ribs  Pt c/o left rib pain 10/10 bp elevated 170/90 due to pain  Labwork reviewed, POCT HgbA1C    Diabetes   He presents for his follow-up diabetic visit  He has type 2 diabetes mellitus  No MedicAlert identification noted  His disease course has been stable  Hypoglycemia symptoms include hunger and sweats  Pertinent negatives for hypoglycemia include no dizziness, headaches or nervousness/anxiousness  Pertinent negatives for diabetes include no chest pain, no fatigue, no polydipsia, no polyphagia, no polyuria, no visual change and no weakness   There are no hypoglycemic complications  Symptoms are stable  Diabetic complications include impotence  Risk factors for coronary artery disease include male sex, dyslipidemia, family history and diabetes mellitus  Current diabetic treatment includes oral agent (dual therapy)  He is compliant with treatment all of the time  His weight is stable  He is following a diabetic and generally healthy diet  He has not had a previous visit with a dietitian  He rarely participates in exercise  There is no change in his home blood glucose trend  An ACE inhibitor/angiotensin II receptor blocker is being taken  He does not see a podiatrist Eye exam is current  Arm Injury    Pertinent negatives include no chest pain  The following portions of the patient's history were reviewed and updated as appropriate:   He  has a past medical history of Anxiety (7/9/2014); Congestive cardiomyopathy ([facility] ); Congestive heart failure (CHF) ([facility] ); COPD exacerbation ([facility] ) (10/11/2017); Depression (1/4/2016); Heartburn; Hypertension (3/26/2014); Myocardial infarction ([facility] ); Pneumonia; Shortness of breath; and Type 2 diabetes mellitus ([facility] ) (3/26/2014)  He   Patient Active Problem List    Diagnosis Date Noted    Fall 08/20/2018    Closed fracture of multiple ribs of left side with routine healing 08/20/2018    Other male erectile dysfunction 06/25/2018    [facility] exacerb[facility] ) 10/11/2017    Acute bronchitis 10/11/2017    Post traumatic stress disorder (PTSD) 08/02/2017    Abnormal TSH 12/07/2016    Allergic urticaria 08/10/2016    Back pain, chronic 08/10/2016    Depression 01/04/2016    Anxiety 07/09/2014    Hyperlipidemia 07/09/2014    Dilated cardiomyopathy ([facility] ) 06/05/2014    Heart failure, systolic, with acute decompensation ([facility] ) 05/01/2014    Hypertension 03/26/2014    Ischemic cardiomyopathy 03/26/2014    Type 2 diabetes mellitus ([facility] ) 03/26/2014     He  has a past surgical history that includes Finger surgery (Left)    His family history includes Arthritis in his mother; Diabetes in his father and sister; Throat cancer in his father  He  reports that he has quit smoking  He has never used smokeless tobacco  He reports that he drinks alcohol  He reports that he does not use drugs  Current Outpatient Prescriptions   Medication Sig Dispense Refill    albuterol (VENTOLIN HFA) 90 mcg/act inhaler Inhale 2 puffs every 4 (four) hours as needed      aspirin 81 MG tablet Take 1 tablet by mouth daily      atorvastatin (LIPITOR) 20 mg tablet Take 1 tablet (20 mg total) by mouth daily at bedtime 90 tablet 0    carvedilol (COREG) 3 125 mg tablet Take 1 tablet (3 125 mg total) by mouth 2 (two) times a day with meals 60 tablet 5    citalopram (CeleXA) 40 mg tablet Take 1 tablet (40 mg total) by mouth daily 30 tablet 5    furosemide (LASIX) 40 mg tablet Take 40 mg by mouth 2 (two) times a day    3    glucose blood (FREESTYLE INSULINX TEST) test strip by In Vitro route      ipratropium-albuterol (DUO-NEB) 0 5-2 5 mg/3 mL Inhale 2 (two) times a day      lisinopril (ZESTRIL) 10 mg tablet Take 10 mg by mouth 2 (two) times a day    3    metFORMIN (GLUCOPHAGE) 1000 MG tablet Take 1 tablet (1,000 mg total) by mouth 2 (two) times a day with meals 90 tablet 3    nitroglycerin (NITROSTAT) 0 4 mg SL tablet Place under the tongue      sildenafil (REVATIO) 20 mg tablet Take 1 tablet (20 mg total) by mouth 3 (three) times a day 15 tablet 3    traMADol (ULTRAM) 50 mg tablet Take 1 tablet (50 mg total) by mouth 2 (two) times a day as needed for moderate pain or severe pain 60 tablet 5     No current facility-administered medications for this visit        Current Outpatient Prescriptions on File Prior to Visit   Medication Sig    albuterol (VENTOLIN HFA) 90 mcg/act inhaler Inhale 2 puffs every 4 (four) hours as needed    aspirin 81 MG tablet Take 1 tablet by mouth daily    atorvastatin (LIPITOR) 20 mg tablet Take 1 tablet (20 mg total) by mouth daily at bedtime    carvedilol (COREG) 3 125 mg tablet Take 1 tablet (3 125 mg total) by mouth 2 (two) times a day with meals    citalopram (CeleXA) 40 mg tablet Take 1 tablet (40 mg total) by mouth daily    furosemide (LASIX) 40 mg tablet Take 40 mg by mouth 2 (two) times a day      glucose blood (FREESTYLE INSULINX TEST) test strip by In Vitro route    ipratropium-albuterol (DUO-NEB) 0 5-2 5 mg/3 mL Inhale 2 (two) times a day    lisinopril (ZESTRIL) 10 mg tablet Take 10 mg by mouth 2 (two) times a day      metFORMIN (GLUCOPHAGE) 1000 MG tablet Take 1 tablet (1,000 mg total) by mouth 2 (two) times a day with meals    nitroglycerin (NITROSTAT) 0 4 mg SL tablet Place under the tongue    traMADol (ULTRAM) 50 mg tablet Take 1 tablet (50 mg total) by mouth 2 (two) times a day as needed for moderate pain or severe pain    [DISCONTINUED] sildenafil (REVATIO) 20 mg tablet Take 1 tablet (20 mg total) by mouth 3 (three) times a day     No current facility-administered medications on file prior to visit  He is allergic to penicillins       Review of Systems   Constitutional: Negative  Negative for appetite change and fatigue  HENT: Negative  Negative for congestion, ear pain, rhinorrhea and sinus pain  Eyes: Negative  Negative for pain, itching and visual disturbance  Respiratory: Negative  Negative for cough, shortness of breath and wheezing  Cardiovascular: Negative  Negative for chest pain, palpitations and leg swelling  Gastrointestinal: Negative  Negative for abdominal pain, constipation, diarrhea, nausea and vomiting  Endocrine: Negative  Negative for polydipsia, polyphagia and polyuria  Genitourinary: Positive for impotence  Negative for difficulty urinating, frequency and urgency  Musculoskeletal: Negative  Negative for back pain and joint swelling  Left rib pain   Skin: Negative  Negative for rash and wound  Allergic/Immunologic: Negative  Neurological: Negative  Negative for dizziness, weakness and headaches  Hematological: Negative  Negative for adenopathy  Does not bruise/bleed easily  Psychiatric/Behavioral: Negative  Negative for behavioral problems and sleep disturbance  The patient is not nervous/anxious  Objective:      /100   Pulse 92   Temp 98 2 °F (36 8 °C)   Ht 5' 8" (1 727 m)   Wt 78 5 kg (173 lb 2 oz)   SpO2 96%   BMI 26 32 kg/m²          Physical Exam   Constitutional: He is oriented to person, place, and time  He appears well-developed and well-nourished  HENT:   Head: Normocephalic  Eyes: Conjunctivae and EOM are normal  Pupils are equal, round, and reactive to light  Neck: Normal range of motion  Neck supple  Cardiovascular: Normal rate and regular rhythm  Pulmonary/Chest: Effort normal and breath sounds normal    Abdominal: Soft  Bowel sounds are normal    Musculoskeletal: Normal range of motion  Right shoulder: He exhibits pain  Arms:  Palpable left rib pain   Neurological: He is alert and oriented to person, place, and time  Skin: Skin is warm and dry  Psychiatric: He has a normal mood and affect   His behavior is normal  Judgment and thought content normal

## 2018-12-07 ENCOUNTER — TELEPHONE (OUTPATIENT)
Dept: OTHER | Facility: OTHER | Age: 63
End: 2018-12-07

## 2018-12-08 NOTE — TELEPHONE ENCOUNTER
Fredrick Garcia 1955  CONFIDENTIALTY NOTICE: This fax transmission is intended only for the addressee  It contains information that is legally privileged,  confidential or otherwise protected from use or disclosure  If you are not the intended recipient, you are strictly prohibited from reviewing,  disclosing, copying using or disseminating any of this information or taking any action in reliance on or regarding this information  If you have  received this fax in error, please notify us immediately by telephone so that we can arrange for its return to us  Page:   Call Id: [id number]  Health Call  Standard Call Report  Health Call  Patient Name: Fredrick Garcia  Gender: Male  : 1955  Age: 58 Y 1 M 3 D  Return Phone  Number: [phone] (Home)  Address: [address] [place] [address],[id number] [place] [address]  City/State/[facility]: [place] [address]  Practice Name: [facility]  Practice Charged:  Physician:  Brian Courtney Name:  Relationship To  Patient: Self  Return Phone Number: [phone] (Home)  Presenting Problem: "I need my medications refilled  Sidenafil 20 mg, Furosemide 40 mg,  Citaloprem 40 mg, Atorvastatin 20  mg "  Service Type: Prescription Refills  Charged Service 1: N/A  Pharmacy Name and  Number: [address] [place], [place] [id number] [phone]  Nurse Assessment  Protocols  Protocol Title Nurse Date/Time  Disp  Time Disposition Final User  2018 8:03:03 PM Send to [place] [id number], [id number] [facility]  2018 8:32:39 PM Close Yes Adam Ricks RN, Antonieta Adhikari  Comments  User: Romelia Boykin RN Date/Time: 2018 8:32:33 PM  He will call the office on Monday for refills  He has enough until then

## 2018-12-10 DIAGNOSIS — N52.8 OTHER MALE ERECTILE DYSFUNCTION: ICD-10-CM

## 2018-12-10 DIAGNOSIS — F32.89 OTHER DEPRESSION: ICD-10-CM

## 2018-12-10 DIAGNOSIS — I10 HYPERTENSION, UNSPECIFIED TYPE: Primary | ICD-10-CM

## 2018-12-10 DIAGNOSIS — E78.2 MIXED HYPERLIPIDEMIA: ICD-10-CM

## 2018-12-10 RX ORDER — FUROSEMIDE 40 MG/1
40 TABLET ORAL 2 TIMES DAILY
Qty: 30 TABLET | Refills: 5 | Status: SHIPPED | OUTPATIENT
Start: 2018-12-10 | End: 2018-12-17 | Stop reason: SDUPTHER

## 2018-12-10 RX ORDER — SILDENAFIL CITRATE 20 MG/1
20 TABLET ORAL 3 TIMES DAILY
Qty: 30 TABLET | Refills: 5 | Status: SHIPPED | OUTPATIENT
Start: 2018-12-10 | End: 2018-12-17 | Stop reason: SDUPTHER

## 2018-12-10 RX ORDER — CITALOPRAM 40 MG/1
40 TABLET ORAL DAILY
Qty: 30 TABLET | Refills: 5 | Status: SHIPPED | OUTPATIENT
Start: 2018-12-10 | End: 2018-12-17 | Stop reason: SDUPTHER

## 2018-12-10 RX ORDER — ATORVASTATIN CALCIUM 20 MG/1
20 TABLET, FILM COATED ORAL
Qty: 30 TABLET | Refills: 5 | Status: SHIPPED | OUTPATIENT
Start: 2018-12-10 | End: 2018-12-17 | Stop reason: SDUPTHER

## 2018-12-17 DIAGNOSIS — F32.89 OTHER DEPRESSION: ICD-10-CM

## 2018-12-17 DIAGNOSIS — E78.2 MIXED HYPERLIPIDEMIA: ICD-10-CM

## 2018-12-17 DIAGNOSIS — I10 HYPERTENSION, UNSPECIFIED TYPE: ICD-10-CM

## 2018-12-17 DIAGNOSIS — N52.8 OTHER MALE ERECTILE DYSFUNCTION: ICD-10-CM

## 2018-12-17 RX ORDER — CITALOPRAM 40 MG/1
40 TABLET ORAL DAILY
Qty: 30 TABLET | Refills: 5 | Status: SHIPPED | OUTPATIENT
Start: 2018-12-17 | End: 2019-05-23 | Stop reason: HOSPADM

## 2018-12-17 RX ORDER — SILDENAFIL CITRATE 20 MG/1
20 TABLET ORAL 3 TIMES DAILY
Qty: 30 TABLET | Refills: 5 | Status: ON HOLD | OUTPATIENT
Start: 2018-12-17 | End: 2019-05-22 | Stop reason: SDUPTHER

## 2018-12-17 RX ORDER — FUROSEMIDE 40 MG/1
40 TABLET ORAL 2 TIMES DAILY
Qty: 60 TABLET | Refills: 5 | Status: ON HOLD | OUTPATIENT
Start: 2018-12-17 | End: 2019-05-22 | Stop reason: SDUPTHER

## 2018-12-17 RX ORDER — ATORVASTATIN CALCIUM 20 MG/1
20 TABLET, FILM COATED ORAL
Qty: 30 TABLET | Refills: 5 | Status: ON HOLD | OUTPATIENT
Start: 2018-12-17 | End: 2019-05-22 | Stop reason: SDUPTHER

## 2019-01-07 RX ORDER — HYDROXYZINE HYDROCHLORIDE 25 MG/1
TABLET, FILM COATED ORAL
Qty: 90 TABLET | Refills: 3 | OUTPATIENT
Start: 2019-01-07

## 2019-01-07 NOTE — TELEPHONE ENCOUNTER
Patient needs to come in to be seen I do not see this on his med list nor do I know what he using it for

## 2019-02-04 ENCOUNTER — OFFICE VISIT (OUTPATIENT)
Dept: INTERNAL MEDICINE CLINIC | Facility: CLINIC | Age: 64
End: 2019-02-04
Payer: COMMERCIAL

## 2019-02-04 VITALS
BODY MASS INDEX: 25.61 KG/M2 | TEMPERATURE: 99.7 F | OXYGEN SATURATION: 97 % | HEART RATE: 79 BPM | WEIGHT: 169 LBS | DIASTOLIC BLOOD PRESSURE: 90 MMHG | SYSTOLIC BLOOD PRESSURE: 150 MMHG | HEIGHT: 68 IN

## 2019-02-04 DIAGNOSIS — I10 ESSENTIAL HYPERTENSION: Primary | ICD-10-CM

## 2019-02-04 DIAGNOSIS — F17.200 SMOKER: ICD-10-CM

## 2019-02-04 DIAGNOSIS — Z12.11 SCREENING FOR COLON CANCER: ICD-10-CM

## 2019-02-04 DIAGNOSIS — I10 ESSENTIAL HYPERTENSION: ICD-10-CM

## 2019-02-04 DIAGNOSIS — Z00.00 MEDICARE ANNUAL WELLNESS VISIT, INITIAL: Primary | ICD-10-CM

## 2019-02-04 DIAGNOSIS — I42.0 DILATED CARDIOMYOPATHY (HCC): ICD-10-CM

## 2019-02-04 DIAGNOSIS — Z91.89 AT RISK FOR BONE DISORDER: ICD-10-CM

## 2019-02-04 DIAGNOSIS — Z12.5 SCREENING FOR PROSTATE CANCER: ICD-10-CM

## 2019-02-04 DIAGNOSIS — Z28.21 INFLUENZA VACCINATION DECLINED: ICD-10-CM

## 2019-02-04 DIAGNOSIS — G47.09 OTHER INSOMNIA: ICD-10-CM

## 2019-02-04 DIAGNOSIS — E78.2 MIXED HYPERLIPIDEMIA: ICD-10-CM

## 2019-02-04 DIAGNOSIS — R53.83 OTHER FATIGUE: ICD-10-CM

## 2019-02-04 DIAGNOSIS — Z13.6 SCREENING FOR AAA (ABDOMINAL AORTIC ANEURYSM): ICD-10-CM

## 2019-02-04 DIAGNOSIS — Z79.4 TYPE 2 DIABETES MELLITUS WITHOUT COMPLICATION, WITH LONG-TERM CURRENT USE OF INSULIN (HCC): ICD-10-CM

## 2019-02-04 DIAGNOSIS — Z11.59 NEED FOR HEPATITIS C SCREENING TEST: ICD-10-CM

## 2019-02-04 DIAGNOSIS — E11.9 TYPE 2 DIABETES MELLITUS WITHOUT COMPLICATION, WITH LONG-TERM CURRENT USE OF INSULIN (HCC): ICD-10-CM

## 2019-02-04 LAB
CREAT UR-MCNC: 191 MG/DL
MICROALBUMIN UR-MCNC: 37.8 MG/L (ref 0–20)
MICROALBUMIN/CREAT 24H UR: 20 MG/G CREATININE (ref 0–30)
SL AMB POCT GLUCOSE BLD: 114
SL AMB POCT HEMOGLOBIN AIC: 6.3 (ref ?–6.5)

## 2019-02-04 PROCEDURE — 83036 HEMOGLOBIN GLYCOSYLATED A1C: CPT | Performed by: NURSE PRACTITIONER

## 2019-02-04 PROCEDURE — 3044F HG A1C LEVEL LT 7.0%: CPT | Performed by: NURSE PRACTITIONER

## 2019-02-04 PROCEDURE — 99214 OFFICE O/P EST MOD 30 MIN: CPT | Performed by: NURSE PRACTITIONER

## 2019-02-04 PROCEDURE — 3008F BODY MASS INDEX DOCD: CPT | Performed by: NURSE PRACTITIONER

## 2019-02-04 PROCEDURE — 82948 REAGENT STRIP/BLOOD GLUCOSE: CPT | Performed by: NURSE PRACTITIONER

## 2019-02-04 PROCEDURE — 3061F NEG MICROALBUMINURIA REV: CPT | Performed by: NURSE PRACTITIONER

## 2019-02-04 PROCEDURE — 4010F ACE/ARB THERAPY RXD/TAKEN: CPT | Performed by: NURSE PRACTITIONER

## 2019-02-04 PROCEDURE — 82570 ASSAY OF URINE CREATININE: CPT | Performed by: NURSE PRACTITIONER

## 2019-02-04 PROCEDURE — G0438 PPPS, INITIAL VISIT: HCPCS | Performed by: NURSE PRACTITIONER

## 2019-02-04 PROCEDURE — 1036F TOBACCO NON-USER: CPT | Performed by: NURSE PRACTITIONER

## 2019-02-04 PROCEDURE — 82043 UR ALBUMIN QUANTITATIVE: CPT | Performed by: NURSE PRACTITIONER

## 2019-02-04 RX ORDER — CARVEDILOL 3.12 MG/1
3.12 TABLET ORAL 2 TIMES DAILY WITH MEALS
Qty: 60 TABLET | Refills: 5 | Status: ON HOLD | OUTPATIENT
Start: 2019-02-04 | End: 2019-05-22 | Stop reason: SDUPTHER

## 2019-02-04 RX ORDER — TRAZODONE HYDROCHLORIDE 100 MG/1
100 TABLET ORAL
Qty: 90 TABLET | Refills: 3 | Status: SHIPPED | OUTPATIENT
Start: 2019-02-04 | End: 2019-03-01 | Stop reason: SDUPTHER

## 2019-02-04 RX ORDER — LISINOPRIL 10 MG/1
TABLET ORAL
Qty: 180 TABLET | Refills: 3 | Status: ON HOLD | OUTPATIENT
Start: 2019-02-04 | End: 2019-05-22 | Stop reason: SDUPTHER

## 2019-02-04 NOTE — PROGRESS NOTES
Assessment and Plan:  Problem List Items Addressed This Visit        Endocrine    Type 2 diabetes mellitus ([facility] [address] )    Relevant Orders    POCT blood glucose (Completed)    POCT hemoglobin A1c (Completed)    Diabetic foot exam    Hemoglobin A1C    Glucose, fasting    Comprehensive metabolic panel    Microalbumin / creatinine urine ratio       Cardiovascular and Mediastinum    Dilated cardiomyopathy ([facility] [address] )    Hypertension       Other    Hyperlipidemia    Relevant Orders    Lipid panel    Other insomnia    Relevant Medications    traZODone (DESYREL) 100 mg tablet      Other Visit Diagnoses     Medicare annual wellness visit, initial    -  Primary    Completed today    At risk for bone disorder        Dexa Scan ordered    Relevant Orders    DXA bone density spine hip and pelvis    Smoker        Pt admits to 6 cigarettes daily     Relevant Orders    DXA bone density spine hip and pelvis    Screening for AAA (abdominal aortic aneurysm)        AAA US ordered    Relevant Orders    US abdominal aorta    Need for hepatitis C screening test        labwork ordered    Relevant Orders    Hepatitis C antibody    Screening for colon cancer        FIT test provided pt declines colonoscopy    Relevant Orders    Occult Blood, Fecal Immunochemical    Screening for prostate cancer        labwork ordered    Relevant Orders    PSA, total and free    Other fatigue        labwork ordered    Relevant Orders    CBC and differential    Influenza vaccination declined            Health Maintenance Due   Topic Date Due    Hepatitis C Screening  1955   [facility] Medicare Annual Wellness Visit (AWV)  1955    DM Eye Exam  11/04/1964    Pneumococcal PPSV23 Medium Risk Adult (1 of 1 - PPSV23) 11/04/1973    CRC Screening: FOBTx3/FIT  11/04/2004    DTaP,Tdap,and Td Vaccines (1 - Tdap) 01/02/2012    Diabetic Foot Exam  08/10/2017    URINE MICROALBUMIN  12/05/2017    INFLUENZA VACCINE  07/01/2018         HPI:  Patient Active Problem List Diagnosis    COPD exacerbation (HCC)    Acute bronchitis    Abnormal TSH    Allergic urticaria    Anxiety    Back pain, chronic    Depression    Dilated cardiomyopathy (HCC)    Heart failure, systolic, with acute decompensation (HCC)    Hyperlipidemia    Hypertension    Ischemic cardiomyopathy    Post traumatic stress disorder (PTSD)    Type 2 diabetes mellitus ([place] [address] )    Other male erectile dysfunction    Fall    Closed fracture of multiple ribs of left side with routine healing    Other insomnia     Past Medical History:   Diagnosis Date    Anxiety 7/9/2014    Congestive cardiomyopathy (HCC)     COPD exacerbation ([place] [address] ) 10/11/2017    Depression 1/4/2016    Heartburn     Hypertension 3/26/2014    Myocardial infarction ([place] [address] )     LAST ASSESSED: 5/7/14    Pneumonia     Shortness of breath     Type 2 diabetes mellitus ([place] [address] ) 3/26/2014     Past Surgical History:   Procedure Laterality Date    FINGER SURGERY Left     SURGERY ON LEFT INDEX FINGER     Family History   Problem Relation Age of Onset    Arthritis Mother     Diabetes Father         MELLITUS    Throat cancer Father         LARYNGEAL    Diabetes Sister         MELLITUS     History   Smoking Status    Former Smoker   Smokeless Tobacco    Never Used     History   Alcohol Use    Yes     Comment: BEING A SOCIAL DRINKER      History   Drug Use No         Current Outpatient Prescriptions   Medication Sig Dispense Refill    albuterol (VENTOLIN HFA) 90 mcg/act inhaler Inhale 2 puffs every 4 (four) hours as needed      aspirin 81 MG tablet Take 1 tablet by mouth daily      atorvastatin (LIPITOR) 20 mg tablet Take 1 tablet (20 mg total) by mouth daily at bedtime 30 tablet 5    carvedilol (COREG) 3 125 mg tablet Take 1 tablet (3 125 mg total) by mouth 2 (two) times a day with meals 60 tablet 5    citalopram (CeleXA) 40 mg tablet Take 1 tablet (40 mg total) by mouth daily 30 tablet 5    furosemide (LASIX) 40 mg tablet Take 1 tablet (40 mg total) by mouth 2 (two) times a day 60 tablet 5    glucose blood (FREESTYLE INSULINX TEST) test strip by In Vitro route      ipratropium-albuterol (DUO-NEB) 0 5-2 5 mg/3 mL Inhale 2 (two) times a day      lisinopril (ZESTRIL) 10 mg tablet Take 10 mg by mouth 2 (two) times a day    3    metFORMIN (GLUCOPHAGE) 1000 MG tablet Take 1 tablet (1,000 mg total) by mouth 2 (two) times a day with meals 90 tablet 3    nitroglycerin (NITROSTAT) 0 4 mg SL tablet Place under the tongue      sildenafil (REVATIO) 20 mg tablet Take 1 tablet (20 mg total) by mouth 3 (three) times a day 30 tablet 5    traMADol (ULTRAM) 50 mg tablet Take 1 tablet (50 mg total) by mouth 2 (two) times a day as needed for moderate pain or severe pain 60 tablet 5    traZODone (DESYREL) 100 mg tablet Take 1 tablet (100 mg total) by mouth daily at bedtime 90 tablet 3     No current facility-administered medications for this visit  Allergies   Allergen Reactions    Penicillins      Immunization History   Administered Date(s) Administered    Influenza Quadrivalent, 6-35 Months IM 10/04/2017    Influenza TIV (IM) 09/10/2014, 11/21/2015, 11/23/2016    TD (adult) Preservative Free 01/01/2012       Patient Care Team:  Brittnee Holloway as PCP - General (Internal Medicine)  MD Negro Hanna CRNP    Medicare Screening Tests and Risk Assessments:  Eveline Royal is here for his Subsequent Wellness visit  Last Medicare Wellness visit information reviewed, patient interviewed, no change since last AWV  Health Risk Assessment:  Patient rates overall health as good  Patient feels that their physical health rating is Same  Eyesight was rated as Same  Hearing was rated as Same  Patient feels that their emotional and mental health rating is Slightly worse  Pain experienced by patient in the last 7 days has been None  Patient states that he has experienced no weight loss or gain in last 6 months       Emotional/Mental Health:  Patient has been feeling nervous/anxious  PHQ-9 Depression Screening:    Frequency of the following problems over the past two weeks:      1  Little interest or pleasure in doing things: 2 - more than half the days      2  Feeling down, depressed, or hopeless: 2 - more than half the days      3  Trouble falling or staying asleep, or sleeping too much: 2 - more than half the days      4  Feeling tired or having little energy: 1 - several days      5  Poor appetite or overeatin - not at all      6  Feeling bad about yourself - or that you are a failure or have let yourself or your family down: 1 - several days      7  Trouble concentrating on things, such as reading the newspaper or watching television: 1 - several days      8  Moving or speaking so slowly that other people could have noticed  Or the opposite - being so fidgety or restless that you have been moving around a lot more than usual: 0 - not at all      9  Thoughts that you would be better off dead, or of hurting yourself in some way: 0 - not at all  PHQ-2 Score: 4  PHQ-9 Score: 9    Broken Bones/Falls: Fall Risk Assessment:    In the past year, patient has experienced: No history of falling in past year          Bladder/Bowel:  Patient has not leaked urine accidently in the last six months  Patient reports no loss of bowel control  Immunizations:  Patient has not had a flu vaccination within the last year  Patient has not received a pneumonia shot  Patient has not received a shingles shot  Patient has not received tetanus/diphtheria shot  Home Safety:  Patient does not have trouble with stairs inside or outside of their home  Patient currently reports that there are no safety hazards present in home, working smoke alarms, working carbon monoxide detectors        Preventative Screenings:   no prostate cancer screen performed, no colon cancer screen completed, no cholesterol screen completed, no glaucoma eye exam completed    Nutrition:  Current diet: Regular with servings of the following:    Medications:  Patient is not currently taking any over-the-counter supplements  Patient is able to manage medications  Lifestyle Choices:  Patient reports current tobacco use  Patient reports alcohol use  Alcohol use per week: no vehicle at this time  Patient drives a vehicle  Patient wears seat belt  Current level of exercise of physical activity described by patient as: daily  Activities of Daily Living:  Can get out of bed by his or her self, able to dress self, able to make own meals, able to do own shopping, able to bathe self, can do own laundry/housekeeping, can manage own money, pay bills and track expenses    Previous Hospitalizations:  No hospitalization or ED visit in past 12 months        Advanced Directives:  Patient has decided on a power of   Patient has spoken to designated power of   Patient has not completed advanced directive  Preventative Screening/Counseling:      Cardiovascular:      General: Screening Current      Counseling: Healthy Diet and Tobacco Cessation     Due for Labs/Analytes/Optional EKG: Lipid Panel, Cholesterol, Lipoprotein and Triglycerides      Comments: Pt is not interested in quitting smoking todday        Diabetes:      General: Risks and Benefits Discussed          Colorectal Cancer:      General: Risks and Benefits Discussed      Due for studies: Fecal Occult Blood          Prostate Cancer:      Due for labs: PSA          Osteoporosis:      Due for studies: Bone Density Ultrasound          AAA:  Patient has history of tobacco use        Counseling: tobacco cessation counseling given      Study: Screening US          Glaucoma:      General: Risks and Benefits Discussed      Comments: Pt knows that he needs an exam but cost is a factor        HIV:      General: Patient Declines          Hepatitis C:      General: Risks and Benefits Discussed        Advanced Directives:   Patient has no living will for healthcare, does not have durable POA for healthcare, patient does not have an advanced directive  Information on ACP and/or AD provided  5 wishes given  End of life assessment reviewed with patient  Provider agrees with end of life decisions   Immunizations:      Influenza: Patient Declines      Pneumococcal: Risks & Benefits Discussed      Shingrix: Risks & Benefits Discussed      Hepatitis B (Low risk patients): Prevention Counseling      Hepatitis B (Medium to high risk patients): Patient Declines      Zostavax: Patient Declines      TD: Patient Declines      TDAP: Patient Declines            No exam data present    Physical Exam:  Review of Systems   Constitutional: Negative  HENT: Negative  Eyes: Negative  Respiratory: Negative  Cardiovascular: Negative  Gastrointestinal: Negative  Negative for bowel incontinence  Endocrine: Negative  Genitourinary: Negative  Musculoskeletal: Negative  Skin: Negative  Allergic/Immunologic: Negative  Neurological: Negative  Hematological: Negative  Psychiatric/Behavioral: The patient is nervous/anxious  Vitals:    02/04/19 1415   BP: 150/90   Pulse: 79   Temp: 99 7 °F (37 6 °C)   SpO2: 97%   Weight: 76 7 kg (169 lb)   Height: 5' 8" (1 727 m)   Body mass index is 25 7 kg/m²  Physical Exam   Constitutional: He is oriented to person, place, and time  He appears well-developed and well-nourished  HENT:   Head: Normocephalic  Eyes: EOM are normal    Neck: Normal range of motion  Neck supple  No thyromegaly present  Cardiovascular: Normal rate  Pulses are no weak pulses  Pulses:       Dorsalis pedis pulses are 2+ on the right side, and 2+ on the left side  Posterior tibial pulses are 2+ on the right side, and 2+ on the left side  Pulmonary/Chest: Effort normal and breath sounds normal    Abdominal: Soft   Bowel sounds are normal  Musculoskeletal:        Right hip: He exhibits decreased range of motion and tenderness  Left hip: He exhibits decreased range of motion and tenderness  Lumbar back: He exhibits tenderness and pain  Feet:   Right Foot:   Skin Integrity: Negative for ulcer, skin breakdown, erythema, warmth, callus or dry skin  Left Foot:   Skin Integrity: Negative for ulcer, skin breakdown, erythema, warmth, callus or dry skin  Neurological: He is alert and oriented to person, place, and time  Skin: Skin is warm and dry  Patient's shoes and socks removed  Right Foot/Ankle   Right Foot Inspection  Skin Exam: skin normal and skin intact no dry skin, no warmth, no callus, no erythema, no maceration, no abnormal color, no pre-ulcer, no ulcer and no callus                          Toe Exam: ROM and strength within normal limits  Sensory   Vibration: intact  Proprioception: intact   Monofilament testing: intact  Vascular  Capillary refills: < 3 seconds  The right DP pulse is 2+  The right PT pulse is 2+  Left Foot/Ankle  Left Foot Inspection  Skin Exam: skin normal and skin intactno dry skin, no warmth, no erythema, no maceration, normal color, no pre-ulcer, no ulcer and no callus                         Toe Exam: ROM and strength within normal limits                   Sensory   Vibration: intact  Proprioception: intact  Monofilament: intact  Vascular  Capillary refills: < 3 seconds  The left DP pulse is 2+  The left PT pulse is 2+  Assign Risk Category:  No deformity present; No loss of protective sensation;  No weak pulses       Risk: 0

## 2019-02-05 DIAGNOSIS — Z11.59 NEED FOR HEPATITIS C SCREENING TEST: Primary | ICD-10-CM

## 2019-03-01 DIAGNOSIS — G47.09 OTHER INSOMNIA: ICD-10-CM

## 2019-03-01 DIAGNOSIS — G89.29 OTHER CHRONIC PAIN: ICD-10-CM

## 2019-03-04 RX ORDER — TRAMADOL HYDROCHLORIDE 50 MG/1
50 TABLET ORAL 2 TIMES DAILY PRN
Qty: 60 TABLET | Refills: 5 | Status: ON HOLD | OUTPATIENT
Start: 2019-03-04 | End: 2019-05-22 | Stop reason: SDUPTHER

## 2019-03-04 RX ORDER — TRAZODONE HYDROCHLORIDE 100 MG/1
100 TABLET ORAL
Qty: 90 TABLET | Refills: 3 | Status: ON HOLD | OUTPATIENT
Start: 2019-03-04 | End: 2019-05-23 | Stop reason: SDUPTHER

## 2019-05-15 DIAGNOSIS — E11.9 DIABETES MELLITUS TYPE 2 IN NONOBESE (HCC): ICD-10-CM

## 2019-05-17 ENCOUNTER — APPOINTMENT (EMERGENCY)
Dept: RADIOLOGY | Facility: HOSPITAL | Age: 64
DRG: 191 | End: 2019-05-17
Payer: COMMERCIAL

## 2019-05-17 ENCOUNTER — HOSPITAL ENCOUNTER (INPATIENT)
Facility: HOSPITAL | Age: 64
LOS: 2 days | DRG: 191 | End: 2019-05-20
Attending: EMERGENCY MEDICINE | Admitting: INTERNAL MEDICINE
Payer: COMMERCIAL

## 2019-05-17 DIAGNOSIS — E78.2 MIXED HYPERLIPIDEMIA: ICD-10-CM

## 2019-05-17 DIAGNOSIS — E11.9 TYPE 2 DIABETES MELLITUS WITHOUT COMPLICATION, WITHOUT LONG-TERM CURRENT USE OF INSULIN (HCC): ICD-10-CM

## 2019-05-17 DIAGNOSIS — J40 BRONCHITIS: ICD-10-CM

## 2019-05-17 DIAGNOSIS — J44.1 COPD EXACERBATION (HCC): ICD-10-CM

## 2019-05-17 DIAGNOSIS — R45.851 SUICIDAL IDEATION: ICD-10-CM

## 2019-05-17 DIAGNOSIS — R07.9 CHEST PAIN: Primary | ICD-10-CM

## 2019-05-17 DIAGNOSIS — I10 HYPERTENSION: ICD-10-CM

## 2019-05-17 DIAGNOSIS — F32.A DEPRESSION: ICD-10-CM

## 2019-05-17 DIAGNOSIS — D72.829 LEUKOCYTOSIS: ICD-10-CM

## 2019-05-17 DIAGNOSIS — I42.0 DILATED CARDIOMYOPATHY (HCC): ICD-10-CM

## 2019-05-17 LAB
ALBUMIN SERPL BCP-MCNC: 4 G/DL (ref 3.5–5.7)
ALP SERPL-CCNC: 41 U/L (ref 55–165)
ALT SERPL W P-5'-P-CCNC: 6 U/L (ref 7–52)
AMPHETAMINES SERPL QL SCN: NEGATIVE
ANION GAP SERPL CALCULATED.3IONS-SCNC: 10 MMOL/L (ref 4–13)
APTT PPP: 33 SECONDS (ref 26–38)
AST SERPL W P-5'-P-CCNC: 10 U/L (ref 13–39)
BARBITURATES UR QL: NEGATIVE
BASOPHILS # BLD AUTO: 0.1 THOUSANDS/ΜL (ref 0–0.1)
BASOPHILS NFR BLD AUTO: 1 % (ref 0–2)
BENZODIAZ UR QL: NEGATIVE
BILIRUB SERPL-MCNC: 0.6 MG/DL (ref 0.2–1)
BNP SERPL-MCNC: 65 PG/ML (ref 1–100)
BUN SERPL-MCNC: 14 MG/DL (ref 7–25)
CALCIUM SERPL-MCNC: 9.7 MG/DL (ref 8.6–10.5)
CHLORIDE SERPL-SCNC: 100 MMOL/L (ref 98–107)
CO2 SERPL-SCNC: 27 MMOL/L (ref 21–31)
COCAINE UR QL: NEGATIVE
CREAT SERPL-MCNC: 0.89 MG/DL (ref 0.7–1.3)
EOSINOPHIL # BLD AUTO: 0.2 THOUSAND/ΜL (ref 0–0.61)
EOSINOPHIL NFR BLD AUTO: 1 % (ref 0–5)
ERYTHROCYTE [DISTWIDTH] IN BLOOD BY AUTOMATED COUNT: 13.5 % (ref 11.5–14.5)
ETHANOL SERPL-MCNC: <10 MG/DL
GFR SERPL CREATININE-BSD FRML MDRD: 90 ML/MIN/1.73SQ M
GLUCOSE SERPL-MCNC: 140 MG/DL (ref 65–99)
GLUCOSE SERPL-MCNC: 180 MG/DL (ref 65–140)
HCT VFR BLD AUTO: 42.6 % (ref 42–47)
HGB BLD-MCNC: 14.5 G/DL (ref 14–18)
INR PPP: 0.99 (ref 0.9–1.5)
LYMPHOCYTES # BLD AUTO: 1.5 THOUSANDS/ΜL (ref 0.6–4.47)
LYMPHOCYTES NFR BLD AUTO: 10 % (ref 21–51)
MCH RBC QN AUTO: 31.5 PG (ref 26–34)
MCHC RBC AUTO-ENTMCNC: 34 G/DL (ref 31–37)
MCV RBC AUTO: 93 FL (ref 81–99)
METHADONE UR QL: NEGATIVE
MONOCYTES # BLD AUTO: 1.2 THOUSAND/ΜL (ref 0.17–1.22)
MONOCYTES NFR BLD AUTO: 8 % (ref 2–12)
NEUTROPHILS # BLD AUTO: 12.3 THOUSANDS/ΜL (ref 1.4–6.5)
NEUTS SEG NFR BLD AUTO: 81 % (ref 42–75)
OPIATES UR QL SCN: NEGATIVE
PCP UR QL: NEGATIVE
PLATELET # BLD AUTO: 274 THOUSANDS/UL (ref 149–390)
PMV BLD AUTO: 7.7 FL (ref 8.6–11.7)
POTASSIUM SERPL-SCNC: 3.5 MMOL/L (ref 3.5–5.5)
PROCALCITONIN SERPL-MCNC: 0.08 NG/ML
PROT SERPL-MCNC: 7.2 G/DL (ref 6.4–8.9)
PROTHROMBIN TIME: 11.5 SECONDS (ref 10.2–13)
RBC # BLD AUTO: 4.59 MILLION/UL (ref 4.3–5.9)
SODIUM SERPL-SCNC: 137 MMOL/L (ref 134–143)
THC UR QL: NEGATIVE
TROPONIN I SERPL-MCNC: <0.03 NG/ML
WBC # BLD AUTO: 15.2 THOUSAND/UL (ref 4.8–10.8)

## 2019-05-17 PROCEDURE — 94640 AIRWAY INHALATION TREATMENT: CPT

## 2019-05-17 PROCEDURE — 85730 THROMBOPLASTIN TIME PARTIAL: CPT | Performed by: EMERGENCY MEDICINE

## 2019-05-17 PROCEDURE — 84484 ASSAY OF TROPONIN QUANT: CPT | Performed by: EMERGENCY MEDICINE

## 2019-05-17 PROCEDURE — 94760 N-INVAS EAR/PLS OXIMETRY 1: CPT

## 2019-05-17 PROCEDURE — 85610 PROTHROMBIN TIME: CPT | Performed by: EMERGENCY MEDICINE

## 2019-05-17 PROCEDURE — 82948 REAGENT STRIP/BLOOD GLUCOSE: CPT

## 2019-05-17 PROCEDURE — 80053 COMPREHEN METABOLIC PANEL: CPT | Performed by: EMERGENCY MEDICINE

## 2019-05-17 PROCEDURE — 85025 COMPLETE CBC W/AUTO DIFF WBC: CPT | Performed by: EMERGENCY MEDICINE

## 2019-05-17 PROCEDURE — 87185 SC STD ENZYME DETCJ PER NZM: CPT | Performed by: INTERNAL MEDICINE

## 2019-05-17 PROCEDURE — 87184 SC STD DISK METHOD PER PLATE: CPT | Performed by: INTERNAL MEDICINE

## 2019-05-17 PROCEDURE — 36415 COLL VENOUS BLD VENIPUNCTURE: CPT | Performed by: EMERGENCY MEDICINE

## 2019-05-17 PROCEDURE — 80307 DRUG TEST PRSMV CHEM ANLYZR: CPT | Performed by: INTERNAL MEDICINE

## 2019-05-17 PROCEDURE — 99285 EMERGENCY DEPT VISIT HI MDM: CPT

## 2019-05-17 PROCEDURE — 87070 CULTURE OTHR SPECIMN AEROBIC: CPT | Performed by: INTERNAL MEDICINE

## 2019-05-17 PROCEDURE — 84145 PROCALCITONIN (PCT): CPT | Performed by: INTERNAL MEDICINE

## 2019-05-17 PROCEDURE — 99219 PR INITIAL OBSERVATION CARE/DAY 50 MINUTES: CPT | Performed by: INTERNAL MEDICINE

## 2019-05-17 PROCEDURE — 80320 DRUG SCREEN QUANTALCOHOLS: CPT | Performed by: EMERGENCY MEDICINE

## 2019-05-17 PROCEDURE — 83880 ASSAY OF NATRIURETIC PEPTIDE: CPT | Performed by: EMERGENCY MEDICINE

## 2019-05-17 PROCEDURE — 93005 ELECTROCARDIOGRAM TRACING: CPT

## 2019-05-17 PROCEDURE — 71045 X-RAY EXAM CHEST 1 VIEW: CPT

## 2019-05-17 PROCEDURE — 87077 CULTURE AEROBIC IDENTIFY: CPT | Performed by: INTERNAL MEDICINE

## 2019-05-17 PROCEDURE — 87205 SMEAR GRAM STAIN: CPT | Performed by: INTERNAL MEDICINE

## 2019-05-17 PROCEDURE — 84484 ASSAY OF TROPONIN QUANT: CPT | Performed by: INTERNAL MEDICINE

## 2019-05-17 RX ORDER — PREDNISONE 20 MG/1
40 TABLET ORAL DAILY
Status: DISCONTINUED | OUTPATIENT
Start: 2019-05-18 | End: 2019-05-19

## 2019-05-17 RX ORDER — CITALOPRAM 20 MG/1
40 TABLET ORAL DAILY
Status: DISCONTINUED | OUTPATIENT
Start: 2019-05-18 | End: 2019-05-20 | Stop reason: HOSPADM

## 2019-05-17 RX ORDER — NITROGLYCERIN 0.4 MG/1
0.4 TABLET SUBLINGUAL ONCE
Status: COMPLETED | OUTPATIENT
Start: 2019-05-17 | End: 2019-05-17

## 2019-05-17 RX ORDER — FUROSEMIDE 40 MG/1
40 TABLET ORAL 2 TIMES DAILY
Status: DISCONTINUED | OUTPATIENT
Start: 2019-05-17 | End: 2019-05-20 | Stop reason: HOSPADM

## 2019-05-17 RX ORDER — ASPIRIN 81 MG/1
81 TABLET, CHEWABLE ORAL DAILY
Status: DISCONTINUED | OUTPATIENT
Start: 2019-05-18 | End: 2019-05-20 | Stop reason: HOSPADM

## 2019-05-17 RX ORDER — LISINOPRIL 10 MG/1
10 TABLET ORAL 2 TIMES DAILY
Status: DISCONTINUED | OUTPATIENT
Start: 2019-05-17 | End: 2019-05-20 | Stop reason: HOSPADM

## 2019-05-17 RX ORDER — TRAMADOL HYDROCHLORIDE 50 MG/1
50 TABLET ORAL 2 TIMES DAILY PRN
Status: DISCONTINUED | OUTPATIENT
Start: 2019-05-17 | End: 2019-05-20 | Stop reason: HOSPADM

## 2019-05-17 RX ORDER — TRAZODONE HYDROCHLORIDE 50 MG/1
100 TABLET ORAL
Status: DISCONTINUED | OUTPATIENT
Start: 2019-05-17 | End: 2019-05-20 | Stop reason: HOSPADM

## 2019-05-17 RX ORDER — INSULIN GLARGINE 100 [IU]/ML
5 INJECTION, SOLUTION SUBCUTANEOUS
Status: DISCONTINUED | OUTPATIENT
Start: 2019-05-17 | End: 2019-05-19

## 2019-05-17 RX ORDER — ATORVASTATIN CALCIUM 20 MG/1
20 TABLET, FILM COATED ORAL
Status: DISCONTINUED | OUTPATIENT
Start: 2019-05-17 | End: 2019-05-20 | Stop reason: HOSPADM

## 2019-05-17 RX ORDER — CARVEDILOL 3.12 MG/1
3.12 TABLET ORAL 2 TIMES DAILY WITH MEALS
Status: DISCONTINUED | OUTPATIENT
Start: 2019-05-17 | End: 2019-05-20 | Stop reason: HOSPADM

## 2019-05-17 RX ORDER — IPRATROPIUM BROMIDE AND ALBUTEROL SULFATE 2.5; .5 MG/3ML; MG/3ML
3 SOLUTION RESPIRATORY (INHALATION)
Status: DISCONTINUED | OUTPATIENT
Start: 2019-05-17 | End: 2019-05-17

## 2019-05-17 RX ORDER — ACETAMINOPHEN 325 MG/1
975 TABLET ORAL ONCE
Status: COMPLETED | OUTPATIENT
Start: 2019-05-17 | End: 2019-05-17

## 2019-05-17 RX ORDER — IPRATROPIUM BROMIDE AND ALBUTEROL SULFATE 2.5; .5 MG/3ML; MG/3ML
3 SOLUTION RESPIRATORY (INHALATION)
Status: DISCONTINUED | OUTPATIENT
Start: 2019-05-17 | End: 2019-05-20 | Stop reason: HOSPADM

## 2019-05-17 RX ADMIN — CARVEDILOL 3.12 MG: 3.12 TABLET, FILM COATED ORAL at 18:04

## 2019-05-17 RX ADMIN — LISINOPRIL 10 MG: 10 TABLET ORAL at 18:04

## 2019-05-17 RX ADMIN — NITROGLYCERIN 1 INCH: 20 OINTMENT TOPICAL at 14:56

## 2019-05-17 RX ADMIN — INSULIN LISPRO 1 UNITS: 100 INJECTION, SOLUTION INTRAVENOUS; SUBCUTANEOUS at 21:59

## 2019-05-17 RX ADMIN — TRAMADOL HYDROCHLORIDE 50 MG: 50 TABLET, COATED ORAL at 20:35

## 2019-05-17 RX ADMIN — NITROGLYCERIN 0.4 MG: 0.4 TABLET SUBLINGUAL at 14:56

## 2019-05-17 RX ADMIN — INSULIN GLARGINE 5 UNITS: 100 INJECTION, SOLUTION SUBCUTANEOUS at 21:58

## 2019-05-17 RX ADMIN — TRAZODONE HYDROCHLORIDE 100 MG: 50 TABLET ORAL at 21:58

## 2019-05-17 RX ADMIN — ATORVASTATIN CALCIUM 20 MG: 20 TABLET, FILM COATED ORAL at 21:58

## 2019-05-17 RX ADMIN — IPRATROPIUM BROMIDE AND ALBUTEROL SULFATE 3 ML: 2.5; .5 SOLUTION RESPIRATORY (INHALATION) at 20:14

## 2019-05-17 RX ADMIN — ACETAMINOPHEN 975 MG: 325 TABLET ORAL at 15:03

## 2019-05-18 LAB
ATRIAL RATE: 86 BPM
GLUCOSE SERPL-MCNC: 137 MG/DL (ref 65–140)
GLUCOSE SERPL-MCNC: 236 MG/DL (ref 65–140)
GLUCOSE SERPL-MCNC: 268 MG/DL (ref 65–140)
GLUCOSE SERPL-MCNC: 277 MG/DL (ref 65–140)
P AXIS: 73 DEGREES
PR INTERVAL: 176 MS
PROCALCITONIN SERPL-MCNC: 0.08 NG/ML
QRS AXIS: -23 DEGREES
QRSD INTERVAL: 110 MS
QT INTERVAL: 410 MS
QTC INTERVAL: 490 MS
T WAVE AXIS: 79 DEGREES
VENTRICULAR RATE: 86 BPM

## 2019-05-18 PROCEDURE — 94760 N-INVAS EAR/PLS OXIMETRY 1: CPT

## 2019-05-18 PROCEDURE — 84145 PROCALCITONIN (PCT): CPT | Performed by: INTERNAL MEDICINE

## 2019-05-18 PROCEDURE — 99232 SBSQ HOSP IP/OBS MODERATE 35: CPT | Performed by: INTERNAL MEDICINE

## 2019-05-18 PROCEDURE — 99222 1ST HOSP IP/OBS MODERATE 55: CPT | Performed by: PHYSICIAN ASSISTANT

## 2019-05-18 PROCEDURE — 82948 REAGENT STRIP/BLOOD GLUCOSE: CPT

## 2019-05-18 PROCEDURE — 93010 ELECTROCARDIOGRAM REPORT: CPT | Performed by: INTERNAL MEDICINE

## 2019-05-18 PROCEDURE — 94640 AIRWAY INHALATION TREATMENT: CPT

## 2019-05-18 PROCEDURE — 99222 1ST HOSP IP/OBS MODERATE 55: CPT | Performed by: PSYCHIATRY & NEUROLOGY

## 2019-05-18 RX ORDER — PANTOPRAZOLE SODIUM 40 MG/1
40 TABLET, DELAYED RELEASE ORAL
Status: DISCONTINUED | OUTPATIENT
Start: 2019-05-18 | End: 2019-05-20 | Stop reason: HOSPADM

## 2019-05-18 RX ADMIN — CITALOPRAM HYDROBROMIDE 40 MG: 20 TABLET ORAL at 08:16

## 2019-05-18 RX ADMIN — CARVEDILOL 3.12 MG: 3.12 TABLET, FILM COATED ORAL at 17:12

## 2019-05-18 RX ADMIN — CARVEDILOL 3.12 MG: 3.12 TABLET, FILM COATED ORAL at 08:16

## 2019-05-18 RX ADMIN — LISINOPRIL 10 MG: 10 TABLET ORAL at 17:12

## 2019-05-18 RX ADMIN — INSULIN LISPRO 4 UNITS: 100 INJECTION, SOLUTION INTRAVENOUS; SUBCUTANEOUS at 17:12

## 2019-05-18 RX ADMIN — FUROSEMIDE 40 MG: 40 TABLET ORAL at 08:16

## 2019-05-18 RX ADMIN — TRAZODONE HYDROCHLORIDE 100 MG: 50 TABLET ORAL at 22:48

## 2019-05-18 RX ADMIN — PANTOPRAZOLE SODIUM 40 MG: 40 TABLET, DELAYED RELEASE ORAL at 08:16

## 2019-05-18 RX ADMIN — INSULIN GLARGINE 5 UNITS: 100 INJECTION, SOLUTION SUBCUTANEOUS at 22:48

## 2019-05-18 RX ADMIN — TRAMADOL HYDROCHLORIDE 50 MG: 50 TABLET, COATED ORAL at 08:57

## 2019-05-18 RX ADMIN — INSULIN LISPRO 3 UNITS: 100 INJECTION, SOLUTION INTRAVENOUS; SUBCUTANEOUS at 11:42

## 2019-05-18 RX ADMIN — ASPIRIN 81 MG 81 MG: 81 TABLET ORAL at 08:16

## 2019-05-18 RX ADMIN — INSULIN LISPRO 2 UNITS: 100 INJECTION, SOLUTION INTRAVENOUS; SUBCUTANEOUS at 22:48

## 2019-05-18 RX ADMIN — ATORVASTATIN CALCIUM 20 MG: 20 TABLET, FILM COATED ORAL at 22:48

## 2019-05-18 RX ADMIN — IPRATROPIUM BROMIDE AND ALBUTEROL SULFATE 3 ML: 2.5; .5 SOLUTION RESPIRATORY (INHALATION) at 13:06

## 2019-05-18 RX ADMIN — FUROSEMIDE 40 MG: 40 TABLET ORAL at 17:12

## 2019-05-18 RX ADMIN — IPRATROPIUM BROMIDE AND ALBUTEROL SULFATE 3 ML: 2.5; .5 SOLUTION RESPIRATORY (INHALATION) at 07:01

## 2019-05-18 RX ADMIN — IPRATROPIUM BROMIDE AND ALBUTEROL SULFATE 3 ML: 2.5; .5 SOLUTION RESPIRATORY (INHALATION) at 19:51

## 2019-05-18 RX ADMIN — PREDNISONE 40 MG: 20 TABLET ORAL at 08:16

## 2019-05-18 RX ADMIN — ENOXAPARIN SODIUM 40 MG: 40 INJECTION SUBCUTANEOUS at 08:15

## 2019-05-18 RX ADMIN — LISINOPRIL 10 MG: 10 TABLET ORAL at 08:15

## 2019-05-19 PROBLEM — E87.6 HYPOKALEMIA: Status: ACTIVE | Noted: 2019-05-19

## 2019-05-19 LAB
ANION GAP SERPL CALCULATED.3IONS-SCNC: 7 MMOL/L (ref 4–13)
BUN SERPL-MCNC: 18 MG/DL (ref 7–25)
CALCIUM SERPL-MCNC: 9.1 MG/DL (ref 8.6–10.5)
CHLORIDE SERPL-SCNC: 102 MMOL/L (ref 98–107)
CO2 SERPL-SCNC: 27 MMOL/L (ref 21–31)
CREAT SERPL-MCNC: 0.92 MG/DL (ref 0.7–1.3)
ERYTHROCYTE [DISTWIDTH] IN BLOOD BY AUTOMATED COUNT: 13.4 % (ref 11.5–14.5)
GFR SERPL CREATININE-BSD FRML MDRD: 88 ML/MIN/1.73SQ M
GLUCOSE SERPL-MCNC: 201 MG/DL (ref 65–140)
GLUCOSE SERPL-MCNC: 213 MG/DL (ref 65–140)
GLUCOSE SERPL-MCNC: 249 MG/DL (ref 65–99)
GLUCOSE SERPL-MCNC: 276 MG/DL (ref 65–140)
GLUCOSE SERPL-MCNC: 350 MG/DL (ref 65–140)
HCT VFR BLD AUTO: 36.9 % (ref 42–47)
HGB BLD-MCNC: 12.6 G/DL (ref 14–18)
MCH RBC QN AUTO: 31.4 PG (ref 26–34)
MCHC RBC AUTO-ENTMCNC: 34.2 G/DL (ref 31–37)
MCV RBC AUTO: 92 FL (ref 81–99)
PLATELET # BLD AUTO: 265 THOUSANDS/UL (ref 149–390)
PMV BLD AUTO: 8.1 FL (ref 8.6–11.7)
POTASSIUM SERPL-SCNC: 3 MMOL/L (ref 3.5–5.5)
RBC # BLD AUTO: 4.02 MILLION/UL (ref 4.3–5.9)
SODIUM SERPL-SCNC: 136 MMOL/L (ref 134–143)
WBC # BLD AUTO: 15.3 THOUSAND/UL (ref 4.8–10.8)

## 2019-05-19 PROCEDURE — 82948 REAGENT STRIP/BLOOD GLUCOSE: CPT

## 2019-05-19 PROCEDURE — 80048 BASIC METABOLIC PNL TOTAL CA: CPT | Performed by: INTERNAL MEDICINE

## 2019-05-19 PROCEDURE — 99232 SBSQ HOSP IP/OBS MODERATE 35: CPT | Performed by: INTERNAL MEDICINE

## 2019-05-19 PROCEDURE — 94760 N-INVAS EAR/PLS OXIMETRY 1: CPT

## 2019-05-19 PROCEDURE — 85027 COMPLETE CBC AUTOMATED: CPT | Performed by: INTERNAL MEDICINE

## 2019-05-19 PROCEDURE — 94640 AIRWAY INHALATION TREATMENT: CPT

## 2019-05-19 RX ORDER — INSULIN GLARGINE 100 [IU]/ML
5 INJECTION, SOLUTION SUBCUTANEOUS ONCE
Status: COMPLETED | OUTPATIENT
Start: 2019-05-19 | End: 2019-05-19

## 2019-05-19 RX ORDER — INSULIN GLARGINE 100 [IU]/ML
10 INJECTION, SOLUTION SUBCUTANEOUS
Status: DISCONTINUED | OUTPATIENT
Start: 2019-05-19 | End: 2019-05-20 | Stop reason: HOSPADM

## 2019-05-19 RX ORDER — POTASSIUM CHLORIDE 20 MEQ/1
40 TABLET, EXTENDED RELEASE ORAL ONCE
Status: COMPLETED | OUTPATIENT
Start: 2019-05-19 | End: 2019-05-19

## 2019-05-19 RX ORDER — METHYLPREDNISOLONE SODIUM SUCCINATE 40 MG/ML
40 INJECTION, POWDER, LYOPHILIZED, FOR SOLUTION INTRAMUSCULAR; INTRAVENOUS EVERY 12 HOURS SCHEDULED
Status: DISCONTINUED | OUTPATIENT
Start: 2019-05-19 | End: 2019-05-20 | Stop reason: HOSPADM

## 2019-05-19 RX ORDER — CLONIDINE HYDROCHLORIDE 0.1 MG/1
0.1 TABLET ORAL
Status: DISCONTINUED | OUTPATIENT
Start: 2019-05-19 | End: 2019-05-20 | Stop reason: HOSPADM

## 2019-05-19 RX ADMIN — IPRATROPIUM BROMIDE AND ALBUTEROL SULFATE 3 ML: 2.5; .5 SOLUTION RESPIRATORY (INHALATION) at 13:00

## 2019-05-19 RX ADMIN — POTASSIUM CHLORIDE 40 MEQ: 1500 TABLET, EXTENDED RELEASE ORAL at 09:06

## 2019-05-19 RX ADMIN — TRAMADOL HYDROCHLORIDE 50 MG: 50 TABLET, COATED ORAL at 09:08

## 2019-05-19 RX ADMIN — TRAZODONE HYDROCHLORIDE 100 MG: 50 TABLET ORAL at 21:00

## 2019-05-19 RX ADMIN — CARVEDILOL 3.12 MG: 3.12 TABLET, FILM COATED ORAL at 17:30

## 2019-05-19 RX ADMIN — IPRATROPIUM BROMIDE AND ALBUTEROL SULFATE 3 ML: 2.5; .5 SOLUTION RESPIRATORY (INHALATION) at 19:32

## 2019-05-19 RX ADMIN — INSULIN GLARGINE 10 UNITS: 100 INJECTION, SOLUTION SUBCUTANEOUS at 21:00

## 2019-05-19 RX ADMIN — FUROSEMIDE 40 MG: 40 TABLET ORAL at 09:06

## 2019-05-19 RX ADMIN — INSULIN LISPRO 2 UNITS: 100 INJECTION, SOLUTION INTRAVENOUS; SUBCUTANEOUS at 17:30

## 2019-05-19 RX ADMIN — CARVEDILOL 3.12 MG: 3.12 TABLET, FILM COATED ORAL at 09:06

## 2019-05-19 RX ADMIN — METHYLPREDNISOLONE SODIUM SUCCINATE 40 MG: 40 INJECTION, POWDER, FOR SOLUTION INTRAMUSCULAR; INTRAVENOUS at 20:56

## 2019-05-19 RX ADMIN — CITALOPRAM HYDROBROMIDE 40 MG: 20 TABLET ORAL at 09:07

## 2019-05-19 RX ADMIN — ASPIRIN 81 MG 81 MG: 81 TABLET ORAL at 09:06

## 2019-05-19 RX ADMIN — LISINOPRIL 10 MG: 10 TABLET ORAL at 09:07

## 2019-05-19 RX ADMIN — PREDNISONE 40 MG: 20 TABLET ORAL at 09:06

## 2019-05-19 RX ADMIN — POTASSIUM CHLORIDE 40 MEQ: 1500 TABLET, EXTENDED RELEASE ORAL at 17:30

## 2019-05-19 RX ADMIN — INSULIN LISPRO 2 UNITS: 100 INJECTION, SOLUTION INTRAVENOUS; SUBCUTANEOUS at 09:06

## 2019-05-19 RX ADMIN — PANTOPRAZOLE SODIUM 40 MG: 40 TABLET, DELAYED RELEASE ORAL at 05:21

## 2019-05-19 RX ADMIN — ENOXAPARIN SODIUM 40 MG: 40 INJECTION SUBCUTANEOUS at 09:06

## 2019-05-19 RX ADMIN — INSULIN LISPRO 4 UNITS: 100 INJECTION, SOLUTION INTRAVENOUS; SUBCUTANEOUS at 11:36

## 2019-05-19 RX ADMIN — ATORVASTATIN CALCIUM 20 MG: 20 TABLET, FILM COATED ORAL at 21:00

## 2019-05-19 RX ADMIN — FUROSEMIDE 40 MG: 40 TABLET ORAL at 17:30

## 2019-05-19 RX ADMIN — LISINOPRIL 10 MG: 10 TABLET ORAL at 17:30

## 2019-05-19 RX ADMIN — IPRATROPIUM BROMIDE AND ALBUTEROL SULFATE 3 ML: 2.5; .5 SOLUTION RESPIRATORY (INHALATION) at 07:00

## 2019-05-19 RX ADMIN — INSULIN GLARGINE 5 UNITS: 100 INJECTION, SOLUTION SUBCUTANEOUS at 11:35

## 2019-05-19 RX ADMIN — INSULIN LISPRO 4 UNITS: 100 INJECTION, SOLUTION INTRAVENOUS; SUBCUTANEOUS at 21:00

## 2019-05-20 ENCOUNTER — HOSPITAL ENCOUNTER (INPATIENT)
Facility: HOSPITAL | Age: 64
LOS: 3 days | Discharge: HOME | DRG: 881 | End: 2019-05-23
Attending: HOSPITALIST | Admitting: HOSPITALIST
Payer: MEDICARE

## 2019-05-20 ENCOUNTER — APPOINTMENT (INPATIENT)
Dept: NON INVASIVE DIAGNOSTICS | Facility: HOSPITAL | Age: 64
DRG: 191 | End: 2019-05-20
Payer: COMMERCIAL

## 2019-05-20 VITALS
RESPIRATION RATE: 18 BRPM | SYSTOLIC BLOOD PRESSURE: 174 MMHG | BODY MASS INDEX: 24.07 KG/M2 | DIASTOLIC BLOOD PRESSURE: 78 MMHG | OXYGEN SATURATION: 92 % | WEIGHT: 158.8 LBS | HEART RATE: 72 BPM | TEMPERATURE: 96.4 F | HEIGHT: 68 IN

## 2019-05-20 DIAGNOSIS — K21.9 GERD (GASTROESOPHAGEAL REFLUX DISEASE): ICD-10-CM

## 2019-05-20 DIAGNOSIS — G89.29 OTHER CHRONIC PAIN: ICD-10-CM

## 2019-05-20 DIAGNOSIS — J44.1 COPD EXACERBATION (HCC): ICD-10-CM

## 2019-05-20 DIAGNOSIS — I10 ESSENTIAL HYPERTENSION: ICD-10-CM

## 2019-05-20 DIAGNOSIS — N52.8 OTHER MALE ERECTILE DYSFUNCTION: ICD-10-CM

## 2019-05-20 DIAGNOSIS — I10 HYPERTENSION, UNSPECIFIED TYPE: ICD-10-CM

## 2019-05-20 DIAGNOSIS — E78.2 MIXED HYPERLIPIDEMIA: ICD-10-CM

## 2019-05-20 DIAGNOSIS — I10 HYPERTENSION: ICD-10-CM

## 2019-05-20 DIAGNOSIS — J40 BRONCHITIS: ICD-10-CM

## 2019-05-20 DIAGNOSIS — E55.9 VITAMIN D DEFICIENCY: ICD-10-CM

## 2019-05-20 DIAGNOSIS — F33.1 MODERATE EPISODE OF RECURRENT MAJOR DEPRESSIVE DISORDER (HCC): Primary | ICD-10-CM

## 2019-05-20 DIAGNOSIS — E53.8 VITAMIN B12 DEFICIENCY: ICD-10-CM

## 2019-05-20 DIAGNOSIS — E11.9 TYPE 2 DIABETES MELLITUS WITHOUT COMPLICATION, WITHOUT LONG-TERM CURRENT USE OF INSULIN (HCC): ICD-10-CM

## 2019-05-20 DIAGNOSIS — E11.9 DIABETES MELLITUS TYPE 2 IN NONOBESE (HCC): ICD-10-CM

## 2019-05-20 DIAGNOSIS — G47.09 OTHER INSOMNIA: ICD-10-CM

## 2019-05-20 DIAGNOSIS — Z72.0 TOBACCO ABUSE: ICD-10-CM

## 2019-05-20 DIAGNOSIS — I25.5 ISCHEMIC CARDIOMYOPATHY: ICD-10-CM

## 2019-05-20 PROBLEM — J20.1 ACUTE BRONCHITIS DUE TO HAEMOPHILUS INFLUENZAE: Status: ACTIVE | Noted: 2019-05-20

## 2019-05-20 LAB
ANION GAP SERPL CALCULATED.3IONS-SCNC: 9 MMOL/L (ref 4–13)
BACTERIA SPT RESP CULT: ABNORMAL
BACTERIA SPT RESP CULT: ABNORMAL
BUN SERPL-MCNC: 14 MG/DL (ref 7–25)
CALCIUM SERPL-MCNC: 9.7 MG/DL (ref 8.6–10.5)
CHLORIDE SERPL-SCNC: 99 MMOL/L (ref 98–107)
CO2 SERPL-SCNC: 27 MMOL/L (ref 21–31)
CREAT SERPL-MCNC: 0.94 MG/DL (ref 0.7–1.3)
ERYTHROCYTE [DISTWIDTH] IN BLOOD BY AUTOMATED COUNT: 13.1 % (ref 11.5–14.5)
GFR SERPL CREATININE-BSD FRML MDRD: 85 ML/MIN/1.73SQ M
GLUCOSE SERPL-MCNC: 255 MG/DL (ref 65–140)
GLUCOSE SERPL-MCNC: 267 MG/DL (ref 65–99)
GLUCOSE SERPL-MCNC: 289 MG/DL (ref 65–140)
GLUCOSE SERPL-MCNC: 311 MG/DL (ref 65–140)
GLUCOSE SERPL-MCNC: 356 MG/DL (ref 65–140)
GRAM STN SPEC: ABNORMAL
HCT VFR BLD AUTO: 43.1 % (ref 42–47)
HGB BLD-MCNC: 14.4 G/DL (ref 14–18)
MCH RBC QN AUTO: 31.4 PG (ref 26–34)
MCHC RBC AUTO-ENTMCNC: 33.5 G/DL (ref 31–37)
MCV RBC AUTO: 94 FL (ref 81–99)
PLATELET # BLD AUTO: 323 THOUSANDS/UL (ref 149–390)
PMV BLD AUTO: 8.1 FL (ref 8.6–11.7)
POTASSIUM SERPL-SCNC: 3.8 MMOL/L (ref 3.5–5.5)
RBC # BLD AUTO: 4.59 MILLION/UL (ref 4.3–5.9)
SODIUM SERPL-SCNC: 135 MMOL/L (ref 134–143)
WBC # BLD AUTO: 14.6 THOUSAND/UL (ref 4.8–10.8)

## 2019-05-20 PROCEDURE — 80048 BASIC METABOLIC PNL TOTAL CA: CPT | Performed by: INTERNAL MEDICINE

## 2019-05-20 PROCEDURE — 85027 COMPLETE CBC AUTOMATED: CPT | Performed by: INTERNAL MEDICINE

## 2019-05-20 PROCEDURE — 82948 REAGENT STRIP/BLOOD GLUCOSE: CPT

## 2019-05-20 PROCEDURE — 94760 N-INVAS EAR/PLS OXIMETRY 1: CPT

## 2019-05-20 PROCEDURE — 93306 TTE W/DOPPLER COMPLETE: CPT | Performed by: INTERNAL MEDICINE

## 2019-05-20 PROCEDURE — 94668 MNPJ CHEST WALL SBSQ: CPT

## 2019-05-20 PROCEDURE — 93306 TTE W/DOPPLER COMPLETE: CPT

## 2019-05-20 PROCEDURE — 99239 HOSP IP/OBS DSCHRG MGMT >30: CPT | Performed by: INTERNAL MEDICINE

## 2019-05-20 PROCEDURE — 94640 AIRWAY INHALATION TREATMENT: CPT

## 2019-05-20 PROCEDURE — 94664 DEMO&/EVAL PT USE INHALER: CPT

## 2019-05-20 RX ORDER — MAGNESIUM HYDROXIDE/ALUMINUM HYDROXICE/SIMETHICONE 120; 1200; 1200 MG/30ML; MG/30ML; MG/30ML
30 SUSPENSION ORAL EVERY 4 HOURS PRN
Status: DISCONTINUED | OUTPATIENT
Start: 2019-05-20 | End: 2019-05-23 | Stop reason: HOSPADM

## 2019-05-20 RX ORDER — CEFDINIR 300 MG/1
300 CAPSULE ORAL EVERY 12 HOURS SCHEDULED
Status: DISCONTINUED | OUTPATIENT
Start: 2019-05-21 | End: 2019-05-23 | Stop reason: HOSPADM

## 2019-05-20 RX ORDER — INSULIN GLARGINE 100 [IU]/ML
10 INJECTION, SOLUTION SUBCUTANEOUS
Status: CANCELLED | OUTPATIENT
Start: 2019-05-20

## 2019-05-20 RX ORDER — CITALOPRAM 20 MG/1
40 TABLET ORAL DAILY
Status: CANCELLED | OUTPATIENT
Start: 2019-05-21

## 2019-05-20 RX ORDER — ATORVASTATIN CALCIUM 20 MG/1
20 TABLET, FILM COATED ORAL
Status: DISCONTINUED | OUTPATIENT
Start: 2019-05-20 | End: 2019-05-23 | Stop reason: HOSPADM

## 2019-05-20 RX ORDER — CEFTRIAXONE 1 G/50ML
1000 INJECTION, SOLUTION INTRAVENOUS EVERY 24 HOURS
Status: DISCONTINUED | OUTPATIENT
Start: 2019-05-20 | End: 2019-05-20 | Stop reason: HOSPADM

## 2019-05-20 RX ORDER — PANTOPRAZOLE SODIUM 40 MG/1
40 TABLET, DELAYED RELEASE ORAL
Status: CANCELLED | OUTPATIENT
Start: 2019-05-21

## 2019-05-20 RX ORDER — TRAMADOL HYDROCHLORIDE 50 MG/1
50 TABLET ORAL 2 TIMES DAILY PRN
Status: DISCONTINUED | OUTPATIENT
Start: 2019-05-20 | End: 2019-05-23 | Stop reason: HOSPADM

## 2019-05-20 RX ORDER — CEFDINIR 300 MG/1
300 CAPSULE ORAL EVERY 12 HOURS SCHEDULED
Status: CANCELLED | OUTPATIENT
Start: 2019-05-21 | End: 2019-05-25

## 2019-05-20 RX ORDER — INSULIN GLARGINE 100 [IU]/ML
10 INJECTION, SOLUTION SUBCUTANEOUS
Status: DISCONTINUED | OUTPATIENT
Start: 2019-05-20 | End: 2019-05-23 | Stop reason: HOSPADM

## 2019-05-20 RX ORDER — LISINOPRIL 10 MG/1
10 TABLET ORAL 2 TIMES DAILY
Status: CANCELLED | OUTPATIENT
Start: 2019-05-20

## 2019-05-20 RX ORDER — HYDROXYZINE HYDROCHLORIDE 25 MG/1
25 TABLET, FILM COATED ORAL EVERY 4 HOURS PRN
Status: DISCONTINUED | OUTPATIENT
Start: 2019-05-20 | End: 2019-05-23 | Stop reason: HOSPADM

## 2019-05-20 RX ORDER — TRAZODONE HYDROCHLORIDE 50 MG/1
100 TABLET ORAL
Status: CANCELLED | OUTPATIENT
Start: 2019-05-20

## 2019-05-20 RX ORDER — PANTOPRAZOLE SODIUM 40 MG/1
40 TABLET, DELAYED RELEASE ORAL
Status: DISCONTINUED | OUTPATIENT
Start: 2019-05-21 | End: 2019-05-23 | Stop reason: HOSPADM

## 2019-05-20 RX ORDER — ATORVASTATIN CALCIUM 20 MG/1
20 TABLET, FILM COATED ORAL
Status: CANCELLED | OUTPATIENT
Start: 2019-05-20

## 2019-05-20 RX ORDER — FUROSEMIDE 40 MG/1
40 TABLET ORAL 2 TIMES DAILY
Status: CANCELLED | OUTPATIENT
Start: 2019-05-20

## 2019-05-20 RX ORDER — CARVEDILOL 3.12 MG/1
3.12 TABLET ORAL 2 TIMES DAILY WITH MEALS
Status: DISCONTINUED | OUTPATIENT
Start: 2019-05-21 | End: 2019-05-23 | Stop reason: HOSPADM

## 2019-05-20 RX ORDER — LISINOPRIL 10 MG/1
10 TABLET ORAL 2 TIMES DAILY
Status: DISCONTINUED | OUTPATIENT
Start: 2019-05-21 | End: 2019-05-21

## 2019-05-20 RX ORDER — HYDROXYZINE HYDROCHLORIDE 25 MG/1
25 TABLET, FILM COATED ORAL EVERY 4 HOURS PRN
Status: CANCELLED | OUTPATIENT
Start: 2019-05-20

## 2019-05-20 RX ORDER — LORAZEPAM 0.5 MG/1
0.5 TABLET ORAL EVERY 4 HOURS PRN
Status: DISCONTINUED | OUTPATIENT
Start: 2019-05-20 | End: 2019-05-23 | Stop reason: HOSPADM

## 2019-05-20 RX ORDER — MAGNESIUM HYDROXIDE/ALUMINUM HYDROXICE/SIMETHICONE 120; 1200; 1200 MG/30ML; MG/30ML; MG/30ML
30 SUSPENSION ORAL EVERY 4 HOURS PRN
Status: CANCELLED | OUTPATIENT
Start: 2019-05-20

## 2019-05-20 RX ORDER — TRAMADOL HYDROCHLORIDE 50 MG/1
50 TABLET ORAL 2 TIMES DAILY PRN
Status: CANCELLED | OUTPATIENT
Start: 2019-05-20

## 2019-05-20 RX ORDER — CARVEDILOL 3.12 MG/1
3.12 TABLET ORAL 2 TIMES DAILY WITH MEALS
Status: CANCELLED | OUTPATIENT
Start: 2019-05-20

## 2019-05-20 RX ORDER — ACETAMINOPHEN 325 MG/1
650 TABLET ORAL EVERY 6 HOURS PRN
Status: CANCELLED | OUTPATIENT
Start: 2019-05-20

## 2019-05-20 RX ORDER — ALBUTEROL SULFATE 90 UG/1
2 AEROSOL, METERED RESPIRATORY (INHALATION) EVERY 4 HOURS PRN
Status: DISCONTINUED | OUTPATIENT
Start: 2019-05-20 | End: 2019-05-21

## 2019-05-20 RX ORDER — CITALOPRAM 20 MG/1
40 TABLET ORAL DAILY
Status: DISCONTINUED | OUTPATIENT
Start: 2019-05-21 | End: 2019-05-21

## 2019-05-20 RX ORDER — LORAZEPAM 2 MG/ML
1 INJECTION INTRAMUSCULAR EVERY 4 HOURS PRN
Status: DISCONTINUED | OUTPATIENT
Start: 2019-05-20 | End: 2019-05-23 | Stop reason: HOSPADM

## 2019-05-20 RX ORDER — LORAZEPAM 2 MG/ML
1 INJECTION INTRAMUSCULAR EVERY 4 HOURS PRN
Status: CANCELLED | OUTPATIENT
Start: 2019-05-20

## 2019-05-20 RX ORDER — ASPIRIN 81 MG/1
81 TABLET, CHEWABLE ORAL DAILY
Status: DISCONTINUED | OUTPATIENT
Start: 2019-05-21 | End: 2019-05-23 | Stop reason: HOSPADM

## 2019-05-20 RX ORDER — PREDNISONE 10 MG/1
30 TABLET ORAL DAILY
Refills: 0 | Status: ON HOLD
Start: 2019-05-20 | End: 2019-05-22 | Stop reason: SDUPTHER

## 2019-05-20 RX ORDER — FUROSEMIDE 40 MG/1
40 TABLET ORAL 2 TIMES DAILY
Status: DISCONTINUED | OUTPATIENT
Start: 2019-05-21 | End: 2019-05-23 | Stop reason: HOSPADM

## 2019-05-20 RX ORDER — TRAZODONE HYDROCHLORIDE 50 MG/1
100 TABLET ORAL
Status: DISCONTINUED | OUTPATIENT
Start: 2019-05-20 | End: 2019-05-23 | Stop reason: HOSPADM

## 2019-05-20 RX ORDER — LORAZEPAM 0.5 MG/1
0.5 TABLET ORAL EVERY 4 HOURS PRN
Status: CANCELLED | OUTPATIENT
Start: 2019-05-20

## 2019-05-20 RX ORDER — NICOTINE 21 MG/24HR
14 PATCH, TRANSDERMAL 24 HOURS TRANSDERMAL DAILY
Status: DISCONTINUED | OUTPATIENT
Start: 2019-05-21 | End: 2019-05-23 | Stop reason: HOSPADM

## 2019-05-20 RX ORDER — ASPIRIN 81 MG/1
81 TABLET, CHEWABLE ORAL DAILY
Status: CANCELLED | OUTPATIENT
Start: 2019-05-21

## 2019-05-20 RX ORDER — ACETAMINOPHEN 325 MG/1
650 TABLET ORAL EVERY 6 HOURS PRN
Status: DISCONTINUED | OUTPATIENT
Start: 2019-05-20 | End: 2019-05-23 | Stop reason: HOSPADM

## 2019-05-20 RX ADMIN — IPRATROPIUM BROMIDE AND ALBUTEROL SULFATE 3 ML: 2.5; .5 SOLUTION RESPIRATORY (INHALATION) at 13:57

## 2019-05-20 RX ADMIN — METHYLPREDNISOLONE SODIUM SUCCINATE 40 MG: 40 INJECTION, POWDER, FOR SOLUTION INTRAMUSCULAR; INTRAVENOUS at 08:56

## 2019-05-20 RX ADMIN — INSULIN LISPRO 4 UNITS: 100 INJECTION, SOLUTION INTRAVENOUS; SUBCUTANEOUS at 16:18

## 2019-05-20 RX ADMIN — INSULIN LISPRO 3 UNITS: 100 INJECTION, SOLUTION INTRAVENOUS; SUBCUTANEOUS at 07:18

## 2019-05-20 RX ADMIN — ALBUTEROL SULFATE 2 PUFF: 90 AEROSOL, METERED RESPIRATORY (INHALATION) at 22:04

## 2019-05-20 RX ADMIN — ENOXAPARIN SODIUM 40 MG: 40 INJECTION SUBCUTANEOUS at 08:56

## 2019-05-20 RX ADMIN — IPRATROPIUM BROMIDE AND ALBUTEROL SULFATE 3 ML: 2.5; .5 SOLUTION RESPIRATORY (INHALATION) at 07:00

## 2019-05-20 RX ADMIN — CEFTRIAXONE 1000 MG: 1 INJECTION, SOLUTION INTRAVENOUS at 08:56

## 2019-05-20 RX ADMIN — ATORVASTATIN CALCIUM 20 MG: 20 TABLET, FILM COATED ORAL at 21:57

## 2019-05-20 RX ADMIN — INSULIN GLARGINE 10 UNITS: 100 INJECTION, SOLUTION SUBCUTANEOUS at 21:58

## 2019-05-20 RX ADMIN — FUROSEMIDE 40 MG: 40 TABLET ORAL at 17:27

## 2019-05-20 RX ADMIN — ASPIRIN 81 MG 81 MG: 81 TABLET ORAL at 08:57

## 2019-05-20 RX ADMIN — TRAZODONE HYDROCHLORIDE 100 MG: 50 TABLET ORAL at 21:57

## 2019-05-20 RX ADMIN — CITALOPRAM HYDROBROMIDE 40 MG: 20 TABLET ORAL at 08:57

## 2019-05-20 RX ADMIN — FUROSEMIDE 40 MG: 40 TABLET ORAL at 08:57

## 2019-05-20 RX ADMIN — PANTOPRAZOLE SODIUM 40 MG: 40 TABLET, DELAYED RELEASE ORAL at 06:01

## 2019-05-20 RX ADMIN — LISINOPRIL 10 MG: 10 TABLET ORAL at 17:27

## 2019-05-20 RX ADMIN — INSULIN LISPRO 4 UNITS: 100 INJECTION, SOLUTION INTRAVENOUS; SUBCUTANEOUS at 21:58

## 2019-05-20 RX ADMIN — INSULIN LISPRO 5 UNITS: 100 INJECTION, SOLUTION INTRAVENOUS; SUBCUTANEOUS at 13:26

## 2019-05-20 RX ADMIN — CARVEDILOL 3.12 MG: 3.12 TABLET, FILM COATED ORAL at 07:18

## 2019-05-20 RX ADMIN — LISINOPRIL 10 MG: 10 TABLET ORAL at 08:57

## 2019-05-20 RX ADMIN — CARVEDILOL 3.12 MG: 3.12 TABLET, FILM COATED ORAL at 16:17

## 2019-05-21 LAB
25(OH)D3 SERPL-MCNC: 10.2 NG/ML (ref 30–100)
ALBUMIN SERPL BCP-MCNC: 3.9 G/DL (ref 3.5–5.7)
ALP SERPL-CCNC: 46 U/L (ref 55–165)
ALT SERPL W P-5'-P-CCNC: 8 U/L (ref 7–52)
ANION GAP SERPL CALCULATED.3IONS-SCNC: 8 MMOL/L (ref 4–13)
AST SERPL W P-5'-P-CCNC: 11 U/L (ref 13–39)
BASOPHILS # BLD AUTO: 0 THOUSANDS/ΜL (ref 0–0.1)
BASOPHILS NFR BLD AUTO: 0 % (ref 0–2)
BILIRUB SERPL-MCNC: 0.3 MG/DL (ref 0.2–1)
BUN SERPL-MCNC: 19 MG/DL (ref 7–25)
CALCIUM SERPL-MCNC: 9.9 MG/DL (ref 8.6–10.5)
CHLORIDE SERPL-SCNC: 98 MMOL/L (ref 98–107)
CO2 SERPL-SCNC: 32 MMOL/L (ref 21–31)
CREAT SERPL-MCNC: 1.01 MG/DL (ref 0.7–1.3)
EOSINOPHIL # BLD AUTO: 0.1 THOUSAND/ΜL (ref 0–0.61)
EOSINOPHIL NFR BLD AUTO: 1 % (ref 0–5)
ERYTHROCYTE [DISTWIDTH] IN BLOOD BY AUTOMATED COUNT: 13.2 % (ref 11.5–14.5)
EST. AVERAGE GLUCOSE BLD GHB EST-MCNC: 148 MG/DL
GFR SERPL CREATININE-BSD FRML MDRD: 78 ML/MIN/1.73SQ M
GLUCOSE P FAST SERPL-MCNC: 196 MG/DL (ref 65–99)
GLUCOSE SERPL-MCNC: 166 MG/DL (ref 65–140)
GLUCOSE SERPL-MCNC: 196 MG/DL (ref 65–99)
GLUCOSE SERPL-MCNC: 216 MG/DL (ref 65–140)
GLUCOSE SERPL-MCNC: 274 MG/DL (ref 65–140)
GLUCOSE SERPL-MCNC: 315 MG/DL (ref 65–140)
HBA1C MFR BLD: 6.8 % (ref 4.2–6.3)
HCT VFR BLD AUTO: 44.4 % (ref 42–47)
HGB BLD-MCNC: 14.7 G/DL (ref 14–18)
LYMPHOCYTES # BLD AUTO: 3.3 THOUSANDS/ΜL (ref 0.6–4.47)
LYMPHOCYTES NFR BLD AUTO: 21 % (ref 21–51)
MCH RBC QN AUTO: 30.7 PG (ref 26–34)
MCHC RBC AUTO-ENTMCNC: 33.2 G/DL (ref 31–37)
MCV RBC AUTO: 93 FL (ref 81–99)
MONOCYTES # BLD AUTO: 1.7 THOUSAND/ΜL (ref 0.17–1.22)
MONOCYTES NFR BLD AUTO: 11 % (ref 2–12)
NEUTROPHILS # BLD AUTO: 10.4 THOUSANDS/ΜL (ref 1.4–6.5)
NEUTS SEG NFR BLD AUTO: 67 % (ref 42–75)
PLATELET # BLD AUTO: 376 THOUSANDS/UL (ref 149–390)
PMV BLD AUTO: 8.1 FL (ref 8.6–11.7)
POTASSIUM SERPL-SCNC: 3.9 MMOL/L (ref 3.5–5.5)
PROT SERPL-MCNC: 7.1 G/DL (ref 6.4–8.9)
RBC # BLD AUTO: 4.8 MILLION/UL (ref 4.3–5.9)
SODIUM SERPL-SCNC: 138 MMOL/L (ref 134–143)
VIT B12 SERPL-MCNC: 339 PG/ML (ref 100–900)
WBC # BLD AUTO: 15.6 THOUSAND/UL (ref 4.8–10.8)

## 2019-05-21 PROCEDURE — 94668 MNPJ CHEST WALL SBSQ: CPT

## 2019-05-21 PROCEDURE — 94664 DEMO&/EVAL PT USE INHALER: CPT

## 2019-05-21 PROCEDURE — 82948 REAGENT STRIP/BLOOD GLUCOSE: CPT

## 2019-05-21 PROCEDURE — 80053 COMPREHEN METABOLIC PANEL: CPT | Performed by: NURSE PRACTITIONER

## 2019-05-21 PROCEDURE — 94640 AIRWAY INHALATION TREATMENT: CPT

## 2019-05-21 PROCEDURE — 94760 N-INVAS EAR/PLS OXIMETRY 1: CPT

## 2019-05-21 PROCEDURE — 83036 HEMOGLOBIN GLYCOSYLATED A1C: CPT | Performed by: PHYSICIAN ASSISTANT

## 2019-05-21 PROCEDURE — 85025 COMPLETE CBC W/AUTO DIFF WBC: CPT | Performed by: PHYSICIAN ASSISTANT

## 2019-05-21 PROCEDURE — 82607 VITAMIN B-12: CPT | Performed by: PHYSICIAN ASSISTANT

## 2019-05-21 PROCEDURE — 82306 VITAMIN D 25 HYDROXY: CPT | Performed by: PHYSICIAN ASSISTANT

## 2019-05-21 RX ORDER — IPRATROPIUM BROMIDE AND ALBUTEROL SULFATE 2.5; .5 MG/3ML; MG/3ML
3 SOLUTION RESPIRATORY (INHALATION)
Status: DISCONTINUED | OUTPATIENT
Start: 2019-05-21 | End: 2019-05-21

## 2019-05-21 RX ORDER — LISINOPRIL 10 MG/1
10 TABLET ORAL ONCE
Status: COMPLETED | OUTPATIENT
Start: 2019-05-21 | End: 2019-05-21

## 2019-05-21 RX ORDER — LISINOPRIL 20 MG/1
20 TABLET ORAL 2 TIMES DAILY
Status: DISCONTINUED | OUTPATIENT
Start: 2019-05-21 | End: 2019-05-23 | Stop reason: HOSPADM

## 2019-05-21 RX ORDER — ALBUTEROL SULFATE 2.5 MG/3ML
2.5 SOLUTION RESPIRATORY (INHALATION) EVERY 4 HOURS PRN
Status: DISCONTINUED | OUTPATIENT
Start: 2019-05-21 | End: 2019-05-23 | Stop reason: HOSPADM

## 2019-05-21 RX ORDER — GUAIFENESIN 600 MG
600 TABLET, EXTENDED RELEASE 12 HR ORAL EVERY 12 HOURS PRN
Status: DISCONTINUED | OUTPATIENT
Start: 2019-05-21 | End: 2019-05-23 | Stop reason: HOSPADM

## 2019-05-21 RX ORDER — IPRATROPIUM BROMIDE AND ALBUTEROL SULFATE 2.5; .5 MG/3ML; MG/3ML
3 SOLUTION RESPIRATORY (INHALATION)
Status: DISCONTINUED | OUTPATIENT
Start: 2019-05-22 | End: 2019-05-22

## 2019-05-21 RX ORDER — ESCITALOPRAM OXALATE 10 MG/1
10 TABLET ORAL DAILY
Status: DISCONTINUED | OUTPATIENT
Start: 2019-05-21 | End: 2019-05-23 | Stop reason: HOSPADM

## 2019-05-21 RX ADMIN — ASPIRIN 81 MG 81 MG: 81 TABLET ORAL at 08:20

## 2019-05-21 RX ADMIN — CEFDINIR 300 MG: 300 CAPSULE ORAL at 08:20

## 2019-05-21 RX ADMIN — CITALOPRAM HYDROBROMIDE 40 MG: 20 TABLET ORAL at 08:16

## 2019-05-21 RX ADMIN — INSULIN LISPRO 5 UNITS: 100 INJECTION, SOLUTION INTRAVENOUS; SUBCUTANEOUS at 17:22

## 2019-05-21 RX ADMIN — INSULIN LISPRO 4 UNITS: 100 INJECTION, SOLUTION INTRAVENOUS; SUBCUTANEOUS at 12:36

## 2019-05-21 RX ADMIN — LISINOPRIL 20 MG: 20 TABLET ORAL at 17:45

## 2019-05-21 RX ADMIN — IPRATROPIUM BROMIDE AND ALBUTEROL SULFATE 3 ML: .5; 3 SOLUTION RESPIRATORY (INHALATION) at 14:15

## 2019-05-21 RX ADMIN — PANTOPRAZOLE SODIUM 40 MG: 40 TABLET, DELAYED RELEASE ORAL at 05:59

## 2019-05-21 RX ADMIN — ALBUTEROL SULFATE 2 PUFF: 90 AEROSOL, METERED RESPIRATORY (INHALATION) at 08:15

## 2019-05-21 RX ADMIN — TRAMADOL HYDROCHLORIDE 50 MG: 50 TABLET, COATED ORAL at 16:57

## 2019-05-21 RX ADMIN — LISINOPRIL 10 MG: 10 TABLET ORAL at 10:11

## 2019-05-21 RX ADMIN — ATORVASTATIN CALCIUM 20 MG: 20 TABLET, FILM COATED ORAL at 21:15

## 2019-05-21 RX ADMIN — FUROSEMIDE 40 MG: 40 TABLET ORAL at 17:45

## 2019-05-21 RX ADMIN — INSULIN LISPRO 2 UNITS: 100 INJECTION, SOLUTION INTRAVENOUS; SUBCUTANEOUS at 21:16

## 2019-05-21 RX ADMIN — IPRATROPIUM BROMIDE AND ALBUTEROL SULFATE 3 ML: .5; 3 SOLUTION RESPIRATORY (INHALATION) at 20:51

## 2019-05-21 RX ADMIN — INSULIN LISPRO 1 UNITS: 100 INJECTION, SOLUTION INTRAVENOUS; SUBCUTANEOUS at 08:21

## 2019-05-21 RX ADMIN — TRAZODONE HYDROCHLORIDE 100 MG: 50 TABLET ORAL at 21:15

## 2019-05-21 RX ADMIN — NICOTINE 14 MG: 14 PATCH TRANSDERMAL at 08:16

## 2019-05-21 RX ADMIN — LISINOPRIL 10 MG: 10 TABLET ORAL at 08:17

## 2019-05-21 RX ADMIN — CARVEDILOL 3.12 MG: 3.12 TABLET, FILM COATED ORAL at 16:48

## 2019-05-21 RX ADMIN — FUROSEMIDE 40 MG: 40 TABLET ORAL at 08:19

## 2019-05-21 RX ADMIN — ESCITALOPRAM OXALATE 10 MG: 10 TABLET ORAL at 16:49

## 2019-05-21 RX ADMIN — INSULIN GLARGINE 10 UNITS: 100 INJECTION, SOLUTION SUBCUTANEOUS at 21:15

## 2019-05-21 RX ADMIN — CEFDINIR 300 MG: 300 CAPSULE ORAL at 21:15

## 2019-05-21 RX ADMIN — CARVEDILOL 3.12 MG: 3.12 TABLET, FILM COATED ORAL at 08:17

## 2019-05-22 LAB
GLUCOSE SERPL-MCNC: 144 MG/DL (ref 65–140)
GLUCOSE SERPL-MCNC: 176 MG/DL (ref 65–140)
GLUCOSE SERPL-MCNC: 303 MG/DL (ref 65–140)
GLUCOSE SERPL-MCNC: 394 MG/DL (ref 65–140)

## 2019-05-22 PROCEDURE — 94640 AIRWAY INHALATION TREATMENT: CPT

## 2019-05-22 PROCEDURE — 99231 SBSQ HOSP IP/OBS SF/LOW 25: CPT | Performed by: NURSE PRACTITIONER

## 2019-05-22 PROCEDURE — 82948 REAGENT STRIP/BLOOD GLUCOSE: CPT

## 2019-05-22 PROCEDURE — 94760 N-INVAS EAR/PLS OXIMETRY 1: CPT

## 2019-05-22 RX ORDER — TRAMADOL HYDROCHLORIDE 50 MG/1
50 TABLET ORAL 2 TIMES DAILY PRN
Qty: 60 TABLET | Refills: 0 | Status: SHIPPED | OUTPATIENT
Start: 2019-05-22 | End: 2019-06-21

## 2019-05-22 RX ORDER — ATORVASTATIN CALCIUM 20 MG/1
20 TABLET, FILM COATED ORAL
Qty: 30 TABLET | Refills: 0 | Status: SHIPPED | OUTPATIENT
Start: 2019-05-22 | End: 2019-09-04 | Stop reason: SDUPTHER

## 2019-05-22 RX ORDER — NITROGLYCERIN 0.4 MG/1
0.4 TABLET SUBLINGUAL
Qty: 30 TABLET | Refills: 0 | Status: SHIPPED | OUTPATIENT
Start: 2019-05-22 | End: 2020-05-11 | Stop reason: SDUPTHER

## 2019-05-22 RX ORDER — CARVEDILOL 3.12 MG/1
3.12 TABLET ORAL 2 TIMES DAILY WITH MEALS
Qty: 60 TABLET | Refills: 0 | Status: SHIPPED | OUTPATIENT
Start: 2019-05-22 | End: 2019-10-16 | Stop reason: SDUPTHER

## 2019-05-22 RX ORDER — ALBUTEROL SULFATE 90 UG/1
2 AEROSOL, METERED RESPIRATORY (INHALATION) EVERY 4 HOURS PRN
Qty: 1 INHALER | Refills: 0 | Status: SHIPPED | OUTPATIENT
Start: 2019-05-22 | End: 2020-12-29 | Stop reason: SDUPTHER

## 2019-05-22 RX ORDER — LISINOPRIL 10 MG/1
10 TABLET ORAL 2 TIMES DAILY
Qty: 180 TABLET | Refills: 0 | Status: SHIPPED | OUTPATIENT
Start: 2019-05-22 | End: 2019-09-04 | Stop reason: SDUPTHER

## 2019-05-22 RX ORDER — NICOTINE 21 MG/24HR
1 PATCH, TRANSDERMAL 24 HOURS TRANSDERMAL DAILY
Qty: 28 PATCH | Refills: 0 | Status: SHIPPED | OUTPATIENT
Start: 2019-05-23 | End: 2019-09-04 | Stop reason: ALTCHOICE

## 2019-05-22 RX ORDER — CEFDINIR 300 MG/1
300 CAPSULE ORAL EVERY 12 HOURS SCHEDULED
Qty: 4 CAPSULE | Refills: 0 | Status: SHIPPED | OUTPATIENT
Start: 2019-05-22 | End: 2019-05-23

## 2019-05-22 RX ORDER — CYANOCOBALAMIN 1000 UG/ML
1000 INJECTION INTRAMUSCULAR; SUBCUTANEOUS
Status: DISCONTINUED | OUTPATIENT
Start: 2019-05-22 | End: 2019-05-23 | Stop reason: HOSPADM

## 2019-05-22 RX ORDER — MELATONIN
1000 DAILY
Status: DISCONTINUED | OUTPATIENT
Start: 2019-08-21 | End: 2019-05-23 | Stop reason: HOSPADM

## 2019-05-22 RX ORDER — SILDENAFIL CITRATE 20 MG/1
20 TABLET ORAL 3 TIMES DAILY
Qty: 90 TABLET | Refills: 0 | Status: SHIPPED | OUTPATIENT
Start: 2019-05-22 | End: 2020-07-22 | Stop reason: SDUPTHER

## 2019-05-22 RX ORDER — FUROSEMIDE 40 MG/1
40 TABLET ORAL 2 TIMES DAILY
Qty: 60 TABLET | Refills: 0 | Status: SHIPPED | OUTPATIENT
Start: 2019-05-22 | End: 2019-10-16 | Stop reason: ALTCHOICE

## 2019-05-22 RX ORDER — IPRATROPIUM BROMIDE AND ALBUTEROL SULFATE 2.5; .5 MG/3ML; MG/3ML
3 SOLUTION RESPIRATORY (INHALATION) 2 TIMES DAILY
Qty: 180 ML | Refills: 0 | Status: SHIPPED | OUTPATIENT
Start: 2019-05-22 | End: 2019-06-21

## 2019-05-22 RX ORDER — PREDNISONE 10 MG/1
30 TABLET ORAL DAILY
Qty: 30 TABLET | Refills: 0 | Status: SHIPPED | OUTPATIENT
Start: 2019-05-22 | End: 2019-06-21

## 2019-05-22 RX ORDER — PANTOPRAZOLE SODIUM 40 MG/1
40 TABLET, DELAYED RELEASE ORAL
Qty: 30 TABLET | Refills: 0 | Status: SHIPPED | OUTPATIENT
Start: 2019-05-23 | End: 2019-09-04

## 2019-05-22 RX ORDER — ERGOCALCIFEROL 1.25 MG/1
50000 CAPSULE ORAL WEEKLY
Qty: 14 CAPSULE | Refills: 0 | Status: SHIPPED | OUTPATIENT
Start: 2019-05-22 | End: 2020-06-10 | Stop reason: ALTCHOICE

## 2019-05-22 RX ORDER — INSULIN GLARGINE 100 [IU]/ML
10 INJECTION, SOLUTION SUBCUTANEOUS
Qty: 300 UNITS | Refills: 0 | Status: SHIPPED | OUTPATIENT
Start: 2019-05-22 | End: 2019-09-04 | Stop reason: ALTCHOICE

## 2019-05-22 RX ORDER — CYANOCOBALAMIN 1000 UG/ML
1000 INJECTION INTRAMUSCULAR; SUBCUTANEOUS
Qty: 1 ML | Refills: 0 | Status: SHIPPED | OUTPATIENT
Start: 2019-05-22 | End: 2020-05-11

## 2019-05-22 RX ORDER — ALBUTEROL SULFATE 90 UG/1
2 AEROSOL, METERED RESPIRATORY (INHALATION)
Status: DISCONTINUED | OUTPATIENT
Start: 2019-05-22 | End: 2019-05-23 | Stop reason: HOSPADM

## 2019-05-22 RX ORDER — ERGOCALCIFEROL 1.25 MG/1
50000 CAPSULE ORAL WEEKLY
Status: DISCONTINUED | OUTPATIENT
Start: 2019-05-22 | End: 2019-05-23 | Stop reason: HOSPADM

## 2019-05-22 RX ORDER — GUAIFENESIN 600 MG
600 TABLET, EXTENDED RELEASE 12 HR ORAL EVERY 12 HOURS PRN
Qty: 30 TABLET | Refills: 0 | Status: SHIPPED | OUTPATIENT
Start: 2019-05-22 | End: 2020-03-18 | Stop reason: ALTCHOICE

## 2019-05-22 RX ADMIN — LISINOPRIL 20 MG: 20 TABLET ORAL at 08:25

## 2019-05-22 RX ADMIN — LISINOPRIL 20 MG: 20 TABLET ORAL at 18:13

## 2019-05-22 RX ADMIN — MAGNESIUM HYDROXIDE 30 ML: 400 SUSPENSION ORAL at 15:04

## 2019-05-22 RX ADMIN — ESCITALOPRAM OXALATE 10 MG: 10 TABLET ORAL at 08:25

## 2019-05-22 RX ADMIN — IPRATROPIUM BROMIDE AND ALBUTEROL SULFATE 3 ML: .5; 3 SOLUTION RESPIRATORY (INHALATION) at 11:39

## 2019-05-22 RX ADMIN — INSULIN GLARGINE 10 UNITS: 100 INJECTION, SOLUTION SUBCUTANEOUS at 21:45

## 2019-05-22 RX ADMIN — CEFDINIR 300 MG: 300 CAPSULE ORAL at 21:45

## 2019-05-22 RX ADMIN — NICOTINE 14 MG: 14 PATCH TRANSDERMAL at 08:26

## 2019-05-22 RX ADMIN — TRAZODONE HYDROCHLORIDE 100 MG: 50 TABLET ORAL at 21:45

## 2019-05-22 RX ADMIN — IPRATROPIUM BROMIDE AND ALBUTEROL SULFATE 3 ML: .5; 3 SOLUTION RESPIRATORY (INHALATION) at 20:45

## 2019-05-22 RX ADMIN — INSULIN LISPRO 6 UNITS: 100 INJECTION, SOLUTION INTRAVENOUS; SUBCUTANEOUS at 12:19

## 2019-05-22 RX ADMIN — PANTOPRAZOLE SODIUM 40 MG: 40 TABLET, DELAYED RELEASE ORAL at 06:00

## 2019-05-22 RX ADMIN — ERGOCALCIFEROL 50000 UNITS: 1.25 CAPSULE, LIQUID FILLED ORAL at 12:19

## 2019-05-22 RX ADMIN — CARVEDILOL 3.12 MG: 3.12 TABLET, FILM COATED ORAL at 08:25

## 2019-05-22 RX ADMIN — CARVEDILOL 3.12 MG: 3.12 TABLET, FILM COATED ORAL at 16:57

## 2019-05-22 RX ADMIN — ASPIRIN 81 MG 81 MG: 81 TABLET ORAL at 08:25

## 2019-05-22 RX ADMIN — CYANOCOBALAMIN 1000 MCG: 1000 INJECTION, SOLUTION INTRAMUSCULAR at 12:19

## 2019-05-22 RX ADMIN — FUROSEMIDE 40 MG: 40 TABLET ORAL at 08:25

## 2019-05-22 RX ADMIN — INSULIN LISPRO 4 UNITS: 100 INJECTION, SOLUTION INTRAVENOUS; SUBCUTANEOUS at 16:58

## 2019-05-22 RX ADMIN — FUROSEMIDE 40 MG: 40 TABLET ORAL at 18:13

## 2019-05-22 RX ADMIN — INSULIN LISPRO 1 UNITS: 100 INJECTION, SOLUTION INTRAVENOUS; SUBCUTANEOUS at 08:26

## 2019-05-22 RX ADMIN — CEFDINIR 300 MG: 300 CAPSULE ORAL at 08:25

## 2019-05-22 RX ADMIN — ATORVASTATIN CALCIUM 20 MG: 20 TABLET, FILM COATED ORAL at 21:45

## 2019-05-23 VITALS
WEIGHT: 162.6 LBS | HEART RATE: 67 BPM | TEMPERATURE: 98.5 F | RESPIRATION RATE: 20 BRPM | BODY MASS INDEX: 24.64 KG/M2 | SYSTOLIC BLOOD PRESSURE: 168 MMHG | OXYGEN SATURATION: 95 % | DIASTOLIC BLOOD PRESSURE: 78 MMHG | HEIGHT: 68 IN

## 2019-05-23 LAB
GLUCOSE SERPL-MCNC: 162 MG/DL
GLUCOSE SERPL-MCNC: 162 MG/DL (ref 65–140)
GLUCOSE SERPL-MCNC: 234 MG/DL (ref 65–140)

## 2019-05-23 PROCEDURE — 99238 HOSP IP/OBS DSCHRG MGMT 30/<: CPT | Performed by: NURSE PRACTITIONER

## 2019-05-23 PROCEDURE — 82948 REAGENT STRIP/BLOOD GLUCOSE: CPT

## 2019-05-23 RX ORDER — ESCITALOPRAM OXALATE 10 MG/1
10 TABLET ORAL DAILY
Qty: 30 TABLET | Refills: 0 | Status: SHIPPED | OUTPATIENT
Start: 2019-05-24 | End: 2019-09-04 | Stop reason: SDUPTHER

## 2019-05-23 RX ORDER — TRAZODONE HYDROCHLORIDE 100 MG/1
100 TABLET ORAL
Qty: 30 TABLET | Refills: 0 | Status: SHIPPED | OUTPATIENT
Start: 2019-05-23 | End: 2019-09-04 | Stop reason: SDUPTHER

## 2019-05-23 RX ADMIN — NICOTINE 14 MG: 14 PATCH TRANSDERMAL at 08:31

## 2019-05-23 RX ADMIN — PANTOPRAZOLE SODIUM 40 MG: 40 TABLET, DELAYED RELEASE ORAL at 05:30

## 2019-05-23 RX ADMIN — ESCITALOPRAM OXALATE 10 MG: 10 TABLET ORAL at 08:29

## 2019-05-23 RX ADMIN — CEFDINIR 300 MG: 300 CAPSULE ORAL at 08:29

## 2019-05-23 RX ADMIN — CARVEDILOL 3.12 MG: 3.12 TABLET, FILM COATED ORAL at 08:28

## 2019-05-23 RX ADMIN — INSULIN LISPRO 1 UNITS: 100 INJECTION, SOLUTION INTRAVENOUS; SUBCUTANEOUS at 08:28

## 2019-05-23 RX ADMIN — FUROSEMIDE 40 MG: 40 TABLET ORAL at 08:29

## 2019-05-23 RX ADMIN — ASPIRIN 81 MG 81 MG: 81 TABLET ORAL at 08:28

## 2019-05-23 RX ADMIN — LISINOPRIL 20 MG: 20 TABLET ORAL at 08:29

## 2019-05-23 RX ADMIN — ALBUTEROL SULFATE 2 PUFF: 90 AEROSOL, METERED RESPIRATORY (INHALATION) at 08:27

## 2019-05-23 RX ADMIN — INSULIN LISPRO 3 UNITS: 100 INJECTION, SOLUTION INTRAVENOUS; SUBCUTANEOUS at 12:19

## 2019-06-11 ENCOUNTER — TELEPHONE (OUTPATIENT)
Dept: CARDIOLOGY CLINIC | Facility: CLINIC | Age: 64
End: 2019-06-11

## 2019-07-26 ENCOUNTER — DOCUMENTATION (OUTPATIENT)
Dept: PSYCHIATRY | Facility: CLINIC | Age: 64
End: 2019-07-26

## 2019-08-28 ENCOUNTER — TELEPHONE (OUTPATIENT)
Dept: INTERNAL MEDICINE CLINIC | Facility: CLINIC | Age: 64
End: 2019-08-28

## 2019-08-28 NOTE — TELEPHONE ENCOUNTER
----- Message from [address] sent at 8/28/2019  8:15 AM EDT -----  Can we get this pt in here before 9/23/19  Koko CUEVAS wants me to fill out a form regarding his capacity and I need to see him   I called pt and offered him appt on 9/4/19 at 10:15 am, he needs to find transportation and will call our office back to confirm

## 2019-09-04 ENCOUNTER — OFFICE VISIT (OUTPATIENT)
Dept: INTERNAL MEDICINE CLINIC | Facility: CLINIC | Age: 64
End: 2019-09-04
Payer: COMMERCIAL

## 2019-09-04 VITALS
WEIGHT: 157.2 LBS | SYSTOLIC BLOOD PRESSURE: 154 MMHG | DIASTOLIC BLOOD PRESSURE: 94 MMHG | HEIGHT: 68 IN | TEMPERATURE: 99.3 F | BODY MASS INDEX: 23.82 KG/M2 | HEART RATE: 67 BPM | RESPIRATION RATE: 20 BRPM | OXYGEN SATURATION: 98 %

## 2019-09-04 DIAGNOSIS — G47.09 OTHER INSOMNIA: ICD-10-CM

## 2019-09-04 DIAGNOSIS — E78.2 MIXED HYPERLIPIDEMIA: ICD-10-CM

## 2019-09-04 DIAGNOSIS — F41.9 ANXIETY: ICD-10-CM

## 2019-09-04 DIAGNOSIS — G89.29 CHRONIC BILATERAL LOW BACK PAIN WITHOUT SCIATICA: ICD-10-CM

## 2019-09-04 DIAGNOSIS — J44.1 COPD EXACERBATION (HCC): ICD-10-CM

## 2019-09-04 DIAGNOSIS — F33.1 MODERATE EPISODE OF RECURRENT MAJOR DEPRESSIVE DISORDER (HCC): ICD-10-CM

## 2019-09-04 DIAGNOSIS — E11.9 TYPE 2 DIABETES MELLITUS WITHOUT COMPLICATION, WITHOUT LONG-TERM CURRENT USE OF INSULIN (HCC): Primary | ICD-10-CM

## 2019-09-04 DIAGNOSIS — M54.50 CHRONIC BILATERAL LOW BACK PAIN WITHOUT SCIATICA: ICD-10-CM

## 2019-09-04 DIAGNOSIS — I25.5 ISCHEMIC CARDIOMYOPATHY: ICD-10-CM

## 2019-09-04 DIAGNOSIS — E11.9 DIABETES MELLITUS TYPE 2 IN NONOBESE (HCC): ICD-10-CM

## 2019-09-04 DIAGNOSIS — I10 ESSENTIAL HYPERTENSION: ICD-10-CM

## 2019-09-04 LAB — SL AMB POCT HEMOGLOBIN AIC: 7.2 (ref ?–6.5)

## 2019-09-04 PROCEDURE — 99214 OFFICE O/P EST MOD 30 MIN: CPT | Performed by: NURSE PRACTITIONER

## 2019-09-04 PROCEDURE — 83036 HEMOGLOBIN GLYCOSYLATED A1C: CPT | Performed by: NURSE PRACTITIONER

## 2019-09-04 PROCEDURE — 3045F PR MOST RECENT HEMOGLOBIN A1C LEVEL 7.0-9.0%: CPT | Performed by: NURSE PRACTITIONER

## 2019-09-04 RX ORDER — TRAZODONE HYDROCHLORIDE 100 MG/1
200 TABLET ORAL
Qty: 60 TABLET | Refills: 5 | Status: SHIPPED | OUTPATIENT
Start: 2019-09-04 | End: 2020-03-19 | Stop reason: SDUPTHER

## 2019-09-04 RX ORDER — LISINOPRIL 10 MG/1
20 TABLET ORAL 2 TIMES DAILY
Qty: 180 TABLET | Refills: 3 | Status: SHIPPED | OUTPATIENT
Start: 2019-09-04 | End: 2020-03-09 | Stop reason: SDUPTHER

## 2019-09-04 RX ORDER — ATORVASTATIN CALCIUM 20 MG/1
20 TABLET, FILM COATED ORAL
Qty: 30 TABLET | Refills: 0 | Status: SHIPPED | OUTPATIENT
Start: 2019-09-04 | End: 2019-09-11 | Stop reason: SDUPTHER

## 2019-09-04 RX ORDER — CELECOXIB 100 MG/1
100 CAPSULE ORAL 2 TIMES DAILY
Qty: 60 CAPSULE | Refills: 3 | Status: SHIPPED | OUTPATIENT
Start: 2019-09-04 | End: 2020-03-20 | Stop reason: SDUPTHER

## 2019-09-04 RX ORDER — GLIMEPIRIDE 2 MG/1
2 TABLET ORAL
Qty: 90 TABLET | Refills: 3 | Status: SHIPPED | OUTPATIENT
Start: 2019-09-04 | End: 2020-07-17 | Stop reason: SDUPTHER

## 2019-09-04 RX ORDER — ESCITALOPRAM OXALATE 20 MG/1
20 TABLET ORAL DAILY
Qty: 90 TABLET | Refills: 1 | Status: SHIPPED | OUTPATIENT
Start: 2019-09-04 | End: 2019-10-16 | Stop reason: SDUPTHER
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
## 2019-09-11 DIAGNOSIS — E78.2 MIXED HYPERLIPIDEMIA: ICD-10-CM

## 2019-09-12 RX ORDER — ATORVASTATIN CALCIUM 20 MG/1
TABLET, FILM COATED ORAL
Qty: 30 TABLET | Refills: 0 | Status: SHIPPED | OUTPATIENT
Start: 2019-09-12 | End: 2019-10-07 | Stop reason: SDUPTHER

## 2019-09-27 ENCOUNTER — TELEPHONE (OUTPATIENT)
Dept: FAMILY MEDICINE CLINIC | Facility: CLINIC | Age: 64
End: 2019-09-27

## 2019-09-28 DIAGNOSIS — I10 HYPERTENSION, UNSPECIFIED TYPE: ICD-10-CM

## 2019-09-30 RX ORDER — FUROSEMIDE 40 MG/1
40 TABLET ORAL 2 TIMES DAILY
Qty: 60 TABLET | Refills: 5 | Status: SHIPPED | OUTPATIENT
Start: 2019-09-30 | End: 2019-10-16 | Stop reason: ALTCHOICE

## 2019-10-07 DIAGNOSIS — E78.2 MIXED HYPERLIPIDEMIA: ICD-10-CM

## 2019-10-08 RX ORDER — ATORVASTATIN CALCIUM 20 MG/1
20 TABLET, FILM COATED ORAL
Qty: 30 TABLET | Refills: 0 | Status: SHIPPED | OUTPATIENT
Start: 2019-10-08 | End: 2019-10-16 | Stop reason: SDUPTHER

## 2019-10-16 ENCOUNTER — OFFICE VISIT (OUTPATIENT)
Dept: INTERNAL MEDICINE CLINIC | Facility: CLINIC | Age: 64
End: 2019-10-16
Payer: COMMERCIAL

## 2019-10-16 VITALS
BODY MASS INDEX: 23.95 KG/M2 | SYSTOLIC BLOOD PRESSURE: 150 MMHG | WEIGHT: 158 LBS | HEIGHT: 68 IN | HEART RATE: 67 BPM | TEMPERATURE: 99.4 F | OXYGEN SATURATION: 97 % | DIASTOLIC BLOOD PRESSURE: 78 MMHG

## 2019-10-16 DIAGNOSIS — F33.1 MODERATE EPISODE OF RECURRENT MAJOR DEPRESSIVE DISORDER (HCC): ICD-10-CM

## 2019-10-16 DIAGNOSIS — E78.2 MIXED HYPERLIPIDEMIA: ICD-10-CM

## 2019-10-16 DIAGNOSIS — I10 ESSENTIAL HYPERTENSION: ICD-10-CM

## 2019-10-16 DIAGNOSIS — E11.9 TYPE 2 DIABETES MELLITUS WITHOUT COMPLICATION, WITHOUT LONG-TERM CURRENT USE OF INSULIN (HCC): Primary | ICD-10-CM

## 2019-10-16 DIAGNOSIS — Z13.5 SCREENING FOR DIABETIC RETINOPATHY: ICD-10-CM

## 2019-10-16 DIAGNOSIS — Z23 NEED FOR VACCINATION: ICD-10-CM

## 2019-10-16 DIAGNOSIS — J44.1 COPD EXACERBATION (HCC): ICD-10-CM

## 2019-10-16 PROCEDURE — 90670 PCV13 VACCINE IM: CPT | Performed by: NURSE PRACTITIONER

## 2019-10-16 PROCEDURE — G0009 ADMIN PNEUMOCOCCAL VACCINE: HCPCS | Performed by: NURSE PRACTITIONER

## 2019-10-16 PROCEDURE — 90682 RIV4 VACC RECOMBINANT DNA IM: CPT | Performed by: NURSE PRACTITIONER

## 2019-10-16 PROCEDURE — G0008 ADMIN INFLUENZA VIRUS VAC: HCPCS | Performed by: NURSE PRACTITIONER

## 2019-10-16 PROCEDURE — 99214 OFFICE O/P EST MOD 30 MIN: CPT | Performed by: NURSE PRACTITIONER

## 2019-10-16 PROCEDURE — 3008F BODY MASS INDEX DOCD: CPT | Performed by: NURSE PRACTITIONER

## 2019-10-16 RX ORDER — CARVEDILOL 3.12 MG/1
3.12 TABLET ORAL 2 TIMES DAILY WITH MEALS
Qty: 180 TABLET | Refills: 3 | Status: SHIPPED | OUTPATIENT
Start: 2019-10-16 | End: 2020-03-18 | Stop reason: ALTCHOICE

## 2019-10-16 RX ORDER — ESCITALOPRAM OXALATE 20 MG/1
20 TABLET ORAL DAILY
Qty: 90 TABLET | Refills: 3 | Status: SHIPPED | OUTPATIENT
Start: 2019-10-16 | End: 2020-12-07 | Stop reason: SDUPTHER

## 2019-10-16 RX ORDER — ESCITALOPRAM OXALATE 20 MG/1
20 TABLET ORAL DAILY
Qty: 90 TABLET | Refills: 3 | Status: SHIPPED | OUTPATIENT
Start: 2019-10-16 | End: 2019-10-16 | Stop reason: SDUPTHER

## 2019-10-16 RX ORDER — ATORVASTATIN CALCIUM 20 MG/1
20 TABLET, FILM COATED ORAL
Qty: 90 TABLET | Refills: 3 | Status: SHIPPED | OUTPATIENT
Start: 2019-10-16 | End: 2020-03-18 | Stop reason: ALTCHOICE

## 2019-10-16 RX ORDER — CARVEDILOL 3.12 MG/1
3.12 TABLET ORAL 2 TIMES DAILY WITH MEALS
Qty: 60 TABLET | Refills: 0 | Status: SHIPPED | OUTPATIENT
Start: 2019-10-16 | End: 2019-10-16 | Stop reason: SDUPTHER

## 2019-10-16 RX ORDER — ATORVASTATIN CALCIUM 20 MG/1
20 TABLET, FILM COATED ORAL
Qty: 90 TABLET | Refills: 3 | Status: SHIPPED | OUTPATIENT
Start: 2019-10-16 | End: 2019-10-16 | Stop reason: SDUPTHER
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
## 2019-10-24 DIAGNOSIS — E78.2 MIXED HYPERLIPIDEMIA: ICD-10-CM

## 2019-10-24 RX ORDER — ATORVASTATIN CALCIUM 20 MG/1
20 TABLET, FILM COATED ORAL
Qty: 30 TABLET | Refills: 0 | Status: SHIPPED | OUTPATIENT
Start: 2019-10-24 | End: 2020-03-09 | Stop reason: SDUPTHER

## 2019-12-04 RX ORDER — SILDENAFIL CITRATE 20 MG/1
TABLET ORAL
Refills: 5 | COMMUNITY
Start: 2019-10-22 | End: 2020-03-09 | Stop reason: SDUPTHER

## 2020-01-15 ENCOUNTER — APPOINTMENT (OUTPATIENT)
Dept: LAB | Facility: CLINIC | Age: 65
End: 2020-01-15
Payer: COMMERCIAL

## 2020-01-15 DIAGNOSIS — E11.9 TYPE 2 DIABETES MELLITUS WITHOUT COMPLICATION, WITHOUT LONG-TERM CURRENT USE OF INSULIN (HCC): ICD-10-CM

## 2020-01-15 LAB
EST. AVERAGE GLUCOSE BLD GHB EST-MCNC: 134 MG/DL
GLUCOSE P FAST SERPL-MCNC: 130 MG/DL (ref 65–99)
HBA1C MFR BLD: 6.3 % (ref 4.2–6.3)

## 2020-01-15 PROCEDURE — 82947 ASSAY GLUCOSE BLOOD QUANT: CPT

## 2020-01-15 PROCEDURE — 36415 COLL VENOUS BLD VENIPUNCTURE: CPT

## 2020-01-15 PROCEDURE — 83036 HEMOGLOBIN GLYCOSYLATED A1C: CPT

## 2020-02-11 ENCOUNTER — HOSPITAL ENCOUNTER (EMERGENCY)
Facility: HOSPITAL | Age: 65
Discharge: HOME | End: 2020-02-11
Attending: FAMILY MEDICINE | Admitting: EMERGENCY MEDICINE
Payer: COMMERCIAL

## 2020-02-11 VITALS
HEIGHT: 68 IN | WEIGHT: 155 LBS | DIASTOLIC BLOOD PRESSURE: 93 MMHG | SYSTOLIC BLOOD PRESSURE: 196 MMHG | RESPIRATION RATE: 16 BRPM | TEMPERATURE: 98.9 F | OXYGEN SATURATION: 97 % | BODY MASS INDEX: 23.49 KG/M2 | HEART RATE: 70 BPM

## 2020-02-11 DIAGNOSIS — F33.1 MODERATE EPISODE OF RECURRENT MAJOR DEPRESSIVE DISORDER (HCC): ICD-10-CM

## 2020-02-11 DIAGNOSIS — Z20.2 EXPOSURE TO STD: Primary | ICD-10-CM

## 2020-02-11 LAB
ALBUMIN SERPL BCP-MCNC: 4.4 G/DL (ref 3.5–5.7)
ALP SERPL-CCNC: 38 U/L (ref 55–165)
ALT SERPL W P-5'-P-CCNC: 5 U/L (ref 7–52)
AMPHETAMINES SERPL QL SCN: NEGATIVE
ANION GAP SERPL CALCULATED.3IONS-SCNC: 9 MMOL/L (ref 4–13)
AST SERPL W P-5'-P-CCNC: 14 U/L (ref 13–39)
BARBITURATES UR QL: NEGATIVE
BASOPHILS # BLD AUTO: 0.1 THOUSANDS/ΜL (ref 0–0.1)
BASOPHILS NFR BLD AUTO: 1 % (ref 0–2)
BENZODIAZ UR QL: NEGATIVE
BILIRUB SERPL-MCNC: 0.7 MG/DL (ref 0.2–1)
BILIRUB UR QL STRIP: NEGATIVE
BUN SERPL-MCNC: 12 MG/DL (ref 7–25)
CALCIUM SERPL-MCNC: 9.9 MG/DL (ref 8.6–10.5)
CHLORIDE SERPL-SCNC: 98 MMOL/L (ref 98–107)
CLARITY UR: CLEAR
CO2 SERPL-SCNC: 27 MMOL/L (ref 21–31)
COCAINE UR QL: NEGATIVE
COLOR UR: YELLOW
CREAT SERPL-MCNC: 1.04 MG/DL (ref 0.7–1.3)
EOSINOPHIL # BLD AUTO: 0.2 THOUSAND/ΜL (ref 0–0.61)
EOSINOPHIL NFR BLD AUTO: 2 % (ref 0–5)
ERYTHROCYTE [DISTWIDTH] IN BLOOD BY AUTOMATED COUNT: 13.5 % (ref 11.5–14.5)
ETHANOL SERPL-MCNC: <10 MG/DL
GFR SERPL CREATININE-BSD FRML MDRD: 75 ML/MIN/1.73SQ M
GLUCOSE SERPL-MCNC: 95 MG/DL (ref 65–99)
GLUCOSE UR STRIP-MCNC: NEGATIVE MG/DL
HCT VFR BLD AUTO: 45 % (ref 42–47)
HGB BLD-MCNC: 15.2 G/DL (ref 14–18)
HGB UR QL STRIP.AUTO: NEGATIVE
KETONES UR STRIP-MCNC: NEGATIVE MG/DL
LEUKOCYTE ESTERASE UR QL STRIP: NEGATIVE
LYMPHOCYTES # BLD AUTO: 1.7 THOUSANDS/ΜL (ref 0.6–4.47)
LYMPHOCYTES NFR BLD AUTO: 20 % (ref 21–51)
MCH RBC QN AUTO: 32.4 PG (ref 26–34)
MCHC RBC AUTO-ENTMCNC: 33.8 G/DL (ref 31–37)
MCV RBC AUTO: 96 FL (ref 81–99)
METHADONE UR QL: NEGATIVE
MONOCYTES # BLD AUTO: 0.7 THOUSAND/ΜL (ref 0.17–1.22)
MONOCYTES NFR BLD AUTO: 8 % (ref 2–12)
NEUTROPHILS # BLD AUTO: 5.9 THOUSANDS/ΜL (ref 1.4–6.5)
NEUTS SEG NFR BLD AUTO: 68 % (ref 42–75)
NITRITE UR QL STRIP: NEGATIVE
OPIATES UR QL SCN: NEGATIVE
PCP UR QL: NEGATIVE
PH UR STRIP.AUTO: 6.5 [PH]
PLATELET # BLD AUTO: 245 THOUSANDS/UL (ref 149–390)
PMV BLD AUTO: 8 FL (ref 8.6–11.7)
POTASSIUM SERPL-SCNC: 4 MMOL/L (ref 3.5–5.5)
PROT SERPL-MCNC: 7.1 G/DL (ref 6.4–8.9)
PROT UR STRIP-MCNC: NEGATIVE MG/DL
RBC # BLD AUTO: 4.7 MILLION/UL (ref 4.3–5.9)
SODIUM SERPL-SCNC: 134 MMOL/L (ref 134–143)
SP GR UR STRIP.AUTO: <=1.005 (ref 1–1.03)
THC UR QL: NEGATIVE
TSH SERPL DL<=0.05 MIU/L-ACNC: 1.1 UIU/ML (ref 0.45–5.33)
UROBILINOGEN UR QL STRIP.AUTO: 0.2 E.U./DL
WBC # BLD AUTO: 8.7 THOUSAND/UL (ref 4.8–10.8)

## 2020-02-11 PROCEDURE — 80053 COMPREHEN METABOLIC PANEL: CPT | Performed by: FAMILY MEDICINE

## 2020-02-11 PROCEDURE — 99283 EMERGENCY DEPT VISIT LOW MDM: CPT | Performed by: FAMILY MEDICINE

## 2020-02-11 PROCEDURE — 81003 URINALYSIS AUTO W/O SCOPE: CPT | Performed by: FAMILY MEDICINE

## 2020-02-11 PROCEDURE — 84443 ASSAY THYROID STIM HORMONE: CPT | Performed by: FAMILY MEDICINE

## 2020-02-11 PROCEDURE — 80320 DRUG SCREEN QUANTALCOHOLS: CPT | Performed by: FAMILY MEDICINE

## 2020-02-11 PROCEDURE — 80307 DRUG TEST PRSMV CHEM ANLYZR: CPT | Performed by: FAMILY MEDICINE

## 2020-02-11 PROCEDURE — 99284 EMERGENCY DEPT VISIT MOD MDM: CPT

## 2020-02-11 PROCEDURE — 87529 HSV DNA AMP PROBE: CPT | Performed by: FAMILY MEDICINE

## 2020-02-11 PROCEDURE — 36415 COLL VENOUS BLD VENIPUNCTURE: CPT | Performed by: FAMILY MEDICINE

## 2020-02-11 PROCEDURE — 87491 CHLMYD TRACH DNA AMP PROBE: CPT | Performed by: FAMILY MEDICINE

## 2020-02-11 PROCEDURE — 85025 COMPLETE CBC W/AUTO DIFF WBC: CPT | Performed by: FAMILY MEDICINE

## 2020-02-11 PROCEDURE — 87591 N.GONORRHOEAE DNA AMP PROB: CPT | Performed by: FAMILY MEDICINE
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
## 2020-02-13 LAB
C TRACH DNA SPEC QL NAA+PROBE: NEGATIVE
N GONORRHOEA DNA SPEC QL NAA+PROBE: NEGATIVE

## 2020-02-15 DIAGNOSIS — J44.1 COPD EXACERBATION (HCC): ICD-10-CM

## 2020-02-15 LAB
HSV1 DNA SPEC QL NAA+PROBE: NEGATIVE
HSV2 DNA SPEC QL NAA+PROBE: NEGATIVE

## 2020-02-17 DIAGNOSIS — J44.1 COPD EXACERBATION (HCC): Primary | ICD-10-CM

## 2020-02-27 ENCOUNTER — TELEPHONE (OUTPATIENT)
Dept: FAMILY MEDICINE CLINIC | Facility: CLINIC | Age: 65
End: 2020-02-27

## 2020-02-27 NOTE — TELEPHONE ENCOUNTER
Called about DM eye exam, patient states he is having difficultly with transportation and is going to try and get to [facility] to get it completed

## 2020-03-09 DIAGNOSIS — N52.8 OTHER MALE ERECTILE DYSFUNCTION: Primary | ICD-10-CM

## 2020-03-09 DIAGNOSIS — E78.2 MIXED HYPERLIPIDEMIA: ICD-10-CM

## 2020-03-09 DIAGNOSIS — I10 ESSENTIAL HYPERTENSION: ICD-10-CM

## 2020-03-09 RX ORDER — LISINOPRIL 10 MG/1
20 TABLET ORAL 2 TIMES DAILY
Qty: 180 TABLET | Refills: 1 | Status: SHIPPED | OUTPATIENT
Start: 2020-03-09 | End: 2020-03-11 | Stop reason: SDUPTHER

## 2020-03-09 RX ORDER — ATORVASTATIN CALCIUM 20 MG/1
20 TABLET, FILM COATED ORAL
Qty: 30 TABLET | Refills: 5 | Status: SHIPPED | OUTPATIENT
Start: 2020-03-09 | End: 2020-08-21 | Stop reason: SDUPTHER

## 2020-03-10 RX ORDER — SILDENAFIL CITRATE 20 MG/1
20 TABLET ORAL 3 TIMES DAILY
Qty: 90 TABLET | Refills: 1 | Status: SHIPPED | OUTPATIENT
Start: 2020-03-10 | End: 2020-03-18 | Stop reason: SDUPTHER

## 2020-03-11 DIAGNOSIS — I10 ESSENTIAL HYPERTENSION: ICD-10-CM

## 2020-03-11 RX ORDER — LISINOPRIL 10 MG/1
20 TABLET ORAL 2 TIMES DAILY
Qty: 180 TABLET | Refills: 1 | Status: SHIPPED | OUTPATIENT
Start: 2020-03-11 | End: 2020-03-18 | Stop reason: SDUPTHER

## 2020-03-18 ENCOUNTER — OFFICE VISIT (OUTPATIENT)
Dept: INTERNAL MEDICINE CLINIC | Facility: CLINIC | Age: 65
End: 2020-03-18
Payer: COMMERCIAL

## 2020-03-18 VITALS
DIASTOLIC BLOOD PRESSURE: 100 MMHG | BODY MASS INDEX: 24.33 KG/M2 | OXYGEN SATURATION: 97 % | WEIGHT: 160 LBS | TEMPERATURE: 100 F | HEART RATE: 71 BPM | SYSTOLIC BLOOD PRESSURE: 180 MMHG

## 2020-03-18 DIAGNOSIS — I10 ESSENTIAL HYPERTENSION: ICD-10-CM

## 2020-03-18 DIAGNOSIS — Z12.11 SCREEN FOR COLON CANCER: ICD-10-CM

## 2020-03-18 DIAGNOSIS — J30.2 SEASONAL ALLERGIES: ICD-10-CM

## 2020-03-18 DIAGNOSIS — F41.9 ANXIETY: ICD-10-CM

## 2020-03-18 DIAGNOSIS — J44.9 CHRONIC OBSTRUCTIVE PULMONARY DISEASE, UNSPECIFIED COPD TYPE (HCC): ICD-10-CM

## 2020-03-18 DIAGNOSIS — E11.9 ENCOUNTER FOR DIABETIC FOOT EXAM (HCC): ICD-10-CM

## 2020-03-18 DIAGNOSIS — Z00.00 MEDICARE ANNUAL WELLNESS VISIT, SUBSEQUENT: Primary | ICD-10-CM

## 2020-03-18 DIAGNOSIS — J44.1 COPD EXACERBATION (HCC): ICD-10-CM

## 2020-03-18 DIAGNOSIS — Z13.6 SCREENING FOR AAA (ABDOMINAL AORTIC ANEURYSM): ICD-10-CM

## 2020-03-18 PROCEDURE — 1125F AMNT PAIN NOTED PAIN PRSNT: CPT | Performed by: NURSE PRACTITIONER

## 2020-03-18 PROCEDURE — 3044F HG A1C LEVEL LT 7.0%: CPT | Performed by: NURSE PRACTITIONER

## 2020-03-18 PROCEDURE — 4040F PNEUMOC VAC/ADMIN/RCVD: CPT | Performed by: NURSE PRACTITIONER

## 2020-03-18 PROCEDURE — 3080F DIAST BP >= 90 MM HG: CPT | Performed by: NURSE PRACTITIONER

## 2020-03-18 PROCEDURE — 99214 OFFICE O/P EST MOD 30 MIN: CPT | Performed by: NURSE PRACTITIONER

## 2020-03-18 PROCEDURE — 3077F SYST BP >= 140 MM HG: CPT | Performed by: NURSE PRACTITIONER

## 2020-03-18 PROCEDURE — 4004F PT TOBACCO SCREEN RCVD TLK: CPT | Performed by: NURSE PRACTITIONER

## 2020-03-18 PROCEDURE — 4010F ACE/ARB THERAPY RXD/TAKEN: CPT | Performed by: NURSE PRACTITIONER

## 2020-03-18 PROCEDURE — 1170F FXNL STATUS ASSESSED: CPT | Performed by: NURSE PRACTITIONER

## 2020-03-18 PROCEDURE — G0439 PPPS, SUBSEQ VISIT: HCPCS | Performed by: NURSE PRACTITIONER

## 2020-03-18 RX ORDER — LISINOPRIL 10 MG/1
40 TABLET ORAL DAILY
Qty: 90 TABLET | Refills: 3 | Status: SHIPPED | OUTPATIENT
Start: 2020-03-18 | End: 2020-03-18 | Stop reason: DRUGHIGH

## 2020-03-18 RX ORDER — LISINOPRIL 40 MG/1
40 TABLET ORAL DAILY
Qty: 90 TABLET | Refills: 3 | Status: SHIPPED | OUTPATIENT
Start: 2020-03-18 | End: 2020-05-11 | Stop reason: SDUPTHER

## 2020-03-18 RX ORDER — BUSPIRONE HYDROCHLORIDE 10 MG/1
10 TABLET ORAL 3 TIMES DAILY
Qty: 30 TABLET | Refills: 1 | Status: SHIPPED | OUTPATIENT
Start: 2020-03-18 | End: 2020-05-12

## 2020-03-18 RX ORDER — MONTELUKAST SODIUM 10 MG/1
10 TABLET ORAL
Qty: 30 TABLET | Refills: 5 | Status: SHIPPED | OUTPATIENT
Start: 2020-03-18
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
## 2020-03-19 DIAGNOSIS — G47.09 OTHER INSOMNIA: ICD-10-CM

## 2020-03-19 RX ORDER — TRAZODONE HYDROCHLORIDE 100 MG/1
200 TABLET ORAL
Qty: 60 TABLET | Refills: 0 | Status: SHIPPED | OUTPATIENT
Start: 2020-03-19 | End: 2020-03-20 | Stop reason: SDUPTHER

## 2020-03-20 DIAGNOSIS — G89.29 CHRONIC BILATERAL LOW BACK PAIN WITHOUT SCIATICA: ICD-10-CM

## 2020-03-20 DIAGNOSIS — M54.50 CHRONIC BILATERAL LOW BACK PAIN WITHOUT SCIATICA: ICD-10-CM

## 2020-03-20 DIAGNOSIS — G47.09 OTHER INSOMNIA: ICD-10-CM

## 2020-03-20 RX ORDER — CELECOXIB 100 MG/1
100 CAPSULE ORAL 2 TIMES DAILY
Qty: 60 CAPSULE | Refills: 2 | Status: SHIPPED | OUTPATIENT
Start: 2020-03-20 | End: 2020-06-10 | Stop reason: ALTCHOICE

## 2020-03-21 RX ORDER — TRAZODONE HYDROCHLORIDE 100 MG/1
200 TABLET ORAL
Qty: 60 TABLET | Refills: 2 | Status: SHIPPED | OUTPATIENT
Start: 2020-03-21 | End: 2020-03-23 | Stop reason: SDUPTHER

## 2020-03-23 DIAGNOSIS — G47.09 OTHER INSOMNIA: ICD-10-CM

## 2020-03-23 RX ORDER — TRAZODONE HYDROCHLORIDE 100 MG/1
200 TABLET ORAL
Qty: 60 TABLET | Refills: 2 | Status: SHIPPED | OUTPATIENT
Start: 2020-03-23 | End: 2020-07-04 | Stop reason: SDUPTHER

## 2020-04-13 ENCOUNTER — TELEPHONE (OUTPATIENT)
Dept: INTERNAL MEDICINE CLINIC | Facility: CLINIC | Age: 65
End: 2020-04-13

## 2020-04-13 ENCOUNTER — TELEMEDICINE (OUTPATIENT)
Dept: INTERNAL MEDICINE CLINIC | Facility: CLINIC | Age: 65
End: 2020-04-13
Payer: COMMERCIAL

## 2020-04-13 VITALS
HEART RATE: 68 BPM | DIASTOLIC BLOOD PRESSURE: 78 MMHG | SYSTOLIC BLOOD PRESSURE: 165 MMHG | WEIGHT: 155 LBS | HEIGHT: 68 IN | BODY MASS INDEX: 23.49 KG/M2 | TEMPERATURE: 96.4 F

## 2020-04-13 DIAGNOSIS — F41.9 ANXIETY: Primary | ICD-10-CM

## 2020-04-13 DIAGNOSIS — I10 ESSENTIAL HYPERTENSION: Primary | ICD-10-CM

## 2020-04-13 DIAGNOSIS — L50.0 ALLERGIC URTICARIA: ICD-10-CM

## 2020-04-13 DIAGNOSIS — I10 ESSENTIAL HYPERTENSION: ICD-10-CM

## 2020-04-13 DIAGNOSIS — T78.40XD ALLERGIC DISORDER, SUBSEQUENT ENCOUNTER: ICD-10-CM

## 2020-04-13 DIAGNOSIS — Z12.11 ENCOUNTER FOR SCREENING FOR COLORECTAL MALIGNANT NEOPLASM: ICD-10-CM

## 2020-04-13 DIAGNOSIS — Z12.12 ENCOUNTER FOR SCREENING FOR COLORECTAL MALIGNANT NEOPLASM: ICD-10-CM

## 2020-04-13 PROBLEM — T78.40XA ALLERGIC: Status: ACTIVE | Noted: 2020-04-13

## 2020-04-13 PROCEDURE — 4010F ACE/ARB THERAPY RXD/TAKEN: CPT | Performed by: NURSE PRACTITIONER

## 2020-04-13 PROCEDURE — 3077F SYST BP >= 140 MM HG: CPT | Performed by: NURSE PRACTITIONER

## 2020-04-13 PROCEDURE — 3008F BODY MASS INDEX DOCD: CPT | Performed by: NURSE PRACTITIONER

## 2020-04-13 PROCEDURE — 4004F PT TOBACCO SCREEN RCVD TLK: CPT | Performed by: NURSE PRACTITIONER

## 2020-04-13 PROCEDURE — 3078F DIAST BP <80 MM HG: CPT | Performed by: NURSE PRACTITIONER

## 2020-04-13 PROCEDURE — 4040F PNEUMOC VAC/ADMIN/RCVD: CPT | Performed by: NURSE PRACTITIONER

## 2020-04-13 PROCEDURE — 99214 OFFICE O/P EST MOD 30 MIN: CPT | Performed by: NURSE PRACTITIONER

## 2020-04-13 PROCEDURE — 3044F HG A1C LEVEL LT 7.0%: CPT | Performed by: NURSE PRACTITIONER

## 2020-04-13 RX ORDER — CARVEDILOL 12.5 MG/1
6.25 TABLET ORAL 2 TIMES DAILY WITH MEALS
Qty: 180 TABLET | Refills: 3 | Status: SHIPPED | COMMUNITY
Start: 2020-04-13 | End: 2020-06-10 | Stop reason: SDUPTHER

## 2020-04-13 RX ORDER — AMLODIPINE BESYLATE 5 MG/1
5 TABLET ORAL DAILY
Qty: 30 TABLET | Refills: 5 | Status: SHIPPED | OUTPATIENT
Start: 2020-04-13 | End: 2020-06-10 | Stop reason: SDUPTHER

## 2020-04-22 ENCOUNTER — TRANSCRIBE ORDERS (OUTPATIENT)
Dept: LAB | Facility: HOSPITAL | Age: 65
End: 2020-04-22

## 2020-04-22 DIAGNOSIS — Z12.11 SCREENING FOR COLON CANCER: Primary | ICD-10-CM

## 2020-04-27 DIAGNOSIS — R19.5 POSITIVE COLORECTAL CANCER SCREENING USING COLOGUARD TEST: Primary | ICD-10-CM

## 2020-05-11 ENCOUNTER — TELEMEDICINE (OUTPATIENT)
Dept: INTERNAL MEDICINE CLINIC | Facility: CLINIC | Age: 65
End: 2020-05-11
Payer: COMMERCIAL

## 2020-05-11 VITALS
BODY MASS INDEX: 23.79 KG/M2 | DIASTOLIC BLOOD PRESSURE: 81 MMHG | HEIGHT: 68 IN | WEIGHT: 157 LBS | SYSTOLIC BLOOD PRESSURE: 193 MMHG | TEMPERATURE: 97.5 F | HEART RATE: 67 BPM

## 2020-05-11 DIAGNOSIS — I10 ESSENTIAL HYPERTENSION: Primary | ICD-10-CM

## 2020-05-11 DIAGNOSIS — I10 ESSENTIAL HYPERTENSION: ICD-10-CM

## 2020-05-11 DIAGNOSIS — E11.00 TYPE 2 DIABETES MELLITUS WITH HYPEROSMOLARITY WITHOUT COMA, WITHOUT LONG-TERM CURRENT USE OF INSULIN (HCC): ICD-10-CM

## 2020-05-11 DIAGNOSIS — I25.5 ISCHEMIC CARDIOMYOPATHY: ICD-10-CM

## 2020-05-11 PROBLEM — Z72.0 TOBACCO USE: Status: ACTIVE | Noted: 2020-05-11

## 2020-05-11 PROBLEM — Z12.11 ENCOUNTER FOR SCREENING FOR MALIGNANT NEOPLASM OF COLON: Status: ACTIVE | Noted: 2020-05-11

## 2020-05-11 PROBLEM — J18.9 PNEUMONIA: Status: ACTIVE | Noted: 2020-05-11

## 2020-05-11 PROBLEM — Z23 NEED FOR INFLUENZA VACCINATION: Status: ACTIVE | Noted: 2020-05-11

## 2020-05-11 PROBLEM — I50.9 ACUTE CONGESTIVE HEART FAILURE (HCC): Status: ACTIVE | Noted: 2020-05-11

## 2020-05-11 PROCEDURE — 4010F ACE/ARB THERAPY RXD/TAKEN: CPT | Performed by: NURSE PRACTITIONER

## 2020-05-11 PROCEDURE — 3079F DIAST BP 80-89 MM HG: CPT | Performed by: NURSE PRACTITIONER

## 2020-05-11 PROCEDURE — 4004F PT TOBACCO SCREEN RCVD TLK: CPT | Performed by: NURSE PRACTITIONER

## 2020-05-11 PROCEDURE — 4040F PNEUMOC VAC/ADMIN/RCVD: CPT | Performed by: NURSE PRACTITIONER

## 2020-05-11 PROCEDURE — 3077F SYST BP >= 140 MM HG: CPT | Performed by: NURSE PRACTITIONER

## 2020-05-11 PROCEDURE — 99214 OFFICE O/P EST MOD 30 MIN: CPT | Performed by: NURSE PRACTITIONER

## 2020-05-11 PROCEDURE — 3008F BODY MASS INDEX DOCD: CPT | Performed by: NURSE PRACTITIONER

## 2020-05-11 PROCEDURE — 3044F HG A1C LEVEL LT 7.0%: CPT | Performed by: NURSE PRACTITIONER

## 2020-05-11 RX ORDER — LISINOPRIL 40 MG/1
40 TABLET ORAL 2 TIMES DAILY
Qty: 60 TABLET | Refills: 1 | Status: SHIPPED | OUTPATIENT
Start: 2020-05-11 | End: 2020-07-17 | Stop reason: SDUPTHER

## 2020-05-11 RX ORDER — NITROGLYCERIN 0.4 MG/1
0.4 TABLET SUBLINGUAL
Qty: 30 TABLET | Refills: 1 | Status: SHIPPED | OUTPATIENT
Start: 2020-05-11 | End: 2021-03-03

## 2020-05-12 ENCOUNTER — CLINICAL SUPPORT (OUTPATIENT)
Dept: INTERNAL MEDICINE CLINIC | Facility: CLINIC | Age: 65
End: 2020-05-12

## 2020-05-12 VITALS — TEMPERATURE: 97.1 F | SYSTOLIC BLOOD PRESSURE: 156 MMHG | DIASTOLIC BLOOD PRESSURE: 78 MMHG

## 2020-05-12 DIAGNOSIS — J30.2 SEASONAL ALLERGIES: ICD-10-CM

## 2020-05-12 DIAGNOSIS — F41.9 ANXIETY: ICD-10-CM

## 2020-05-12 DIAGNOSIS — I10 HYPERTENSION, UNSPECIFIED TYPE: Primary | ICD-10-CM

## 2020-05-12 RX ORDER — BUSPIRONE HYDROCHLORIDE 10 MG/1
10 TABLET ORAL 3 TIMES DAILY
Qty: 30 TABLET | Refills: 0 | OUTPATIENT
Start: 2020-05-12

## 2020-05-12 RX ORDER — BUSPIRONE HYDROCHLORIDE 10 MG/1
10 TABLET ORAL 3 TIMES DAILY
Qty: 30 TABLET | Refills: 1 | Status: SHIPPED | OUTPATIENT
Start: 2020-05-12 | End: 2020-07-25 | Stop reason: SDUPTHER

## 2020-06-01 ENCOUNTER — TELEMEDICINE (OUTPATIENT)
Dept: INTERNAL MEDICINE CLINIC | Facility: CLINIC | Age: 65
End: 2020-06-01
Payer: COMMERCIAL

## 2020-06-01 VITALS — WEIGHT: 156 LBS | TEMPERATURE: 98.3 F | HEIGHT: 68 IN | BODY MASS INDEX: 23.64 KG/M2

## 2020-06-01 DIAGNOSIS — Z72.0 TOBACCO USE: ICD-10-CM

## 2020-06-01 DIAGNOSIS — I10 ESSENTIAL HYPERTENSION: Primary | ICD-10-CM

## 2020-06-01 DIAGNOSIS — Z11.59 NEED FOR HEPATITIS C SCREENING TEST: ICD-10-CM

## 2020-06-01 DIAGNOSIS — I42.0 DILATED CARDIOMYOPATHY (HCC): ICD-10-CM

## 2020-06-01 DIAGNOSIS — E11.00 TYPE 2 DIABETES MELLITUS WITH HYPEROSMOLARITY WITHOUT COMA, WITHOUT LONG-TERM CURRENT USE OF INSULIN (HCC): ICD-10-CM

## 2020-06-01 PROCEDURE — 3077F SYST BP >= 140 MM HG: CPT | Performed by: NURSE PRACTITIONER

## 2020-06-01 PROCEDURE — 4040F PNEUMOC VAC/ADMIN/RCVD: CPT | Performed by: NURSE PRACTITIONER

## 2020-06-01 PROCEDURE — 3044F HG A1C LEVEL LT 7.0%: CPT | Performed by: NURSE PRACTITIONER

## 2020-06-01 PROCEDURE — 99213 OFFICE O/P EST LOW 20 MIN: CPT | Performed by: NURSE PRACTITIONER

## 2020-06-01 PROCEDURE — 3008F BODY MASS INDEX DOCD: CPT | Performed by: NURSE PRACTITIONER

## 2020-06-01 PROCEDURE — 4004F PT TOBACCO SCREEN RCVD TLK: CPT | Performed by: NURSE PRACTITIONER

## 2020-06-01 PROCEDURE — 3078F DIAST BP <80 MM HG: CPT | Performed by: NURSE PRACTITIONER

## 2020-06-01 RX ORDER — DIPHENOXYLATE HYDROCHLORIDE AND ATROPINE SULFATE 2.5; .025 MG/1; MG/1
1 TABLET ORAL DAILY
COMMUNITY

## 2020-06-10 ENCOUNTER — OFFICE VISIT (OUTPATIENT)
Dept: INTERNAL MEDICINE CLINIC | Facility: CLINIC | Age: 65
End: 2020-06-10
Payer: COMMERCIAL

## 2020-06-10 VITALS
RESPIRATION RATE: 20 BRPM | HEART RATE: 62 BPM | OXYGEN SATURATION: 98 % | HEIGHT: 68 IN | SYSTOLIC BLOOD PRESSURE: 160 MMHG | TEMPERATURE: 97.7 F | WEIGHT: 165 LBS | DIASTOLIC BLOOD PRESSURE: 100 MMHG | BODY MASS INDEX: 25.01 KG/M2

## 2020-06-10 DIAGNOSIS — I10 ESSENTIAL HYPERTENSION: ICD-10-CM

## 2020-06-10 DIAGNOSIS — I10 ESSENTIAL HYPERTENSION: Primary | ICD-10-CM

## 2020-06-10 DIAGNOSIS — Z72.0 TOBACCO USE: ICD-10-CM

## 2020-06-10 PROCEDURE — 4040F PNEUMOC VAC/ADMIN/RCVD: CPT | Performed by: NURSE PRACTITIONER

## 2020-06-10 PROCEDURE — 99214 OFFICE O/P EST MOD 30 MIN: CPT | Performed by: NURSE PRACTITIONER

## 2020-06-10 PROCEDURE — 4004F PT TOBACCO SCREEN RCVD TLK: CPT | Performed by: NURSE PRACTITIONER

## 2020-06-10 PROCEDURE — 3077F SYST BP >= 140 MM HG: CPT | Performed by: NURSE PRACTITIONER

## 2020-06-10 PROCEDURE — 3044F HG A1C LEVEL LT 7.0%: CPT | Performed by: NURSE PRACTITIONER

## 2020-06-10 PROCEDURE — 3080F DIAST BP >= 90 MM HG: CPT | Performed by: NURSE PRACTITIONER

## 2020-06-10 PROCEDURE — 3008F BODY MASS INDEX DOCD: CPT | Performed by: NURSE PRACTITIONER

## 2020-06-10 PROCEDURE — 4010F ACE/ARB THERAPY RXD/TAKEN: CPT | Performed by: NURSE PRACTITIONER

## 2020-06-10 RX ORDER — AMLODIPINE BESYLATE 10 MG/1
10 TABLET ORAL DAILY
Qty: 90 TABLET | Refills: 3 | Status: SHIPPED | OUTPATIENT
Start: 2020-06-10 | End: 2021-06-04 | Stop reason: SDUPTHER

## 2020-06-10 RX ORDER — CARVEDILOL 12.5 MG/1
12.5 TABLET ORAL 2 TIMES DAILY WITH MEALS
Qty: 90 TABLET | Refills: 3 | Status: SHIPPED | COMMUNITY
Start: 2020-06-10 | End: 2020-11-09 | Stop reason: SDUPTHER

## 2020-06-11 ENCOUNTER — APPOINTMENT (OUTPATIENT)
Dept: LAB | Facility: CLINIC | Age: 65
End: 2020-06-11
Payer: COMMERCIAL

## 2020-06-11 DIAGNOSIS — E11.00 TYPE 2 DIABETES MELLITUS WITH HYPEROSMOLARITY WITHOUT COMA, WITHOUT LONG-TERM CURRENT USE OF INSULIN (HCC): ICD-10-CM

## 2020-06-11 DIAGNOSIS — Z11.59 NEED FOR HEPATITIS C SCREENING TEST: ICD-10-CM

## 2020-06-11 LAB
EST. AVERAGE GLUCOSE BLD GHB EST-MCNC: 140 MG/DL
HBA1C MFR BLD: 6.5 %
HCV AB SER QL: NORMAL

## 2020-06-11 PROCEDURE — 86803 HEPATITIS C AB TEST: CPT

## 2020-06-11 PROCEDURE — 3044F HG A1C LEVEL LT 7.0%: CPT | Performed by: NURSE PRACTITIONER

## 2020-06-11 PROCEDURE — 83036 HEMOGLOBIN GLYCOSYLATED A1C: CPT

## 2020-06-11 PROCEDURE — 36415 COLL VENOUS BLD VENIPUNCTURE: CPT

## 2020-07-04 DIAGNOSIS — G47.09 OTHER INSOMNIA: ICD-10-CM

## 2020-07-06 RX ORDER — TRAZODONE HYDROCHLORIDE 100 MG/1
200 TABLET ORAL
Qty: 60 TABLET | Refills: 0 | Status: SHIPPED | OUTPATIENT
Start: 2020-07-06 | End: 2020-07-17 | Stop reason: SDUPTHER

## 2020-07-17 DIAGNOSIS — E11.9 TYPE 2 DIABETES MELLITUS WITHOUT COMPLICATION, WITHOUT LONG-TERM CURRENT USE OF INSULIN (HCC): ICD-10-CM

## 2020-07-17 DIAGNOSIS — G47.09 OTHER INSOMNIA: ICD-10-CM

## 2020-07-17 DIAGNOSIS — I10 ESSENTIAL HYPERTENSION: ICD-10-CM

## 2020-07-17 RX ORDER — LISINOPRIL 40 MG/1
40 TABLET ORAL 2 TIMES DAILY
Qty: 60 TABLET | Refills: 2 | Status: SHIPPED | OUTPATIENT
Start: 2020-07-17 | End: 2020-07-20 | Stop reason: SDUPTHER

## 2020-07-17 RX ORDER — GLIMEPIRIDE 2 MG/1
2 TABLET ORAL
Qty: 90 TABLET | Refills: 0 | Status: SHIPPED | OUTPATIENT
Start: 2020-07-17 | End: 2020-12-07 | Stop reason: SDUPTHER

## 2020-07-17 RX ORDER — TRAZODONE HYDROCHLORIDE 100 MG/1
200 TABLET ORAL
Qty: 60 TABLET | Refills: 0 | Status: SHIPPED | OUTPATIENT
Start: 2020-07-17 | End: 2020-09-02 | Stop reason: SDUPTHER

## 2020-07-20 DIAGNOSIS — I10 ESSENTIAL HYPERTENSION: ICD-10-CM

## 2020-07-20 RX ORDER — LISINOPRIL 40 MG/1
40 TABLET ORAL 2 TIMES DAILY
Qty: 60 TABLET | Refills: 2 | Status: SHIPPED | OUTPATIENT
Start: 2020-07-20 | End: 2020-07-22 | Stop reason: SDUPTHER

## 2020-07-22 ENCOUNTER — OFFICE VISIT (OUTPATIENT)
Dept: INTERNAL MEDICINE CLINIC | Facility: CLINIC | Age: 65
End: 2020-07-22
Payer: COMMERCIAL

## 2020-07-22 ENCOUNTER — APPOINTMENT (OUTPATIENT)
Dept: LAB | Facility: CLINIC | Age: 65
End: 2020-07-22
Payer: COMMERCIAL

## 2020-07-22 VITALS
SYSTOLIC BLOOD PRESSURE: 128 MMHG | HEIGHT: 68 IN | BODY MASS INDEX: 24.86 KG/M2 | DIASTOLIC BLOOD PRESSURE: 78 MMHG | TEMPERATURE: 97.4 F | WEIGHT: 164 LBS | RESPIRATION RATE: 18 BRPM | HEART RATE: 66 BPM | OXYGEN SATURATION: 97 %

## 2020-07-22 DIAGNOSIS — Z20.822 EXPOSURE TO COVID-19 VIRUS: Primary | ICD-10-CM

## 2020-07-22 DIAGNOSIS — N52.8 OTHER MALE ERECTILE DYSFUNCTION: ICD-10-CM

## 2020-07-22 DIAGNOSIS — E11.00 TYPE 2 DIABETES MELLITUS WITH HYPEROSMOLARITY WITHOUT COMA, WITHOUT LONG-TERM CURRENT USE OF INSULIN (HCC): ICD-10-CM

## 2020-07-22 DIAGNOSIS — Z20.822 EXPOSURE TO COVID-19 VIRUS: ICD-10-CM

## 2020-07-22 DIAGNOSIS — I10 ESSENTIAL HYPERTENSION: ICD-10-CM

## 2020-07-22 LAB — SARS-COV-2 IGG+IGM SERPL QL IA: NORMAL

## 2020-07-22 PROCEDURE — 86769 SARS-COV-2 COVID-19 ANTIBODY: CPT

## 2020-07-22 PROCEDURE — 4004F PT TOBACCO SCREEN RCVD TLK: CPT | Performed by: NURSE PRACTITIONER

## 2020-07-22 PROCEDURE — 36415 COLL VENOUS BLD VENIPUNCTURE: CPT

## 2020-07-22 PROCEDURE — 3008F BODY MASS INDEX DOCD: CPT | Performed by: NURSE PRACTITIONER

## 2020-07-22 PROCEDURE — 3074F SYST BP LT 130 MM HG: CPT | Performed by: NURSE PRACTITIONER

## 2020-07-22 PROCEDURE — 99214 OFFICE O/P EST MOD 30 MIN: CPT | Performed by: NURSE PRACTITIONER

## 2020-07-22 PROCEDURE — 1160F RVW MEDS BY RX/DR IN RCRD: CPT | Performed by: NURSE PRACTITIONER

## 2020-07-22 PROCEDURE — 4040F PNEUMOC VAC/ADMIN/RCVD: CPT | Performed by: NURSE PRACTITIONER

## 2020-07-22 PROCEDURE — 3044F HG A1C LEVEL LT 7.0%: CPT | Performed by: NURSE PRACTITIONER

## 2020-07-22 PROCEDURE — 4010F ACE/ARB THERAPY RXD/TAKEN: CPT | Performed by: NURSE PRACTITIONER

## 2020-07-22 PROCEDURE — 3078F DIAST BP <80 MM HG: CPT | Performed by: NURSE PRACTITIONER

## 2020-07-22 RX ORDER — SILDENAFIL CITRATE 20 MG/1
20 TABLET ORAL 3 TIMES DAILY
Qty: 30 TABLET | Refills: 1 | Status: SHIPPED | OUTPATIENT
Start: 2020-07-22 | End: 2020-07-22 | Stop reason: SDUPTHER

## 2020-07-22 RX ORDER — SILDENAFIL CITRATE 20 MG/1
20 TABLET ORAL 3 TIMES DAILY
Qty: 90 TABLET | Refills: 1 | Status: SHIPPED | OUTPATIENT
Start: 2020-07-22 | End: 2021-03-03 | Stop reason: SDUPTHER

## 2020-07-22 RX ORDER — LISINOPRIL 40 MG/1
40 TABLET ORAL 2 TIMES DAILY
Qty: 180 TABLET | Refills: 3 | Status: SHIPPED | OUTPATIENT
Start: 2020-07-22 | End: 2020-10-05 | Stop reason: SDUPTHER

## 2020-07-22 RX ORDER — SILDENAFIL CITRATE 20 MG/1
20 TABLET ORAL 3 TIMES DAILY
Qty: 30 TABLET | Refills: 1 | Status: CANCELLED | OUTPATIENT
Start: 2020-07-22 | End: 2020-08-21
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
## 2020-07-25 DIAGNOSIS — F41.9 ANXIETY: ICD-10-CM

## 2020-07-25 DIAGNOSIS — J30.2 SEASONAL ALLERGIES: ICD-10-CM

## 2020-07-27 RX ORDER — BUSPIRONE HYDROCHLORIDE 10 MG/1
10 TABLET ORAL 3 TIMES DAILY
Qty: 30 TABLET | Refills: 0 | Status: SHIPPED | OUTPATIENT
Start: 2020-07-27 | End: 2020-10-06 | Stop reason: SDUPTHER

## 2020-08-05 DIAGNOSIS — E11.9 DIABETES MELLITUS TYPE 2 IN NONOBESE (HCC): ICD-10-CM

## 2020-08-21 DIAGNOSIS — E78.2 MIXED HYPERLIPIDEMIA: ICD-10-CM

## 2020-08-21 RX ORDER — ATORVASTATIN CALCIUM 20 MG/1
20 TABLET, FILM COATED ORAL
Qty: 30 TABLET | Refills: 5 | Status: SHIPPED | OUTPATIENT
Start: 2020-08-21 | End: 2020-09-02 | Stop reason: SDUPTHER

## 2020-09-02 DIAGNOSIS — G47.09 OTHER INSOMNIA: ICD-10-CM

## 2020-09-02 DIAGNOSIS — E78.2 MIXED HYPERLIPIDEMIA: ICD-10-CM

## 2020-09-02 RX ORDER — ATORVASTATIN CALCIUM 20 MG/1
20 TABLET, FILM COATED ORAL
Qty: 30 TABLET | Refills: 0 | Status: SHIPPED | OUTPATIENT
Start: 2020-09-02 | End: 2021-04-20 | Stop reason: SDUPTHER

## 2020-09-02 RX ORDER — TRAZODONE HYDROCHLORIDE 100 MG/1
200 TABLET ORAL
Qty: 60 TABLET | Refills: 3 | Status: SHIPPED | OUTPATIENT
Start: 2020-09-02 | End: 2021-01-25 | Stop reason: SDUPTHER

## 2020-10-05 DIAGNOSIS — I10 ESSENTIAL HYPERTENSION: ICD-10-CM

## 2020-10-05 RX ORDER — LISINOPRIL 40 MG/1
40 TABLET ORAL 2 TIMES DAILY
Qty: 180 TABLET | Refills: 3 | Status: SHIPPED | OUTPATIENT
Start: 2020-10-05 | End: 2020-12-07 | Stop reason: SDUPTHER

## 2020-10-06 DIAGNOSIS — F41.9 ANXIETY: ICD-10-CM

## 2020-10-06 DIAGNOSIS — J30.2 SEASONAL ALLERGIES: ICD-10-CM

## 2020-10-07 RX ORDER — BUSPIRONE HYDROCHLORIDE 10 MG/1
10 TABLET ORAL 3 TIMES DAILY
Qty: 90 TABLET | Refills: 5 | Status: SHIPPED | OUTPATIENT
Start: 2020-10-07 | End: 2020-10-08 | Stop reason: SDUPTHER

## 2020-10-08 DIAGNOSIS — J30.2 SEASONAL ALLERGIES: ICD-10-CM

## 2020-10-08 DIAGNOSIS — F41.9 ANXIETY: ICD-10-CM

## 2020-10-09 RX ORDER — BUSPIRONE HYDROCHLORIDE 10 MG/1
10 TABLET ORAL 3 TIMES DAILY
Qty: 90 TABLET | Refills: 1 | Status: SHIPPED | OUTPATIENT
Start: 2020-10-09 | End: 2020-12-29 | Stop reason: SDUPTHER

## 2020-11-09 DIAGNOSIS — I10 ESSENTIAL HYPERTENSION: ICD-10-CM

## 2020-11-09 RX ORDER — CARVEDILOL 3.12 MG/1
TABLET ORAL
Qty: 60 TABLET | Refills: 5 | Status: SHIPPED | OUTPATIENT
Start: 2020-11-09 | End: 2020-12-13 | Stop reason: SDUPTHER

## 2020-12-07 DIAGNOSIS — E11.9 TYPE 2 DIABETES MELLITUS WITHOUT COMPLICATION, WITHOUT LONG-TERM CURRENT USE OF INSULIN (HCC): ICD-10-CM

## 2020-12-07 DIAGNOSIS — I10 ESSENTIAL HYPERTENSION: ICD-10-CM

## 2020-12-07 DIAGNOSIS — F33.1 MODERATE EPISODE OF RECURRENT MAJOR DEPRESSIVE DISORDER (HCC): ICD-10-CM

## 2020-12-07 RX ORDER — LISINOPRIL 40 MG/1
40 TABLET ORAL 2 TIMES DAILY
Qty: 180 TABLET | Refills: 3 | Status: SHIPPED | OUTPATIENT
Start: 2020-12-07 | End: 2021-07-01 | Stop reason: SDUPTHER

## 2020-12-07 RX ORDER — GLIMEPIRIDE 2 MG/1
2 TABLET ORAL
Qty: 90 TABLET | Refills: 3 | Status: SHIPPED | OUTPATIENT
Start: 2020-12-07 | End: 2021-07-01 | Stop reason: SDUPTHER

## 2020-12-07 RX ORDER — ESCITALOPRAM OXALATE 20 MG/1
20 TABLET ORAL DAILY
Qty: 90 TABLET | Refills: 3 | Status: SHIPPED | OUTPATIENT
Start: 2020-12-07 | End: 2021-07-01 | Stop reason: SDUPTHER

## 2020-12-13 DIAGNOSIS — I10 ESSENTIAL HYPERTENSION: ICD-10-CM

## 2020-12-14 RX ORDER — CARVEDILOL 3.12 MG/1
TABLET ORAL
Qty: 60 TABLET | Refills: 0 | Status: SHIPPED | OUTPATIENT
Start: 2020-12-14 | End: 2020-12-29 | Stop reason: SDUPTHER

## 2020-12-29 ENCOUNTER — OFFICE VISIT (OUTPATIENT)
Dept: INTERNAL MEDICINE CLINIC | Facility: CLINIC | Age: 65
End: 2020-12-29
Payer: COMMERCIAL

## 2020-12-29 VITALS
HEART RATE: 61 BPM | RESPIRATION RATE: 14 BRPM | TEMPERATURE: 99.5 F | OXYGEN SATURATION: 98 % | BODY MASS INDEX: 24.67 KG/M2 | SYSTOLIC BLOOD PRESSURE: 146 MMHG | DIASTOLIC BLOOD PRESSURE: 68 MMHG | HEIGHT: 68 IN | WEIGHT: 162.8 LBS

## 2020-12-29 DIAGNOSIS — F10.920 ALCOHOLIC INTOXICATION WITHOUT COMPLICATION (HCC): ICD-10-CM

## 2020-12-29 DIAGNOSIS — J44.1 COPD EXACERBATION (HCC): ICD-10-CM

## 2020-12-29 DIAGNOSIS — E11.9 DIABETIC EYE EXAM (HCC): Primary | ICD-10-CM

## 2020-12-29 DIAGNOSIS — F41.9 ANXIETY: ICD-10-CM

## 2020-12-29 DIAGNOSIS — J30.2 SEASONAL ALLERGIES: ICD-10-CM

## 2020-12-29 DIAGNOSIS — Z01.00 DIABETIC EYE EXAM (HCC): Primary | ICD-10-CM

## 2020-12-29 DIAGNOSIS — I10 ESSENTIAL HYPERTENSION: ICD-10-CM

## 2020-12-29 DIAGNOSIS — I42.0 DILATED CARDIOMYOPATHY (HCC): ICD-10-CM

## 2020-12-29 DIAGNOSIS — E11.00 TYPE 2 DIABETES MELLITUS WITH HYPEROSMOLARITY WITHOUT COMA, WITHOUT LONG-TERM CURRENT USE OF INSULIN (HCC): ICD-10-CM

## 2020-12-29 LAB — SL AMB POCT HEMOGLOBIN AIC: 6.3 (ref ?–6.5)

## 2020-12-29 PROCEDURE — 83036 HEMOGLOBIN GLYCOSYLATED A1C: CPT | Performed by: INTERNAL MEDICINE

## 2020-12-29 PROCEDURE — 3288F FALL RISK ASSESSMENT DOCD: CPT | Performed by: INTERNAL MEDICINE

## 2020-12-29 PROCEDURE — 99214 OFFICE O/P EST MOD 30 MIN: CPT | Performed by: INTERNAL MEDICINE

## 2020-12-29 PROCEDURE — 3077F SYST BP >= 140 MM HG: CPT | Performed by: INTERNAL MEDICINE

## 2020-12-29 PROCEDURE — 3044F HG A1C LEVEL LT 7.0%: CPT | Performed by: INTERNAL MEDICINE

## 2020-12-29 PROCEDURE — 3008F BODY MASS INDEX DOCD: CPT | Performed by: INTERNAL MEDICINE

## 2020-12-29 PROCEDURE — 3725F SCREEN DEPRESSION PERFORMED: CPT | Performed by: INTERNAL MEDICINE

## 2020-12-29 PROCEDURE — 1160F RVW MEDS BY RX/DR IN RCRD: CPT | Performed by: INTERNAL MEDICINE

## 2020-12-29 PROCEDURE — 3078F DIAST BP <80 MM HG: CPT | Performed by: INTERNAL MEDICINE

## 2020-12-29 PROCEDURE — 4004F PT TOBACCO SCREEN RCVD TLK: CPT | Performed by: INTERNAL MEDICINE

## 2020-12-29 PROCEDURE — 1101F PT FALLS ASSESS-DOCD LE1/YR: CPT | Performed by: INTERNAL MEDICINE

## 2020-12-29 RX ORDER — ALBUTEROL SULFATE 90 UG/1
2 AEROSOL, METERED RESPIRATORY (INHALATION) EVERY 4 HOURS PRN
Qty: 1 INHALER | Refills: 0 | Status: SHIPPED | OUTPATIENT
Start: 2020-12-29 | End: 2021-05-26 | Stop reason: SDUPTHER

## 2020-12-29 RX ORDER — CARVEDILOL 3.12 MG/1
TABLET ORAL
Qty: 180 TABLET | Refills: 0 | Status: SHIPPED | OUTPATIENT
Start: 2020-12-29 | End: 2021-04-20 | Stop reason: SDUPTHER

## 2020-12-29 RX ORDER — BUSPIRONE HYDROCHLORIDE 15 MG/1
15 TABLET ORAL 3 TIMES DAILY
Qty: 270 TABLET | Refills: 1 | Status: SHIPPED | OUTPATIENT
Start: 2020-12-29 | End: 2022-01-25 | Stop reason: SDUPTHER

## 2021-01-11 DIAGNOSIS — M54.50 CHRONIC BILATERAL LOW BACK PAIN WITHOUT SCIATICA: Primary | ICD-10-CM

## 2021-01-11 DIAGNOSIS — G89.29 CHRONIC BILATERAL LOW BACK PAIN WITHOUT SCIATICA: Primary | ICD-10-CM

## 2021-01-11 RX ORDER — CELECOXIB 100 MG/1
100 CAPSULE ORAL 2 TIMES DAILY
Qty: 60 CAPSULE | Refills: 5 | Status: SHIPPED | OUTPATIENT
Start: 2021-01-11 | End: 2022-01-25 | Stop reason: SDUPTHER

## 2021-01-11 RX ORDER — CELECOXIB 100 MG/1
100 CAPSULE ORAL 2 TIMES DAILY
COMMUNITY
End: 2021-01-11 | Stop reason: SDUPTHER

## 2021-01-25 DIAGNOSIS — G47.09 OTHER INSOMNIA: ICD-10-CM

## 2021-01-25 RX ORDER — TRAZODONE HYDROCHLORIDE 100 MG/1
200 TABLET ORAL
Qty: 60 TABLET | Refills: 5 | Status: SHIPPED | OUTPATIENT
Start: 2021-01-25 | End: 2021-08-04 | Stop reason: SDUPTHER

## 2021-02-22 DIAGNOSIS — E11.9 DIABETES MELLITUS TYPE 2 IN NONOBESE (HCC): ICD-10-CM

## 2021-03-03 ENCOUNTER — OFFICE VISIT (OUTPATIENT)
Dept: INTERNAL MEDICINE CLINIC | Facility: CLINIC | Age: 66
End: 2021-03-03
Payer: COMMERCIAL

## 2021-03-03 VITALS
HEIGHT: 68 IN | SYSTOLIC BLOOD PRESSURE: 124 MMHG | WEIGHT: 169.6 LBS | BODY MASS INDEX: 25.7 KG/M2 | TEMPERATURE: 99.2 F | HEART RATE: 65 BPM | OXYGEN SATURATION: 98 % | DIASTOLIC BLOOD PRESSURE: 68 MMHG | RESPIRATION RATE: 14 BRPM

## 2021-03-03 DIAGNOSIS — Z01.00 DIABETIC EYE EXAM (HCC): Primary | ICD-10-CM

## 2021-03-03 DIAGNOSIS — N52.8 OTHER MALE ERECTILE DYSFUNCTION: ICD-10-CM

## 2021-03-03 DIAGNOSIS — Z12.5 SCREENING PSA (PROSTATE SPECIFIC ANTIGEN): ICD-10-CM

## 2021-03-03 DIAGNOSIS — G47.61 PLMD (PERIODIC LIMB MOVEMENT DISORDER): ICD-10-CM

## 2021-03-03 DIAGNOSIS — I50.32 CHRONIC DIASTOLIC CONGESTIVE HEART FAILURE (HCC): ICD-10-CM

## 2021-03-03 DIAGNOSIS — F33.1 MODERATE EPISODE OF RECURRENT MAJOR DEPRESSIVE DISORDER (HCC): ICD-10-CM

## 2021-03-03 DIAGNOSIS — J44.1 COPD EXACERBATION (HCC): ICD-10-CM

## 2021-03-03 DIAGNOSIS — Z12.11 SCREENING FOR COLON CANCER: ICD-10-CM

## 2021-03-03 DIAGNOSIS — E11.00 TYPE 2 DIABETES MELLITUS WITH HYPEROSMOLARITY WITHOUT COMA, WITHOUT LONG-TERM CURRENT USE OF INSULIN (HCC): ICD-10-CM

## 2021-03-03 DIAGNOSIS — R19.5 POSITIVE COLORECTAL CANCER SCREENING USING COLOGUARD TEST: ICD-10-CM

## 2021-03-03 DIAGNOSIS — E11.9 DIABETIC EYE EXAM (HCC): Primary | ICD-10-CM

## 2021-03-03 PROCEDURE — 99214 OFFICE O/P EST MOD 30 MIN: CPT | Performed by: INTERNAL MEDICINE

## 2021-03-03 PROCEDURE — 3074F SYST BP LT 130 MM HG: CPT | Performed by: INTERNAL MEDICINE

## 2021-03-03 PROCEDURE — 1160F RVW MEDS BY RX/DR IN RCRD: CPT | Performed by: INTERNAL MEDICINE

## 2021-03-03 PROCEDURE — 4004F PT TOBACCO SCREEN RCVD TLK: CPT | Performed by: INTERNAL MEDICINE

## 2021-03-03 PROCEDURE — 3078F DIAST BP <80 MM HG: CPT | Performed by: INTERNAL MEDICINE

## 2021-03-03 PROCEDURE — 3008F BODY MASS INDEX DOCD: CPT | Performed by: INTERNAL MEDICINE

## 2021-03-03 RX ORDER — SILDENAFIL CITRATE 20 MG/1
20 TABLET ORAL DAILY PRN
Qty: 30 TABLET | Refills: 1 | Status: SHIPPED | OUTPATIENT
Start: 2021-03-03 | End: 2021-07-13 | Stop reason: SDUPTHER

## 2021-03-03 RX ORDER — ROPINIROLE 0.5 MG/1
0.5 TABLET, FILM COATED ORAL
Qty: 30 TABLET | Refills: 5 | Status: SHIPPED | OUTPATIENT
Start: 2021-03-03 | End: 2021-09-01

## 2021-03-03 RX ORDER — SILDENAFIL CITRATE 20 MG/1
20 TABLET ORAL DAILY PRN
Qty: 30 TABLET | Refills: 1 | Status: SHIPPED | OUTPATIENT
Start: 2021-03-03 | End: 2021-03-03 | Stop reason: SDUPTHER

## 2021-03-03 NOTE — PROGRESS NOTES
Diabetic Foot Exam    Patient's shoes and socks removed  Right Foot/Ankle   Right Foot Inspection  Skin Exam: skin normal and skin intact no dry skin, no warmth, no callus, no erythema, no maceration, no abnormal color, no pre-ulcer, no ulcer and no callus                          Toe Exam: ROM and strength within normal limits no right toe deformity  Sensory       Monofilament testing: intact  Vascular  Capillary refills: < 3 seconds  The right DP pulse is 2+  The right PT pulse is 2+  Left Foot/Ankle  Left Foot Inspection  Skin Exam: skin normal and skin intactno dry skin, no warmth, no erythema, no maceration, normal color, no pre-ulcer, no ulcer and no callus                         Toe Exam: ROM and strength within normal limits                   Sensory       Monofilament: intact  Vascular  Capillary refills: < 3 seconds  The left DP pulse is 2+  The left PT pulse is 2+  Assign Risk Category:  No deformity present; No loss of protective sensation;  No weak pulses       Risk: 0
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
## 2021-03-10 DIAGNOSIS — Z23 ENCOUNTER FOR IMMUNIZATION: ICD-10-CM

## 2021-04-20 DIAGNOSIS — I10 ESSENTIAL HYPERTENSION: ICD-10-CM

## 2021-04-20 DIAGNOSIS — E78.2 MIXED HYPERLIPIDEMIA: ICD-10-CM

## 2021-04-20 RX ORDER — CARVEDILOL 3.12 MG/1
TABLET ORAL
Qty: 180 TABLET | Refills: 1 | Status: SHIPPED | OUTPATIENT
Start: 2021-04-20 | End: 2022-01-25 | Stop reason: SDUPTHER

## 2021-04-20 RX ORDER — ATORVASTATIN CALCIUM 20 MG/1
20 TABLET, FILM COATED ORAL
Qty: 90 TABLET | Refills: 1 | Status: SHIPPED | OUTPATIENT
Start: 2021-04-20 | End: 2021-11-02 | Stop reason: SDUPTHER

## 2021-05-06 LAB
LEFT EYE DIABETIC RETINOPATHY: NORMAL
RIGHT EYE DIABETIC RETINOPATHY: NORMAL

## 2021-05-19 ENCOUNTER — VBI (OUTPATIENT)
Dept: ADMINISTRATIVE | Facility: OTHER | Age: 66
End: 2021-05-19

## 2021-05-26 DIAGNOSIS — J44.1 COPD EXACERBATION (HCC): ICD-10-CM

## 2021-05-26 RX ORDER — ALBUTEROL SULFATE 90 UG/1
2 AEROSOL, METERED RESPIRATORY (INHALATION) EVERY 4 HOURS PRN
Qty: 6.7 G | Refills: 5 | Status: SHIPPED | OUTPATIENT
Start: 2021-05-26 | End: 2021-11-22

## 2021-05-27 ENCOUNTER — VBI (OUTPATIENT)
Dept: ADMINISTRATIVE | Facility: OTHER | Age: 66
End: 2021-05-27

## 2021-05-27 NOTE — TELEPHONE ENCOUNTER
Upon review of the In Basket request we were able to locate, review, and update the patient chart as requested for Diabetic Eye Exam     Any additional questions or concerns should be emailed to the Practice Liaisons via [email or web address] email, please do not reply via In Basket      Thank you  Kee Figueroa

## 2021-06-04 DIAGNOSIS — I10 ESSENTIAL HYPERTENSION: ICD-10-CM

## 2021-06-04 RX ORDER — AMLODIPINE BESYLATE 10 MG/1
10 TABLET ORAL DAILY
Qty: 90 TABLET | Refills: 3 | Status: SHIPPED | OUTPATIENT
Start: 2021-06-04 | End: 2022-01-25 | Stop reason: SDUPTHER

## 2021-07-01 DIAGNOSIS — F33.1 MODERATE EPISODE OF RECURRENT MAJOR DEPRESSIVE DISORDER (HCC): ICD-10-CM

## 2021-07-01 DIAGNOSIS — E11.9 TYPE 2 DIABETES MELLITUS WITHOUT COMPLICATION, WITHOUT LONG-TERM CURRENT USE OF INSULIN (HCC): ICD-10-CM

## 2021-07-01 DIAGNOSIS — I10 ESSENTIAL HYPERTENSION: ICD-10-CM

## 2021-07-01 DIAGNOSIS — E11.9 DIABETES MELLITUS TYPE 2 IN NONOBESE (HCC): ICD-10-CM

## 2021-07-01 PROCEDURE — 4010F ACE/ARB THERAPY RXD/TAKEN: CPT | Performed by: INTERNAL MEDICINE

## 2021-07-01 RX ORDER — LISINOPRIL 40 MG/1
40 TABLET ORAL 2 TIMES DAILY
Qty: 180 TABLET | Refills: 0 | Status: SHIPPED | OUTPATIENT
Start: 2021-07-01 | End: 2022-01-25 | Stop reason: SDUPTHER

## 2021-07-01 RX ORDER — GLIMEPIRIDE 2 MG/1
2 TABLET ORAL
Qty: 90 TABLET | Refills: 0 | Status: SHIPPED | OUTPATIENT
Start: 2021-07-01 | End: 2021-12-04 | Stop reason: SDUPTHER

## 2021-07-01 RX ORDER — ESCITALOPRAM OXALATE 20 MG/1
20 TABLET ORAL DAILY
Qty: 90 TABLET | Refills: 0 | Status: SHIPPED | OUTPATIENT
Start: 2021-07-01 | End: 2021-12-04 | Stop reason: SDUPTHER

## 2021-07-13 ENCOUNTER — OFFICE VISIT (OUTPATIENT)
Dept: INTERNAL MEDICINE CLINIC | Facility: CLINIC | Age: 66
End: 2021-07-13
Payer: COMMERCIAL

## 2021-07-13 VITALS
HEART RATE: 60 BPM | OXYGEN SATURATION: 95 % | WEIGHT: 174.8 LBS | RESPIRATION RATE: 14 BRPM | HEIGHT: 68 IN | DIASTOLIC BLOOD PRESSURE: 64 MMHG | BODY MASS INDEX: 26.49 KG/M2 | SYSTOLIC BLOOD PRESSURE: 134 MMHG | TEMPERATURE: 98.3 F

## 2021-07-13 DIAGNOSIS — E11.42 DIABETIC POLYNEUROPATHY ASSOCIATED WITH TYPE 2 DIABETES MELLITUS (HCC): ICD-10-CM

## 2021-07-13 DIAGNOSIS — J44.1 COPD EXACERBATION (HCC): ICD-10-CM

## 2021-07-13 DIAGNOSIS — I10 ESSENTIAL HYPERTENSION: ICD-10-CM

## 2021-07-13 DIAGNOSIS — Z12.11 SCREENING FOR COLON CANCER: ICD-10-CM

## 2021-07-13 DIAGNOSIS — F10.920 ALCOHOLIC INTOXICATION WITHOUT COMPLICATION (HCC): ICD-10-CM

## 2021-07-13 DIAGNOSIS — N52.8 OTHER MALE ERECTILE DYSFUNCTION: ICD-10-CM

## 2021-07-13 DIAGNOSIS — E11.9 TYPE 2 DIABETES MELLITUS WITHOUT COMPLICATION, WITHOUT LONG-TERM CURRENT USE OF INSULIN (HCC): ICD-10-CM

## 2021-07-13 DIAGNOSIS — Z00.00 MEDICARE ANNUAL WELLNESS VISIT, SUBSEQUENT: Primary | ICD-10-CM

## 2021-07-13 DIAGNOSIS — Z72.0 TOBACCO USE: ICD-10-CM

## 2021-07-13 DIAGNOSIS — Z12.5 SCREENING PSA (PROSTATE SPECIFIC ANTIGEN): ICD-10-CM

## 2021-07-13 LAB — SL AMB POCT HEMOGLOBIN AIC: 7.3 (ref ?–6.5)

## 2021-07-13 PROCEDURE — 99214 OFFICE O/P EST MOD 30 MIN: CPT | Performed by: INTERNAL MEDICINE

## 2021-07-13 PROCEDURE — 3075F SYST BP GE 130 - 139MM HG: CPT | Performed by: INTERNAL MEDICINE

## 2021-07-13 PROCEDURE — 1170F FXNL STATUS ASSESSED: CPT | Performed by: INTERNAL MEDICINE

## 2021-07-13 PROCEDURE — 83036 HEMOGLOBIN GLYCOSYLATED A1C: CPT | Performed by: INTERNAL MEDICINE

## 2021-07-13 PROCEDURE — 3051F HG A1C>EQUAL 7.0%<8.0%: CPT | Performed by: INTERNAL MEDICINE

## 2021-07-13 PROCEDURE — G0439 PPPS, SUBSEQ VISIT: HCPCS | Performed by: INTERNAL MEDICINE

## 2021-07-13 PROCEDURE — 1160F RVW MEDS BY RX/DR IN RCRD: CPT | Performed by: INTERNAL MEDICINE

## 2021-07-13 PROCEDURE — 3008F BODY MASS INDEX DOCD: CPT | Performed by: INTERNAL MEDICINE

## 2021-07-13 PROCEDURE — 1125F AMNT PAIN NOTED PAIN PRSNT: CPT | Performed by: INTERNAL MEDICINE

## 2021-07-13 PROCEDURE — 1101F PT FALLS ASSESS-DOCD LE1/YR: CPT | Performed by: INTERNAL MEDICINE

## 2021-07-13 PROCEDURE — 3078F DIAST BP <80 MM HG: CPT | Performed by: INTERNAL MEDICINE

## 2021-07-13 PROCEDURE — 3288F FALL RISK ASSESSMENT DOCD: CPT | Performed by: INTERNAL MEDICINE

## 2021-07-13 PROCEDURE — 3725F SCREEN DEPRESSION PERFORMED: CPT | Performed by: INTERNAL MEDICINE

## 2021-07-13 RX ORDER — SILDENAFIL CITRATE 20 MG/1
20 TABLET ORAL DAILY PRN
Qty: 30 TABLET | Refills: 5 | Status: SHIPPED | OUTPATIENT
Start: 2021-07-13 | End: 2022-01-25 | Stop reason: SDUPTHER

## 2021-07-13 RX ORDER — VARENICLINE TARTRATE 25 MG
KIT ORAL
Qty: 53 TABLET | Refills: 0 | Status: SHIPPED | OUTPATIENT
Start: 2021-07-13

## 2021-07-13 RX ORDER — GABAPENTIN 100 MG/1
100 CAPSULE ORAL
Qty: 30 CAPSULE | Refills: 5 | Status: SHIPPED | OUTPATIENT
Start: 2021-07-13 | End: 2022-01-25 | Stop reason: SDUPTHER
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
## 2021-07-13 NOTE — PATIENT INSTRUCTIONS
Medicare Preventive Visit Patient Instructions  Thank you for completing your Welcome to Medicare Visit or Medicare Annual Wellness Visit today  Your next wellness visit will be due in one year (7/14/2022)  The screening/preventive services that you may require over the next 5-10 years are detailed below  Some tests may not apply to you based off risk factors and/or age  Screening tests ordered at today's visit but not completed yet may show as past due  Also, please note that scanned in results may not display below  Preventive Screenings:  Service Recommendations Previous Testing/Comments   Colorectal Cancer Screening  · Colonoscopy    · Fecal Occult Blood Test (FOBT)/Fecal Immunochemical Test (FIT)  · Fecal DNA/Cologuard Test  · Flexible Sigmoidoscopy Age: 54-65 years old   Colonoscopy: every 10 years (May be performed more frequently if at higher risk)  OR  FOBT/FIT: every 1 year  OR  Cologuard: every 3 years  OR  Sigmoidoscopy: every 5 years  Screening may be recommended earlier than age 48 if at higher risk for colorectal cancer  Also, an individualized decision between you and your healthcare provider will decide whether screening between the ages of 74-80 would be appropriate   Colonoscopy: Not on file  FOBT/FIT: Not on file  Cologuard: 04/14/2020  Sigmoidoscopy: Not on file    Screening Current     Prostate Cancer Screening Individualized decision between patient and health care provider in men between ages of 53-78   Medicare will cover every 12 months beginning on the day after your 50th birthday PSA: 2 5 ng/mL           Hepatitis C Screening Once for adults born between 1945 and 1965  More frequently in patients at high risk for Hepatitis C Hep C Antibody: 06/11/2020    Screening Current   Diabetes Screening 1-2 times per year if you're at risk for diabetes or have pre-diabetes Fasting glucose: 130 mg/dL   A1C: 6 3    Screening Not Indicated  History Diabetes   Cholesterol Screening Once every 5 years if you don't have a lipid disorder  May order more often based on risk factors  Lipid panel: 12/05/2016    Screening Not Indicated  History Lipid Disorder      Other Preventive Screenings Covered by Medicare:  1  Abdominal Aortic Aneurysm (AAA) Screening: covered once if your at risk  You're considered to be at risk if you have a family history of AAA or a male between the age of 73-68 who smoking at least 100 cigarettes in your lifetime  2  Lung Cancer Screening: covers low dose CT scan once per year if you meet all of the following conditions: (1) Age 50-69; (2) No signs or symptoms of lung cancer; (3) Current smoker or have quit smoking within the last 15 years; (4) You have a tobacco smoking history of at least 30 pack years (packs per day x number of years you smoked); (5) You get a written order from a healthcare provider  3  Glaucoma Screening: covered annually if you're considered high risk: (1) You have diabetes OR (2) Family history of glaucoma OR (3)  aged 48 and older OR (3)  American aged 72 and older  3  Osteoporosis Screening: covered every 2 years if you meet one of the following conditions: (1) Have a vertebral abnormality; (2) On glucocorticoid therapy for more than 3 months; (3) Have primary hyperparathyroidism; (4) On osteoporosis medications and need to assess response to drug therapy  5  HIV Screening: covered annually if you're between the age of 12-76  Also covered annually if you are younger than 13 and older than 72 with risk factors for HIV infection  For pregnant patients, it is covered up to 3 times per pregnancy      Immunizations:  Immunization Recommendations   Influenza Vaccine Annual influenza vaccination during flu season is recommended for all persons aged >= 6 months who do not have contraindications   Pneumococcal Vaccine (Prevnar and Pneumovax)  * Prevnar = PCV13  * Pneumovax = PPSV23 Adults 25-60 years old: 1-3 doses may be recommended based on certain risk factors  Adults 72 years old: Prevnar (PCV13) vaccine recommended followed by Pneumovax (PPSV23) vaccine  If already received PPSV23 since turning 65, then PCV13 recommended at least one year after PPSV23 dose  Hepatitis B Vaccine 3 dose series if at intermediate or high risk (ex: diabetes, end stage renal disease, liver disease)   Tetanus (Td) Vaccine - COST NOT COVERED BY MEDICARE PART B Following completion of primary series, a booster dose should be given every 10 years to maintain immunity against tetanus  Td may also be given as tetanus wound prophylaxis  Tdap Vaccine - COST NOT COVERED BY MEDICARE PART B Recommended at least once for all adults  For pregnant patients, recommended with each pregnancy  Shingles Vaccine (Shingrix) - COST NOT COVERED BY MEDICARE PART B  2 shot series recommended in those aged 48 and above     Health Maintenance Due:      Topic Date Due    Lung Cancer Screening  Never done    Colorectal Cancer Screening  04/14/2023    Hepatitis C Screening  Completed     Immunizations Due:      Topic Date Due    DTaP,Tdap,and Td Vaccines (1 - Tdap) 11/04/1975    Pneumococcal Vaccine: 65+ Years (1 of 2 - PPSV23) 12/11/2019    Influenza Vaccine (1) 09/01/2021     Advance Directives   What are advance directives? Advance directives are legal documents that state your wishes and plans for medical care  These plans are made ahead of time in case you lose your ability to make decisions for yourself  Advance directives can apply to any medical decision, such as the treatments you want, and if you want to donate organs  What are the types of advance directives? There are many types of advance directives, and each state has rules about how to use them  You may choose a combination of any of the following:  · Living will: This is a written record of the treatment you want   You can also choose which treatments you do not want, which to limit, and which to stop at a certain time  This includes surgery, medicine, IV fluid, and tube feedings  · Durable power of  for healthcare [facility]): This is a written record that states who you want to make healthcare choices for you when you are unable to make them for yourself  This person, called a proxy, is usually a family member or a friend  You may choose more than 1 proxy  · Do not resuscitate (DNR) order:  A DNR order is used in case your heart stops beating or you stop breathing  It is a request not to have certain forms of treatment, such as CPR  A DNR order may be included in other types of advance directives  · Medical directive: This covers the care that you want if you are in a coma, near death, or unable to make decisions for yourself  You can list the treatments you want for each condition  Treatment may include pain medicine, surgery, blood transfusions, dialysis, IV or tube feedings, and a ventilator (breathing machine)  · Values history: This document has questions about your views, beliefs, and how you feel and think about life  This information can help others choose the care that you would choose  Why are advance directives important? An advance directive helps you control your care  Although spoken wishes may be used, it is better to have your wishes written down  Spoken wishes can be misunderstood, or not followed  Treatments may be given even if you do not want them  An advance directive may make it easier for your family to make difficult choices about your care  Cigarette Smoking and Your Health   Risks to your health if you smoke:  Nicotine and other chemicals found in tobacco damage every cell in your body  Even if you are a light smoker, you have an increased risk for cancer, heart disease, and lung disease  If you are pregnant or have diabetes, smoking increases your risk for complications     Benefits to your health if you stop smoking:   · You decrease respiratory symptoms such as coughing, wheezing, and shortness of breath  · You reduce your risk for cancers of the lung, mouth, throat, kidney, bladder, pancreas, stomach, and cervix  If you already have cancer, you increase the benefits of chemotherapy  You also reduce your risk for cancer returning or a second cancer from developing  · You reduce your risk for heart disease, blood clots, heart attack, and stroke  · You reduce your risk for lung infections, and diseases such as pneumonia, asthma, chronic bronchitis, and emphysema  · Your circulation improves  More oxygen can be delivered to your body  If you have diabetes, you lower your risk for complications, such as kidney, artery, and eye diseases  You also lower your risk for nerve damage  Nerve damage can lead to amputations, poor vision, and blindness  · You improve your body's ability to heal and to fight infections  For more information and support to stop smoking:   · [employer]  Phone: 9- 154 - [phone]  Web Address: [employer]  Weight Management   Why it is important to manage your weight:  Being overweight increases your risk of health conditions such as heart disease, high blood pressure, type 2 diabetes, and certain types of cancer  It can also increase your risk for osteoarthritis, sleep apnea, and other respiratory problems  Aim for a slow, steady weight loss  Even a small amount of weight loss can lower your risk of health problems  How to lose weight safely:  A safe and healthy way to lose weight is to eat fewer calories and get regular exercise  You can lose up about 1 pound a week by decreasing the number of calories you eat by 500 calories each day  Healthy meal plan for weight management:  A healthy meal plan includes a variety of foods, contains fewer calories, and helps you stay healthy  A healthy meal plan includes the following:  · Eat whole-grain foods more often  A healthy meal plan should contain fiber   Fiber is the part of grains, fruits, and vegetables that is not broken down by your body  Whole-grain foods are healthy and provide extra fiber in your diet  Some examples of whole-grain foods are whole-wheat breads and pastas, oatmeal, brown rice, and bulgur  · Eat a variety of vegetables every day  Include dark, leafy greens such as spinach, kale, catalina greens, and mustard greens  Eat yellow and orange vegetables such as carrots, sweet potatoes, and winter squash  · Eat a variety of fruits every day  Choose fresh or canned fruit (canned in its own juice or light syrup) instead of juice  Fruit juice has very little or no fiber  · Eat low-fat dairy foods  Drink fat-free (skim) milk or 1% milk  Eat fat-free yogurt and low-fat cottage cheese  Try low-fat cheeses such as mozzarella and other reduced-fat cheeses  · Choose meat and other protein foods that are low in fat  Choose beans or other legumes such as split peas or lentils  Choose fish, skinless poultry (chicken or turkey), or lean cuts of red meat (beef or pork)  Before you cook meat or poultry, cut off any visible fat  · Use less fat and oil  Try baking foods instead of frying them  Add less fat, such as margarine, sour cream, regular salad dressing and mayonnaise to foods  Eat fewer high-fat foods  Some examples of high-fat foods include french fries, doughnuts, ice cream, and cakes  · Eat fewer sweets  Limit foods and drinks that are high in sugar  This includes candy, cookies, regular soda, and sweetened drinks  Exercise:  Exercise at least 30 minutes per day on most days of the week  Some examples of exercise include walking, biking, dancing, and swimming  You can also fit in more physical activity by taking the stairs instead of the elevator or parking farther away from stores  Ask your healthcare provider about the best exercise plan for you  © Copyright [employer] 2018 Information is for End User's use only and may not be sold, redistributed or otherwise used for commercial purposes  All illustrations and images included in CareNotes® are the copyrighted property of A [employer] M , Inc  or Renetta Galicia
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
## 2021-08-04 DIAGNOSIS — G47.09 OTHER INSOMNIA: ICD-10-CM

## 2021-08-05 RX ORDER — TRAZODONE HYDROCHLORIDE 100 MG/1
200 TABLET ORAL
Qty: 60 TABLET | Refills: 0 | Status: SHIPPED | OUTPATIENT
Start: 2021-08-05 | End: 2021-09-04 | Stop reason: SDUPTHER

## 2021-09-04 DIAGNOSIS — G47.09 OTHER INSOMNIA: ICD-10-CM

## 2021-09-07 RX ORDER — TRAZODONE HYDROCHLORIDE 100 MG/1
200 TABLET ORAL
Qty: 60 TABLET | Refills: 1 | Status: SHIPPED | OUTPATIENT
Start: 2021-09-07 | End: 2021-11-02 | Stop reason: SDUPTHER

## 2021-10-05 DIAGNOSIS — G47.61 PLMD (PERIODIC LIMB MOVEMENT DISORDER): ICD-10-CM

## 2021-10-05 RX ORDER — ROPINIROLE 0.5 MG/1
0.5 TABLET, FILM COATED ORAL
Qty: 90 TABLET | Refills: 0 | Status: SHIPPED | OUTPATIENT
Start: 2021-10-05 | End: 2022-01-25 | Stop reason: SDUPTHER

## 2021-11-02 DIAGNOSIS — G47.09 OTHER INSOMNIA: ICD-10-CM

## 2021-11-02 DIAGNOSIS — E78.2 MIXED HYPERLIPIDEMIA: ICD-10-CM

## 2021-11-02 RX ORDER — TRAZODONE HYDROCHLORIDE 100 MG/1
200 TABLET ORAL
Qty: 60 TABLET | Refills: 0 | Status: SHIPPED | OUTPATIENT
Start: 2021-11-02 | End: 2021-12-04 | Stop reason: SDUPTHER

## 2021-11-02 RX ORDER — ATORVASTATIN CALCIUM 20 MG/1
20 TABLET, FILM COATED ORAL
Qty: 90 TABLET | Refills: 0 | Status: SHIPPED | OUTPATIENT
Start: 2021-11-02 | End: 2022-01-25 | Stop reason: SDUPTHER

## 2021-12-04 DIAGNOSIS — F33.1 MODERATE EPISODE OF RECURRENT MAJOR DEPRESSIVE DISORDER (HCC): ICD-10-CM

## 2021-12-04 DIAGNOSIS — G47.09 OTHER INSOMNIA: ICD-10-CM

## 2021-12-04 DIAGNOSIS — E11.9 TYPE 2 DIABETES MELLITUS WITHOUT COMPLICATION, WITHOUT LONG-TERM CURRENT USE OF INSULIN (HCC): ICD-10-CM

## 2021-12-06 RX ORDER — GLIMEPIRIDE 2 MG/1
2 TABLET ORAL
Qty: 90 TABLET | Refills: 0 | Status: SHIPPED | OUTPATIENT
Start: 2021-12-06 | End: 2022-04-25 | Stop reason: SDUPTHER

## 2021-12-06 RX ORDER — ESCITALOPRAM OXALATE 20 MG/1
20 TABLET ORAL DAILY
Qty: 90 TABLET | Refills: 0 | Status: SHIPPED | OUTPATIENT
Start: 2021-12-06 | End: 2022-01-25 | Stop reason: SDUPTHER

## 2021-12-06 RX ORDER — TRAZODONE HYDROCHLORIDE 100 MG/1
200 TABLET ORAL
Qty: 60 TABLET | Refills: 0 | Status: SHIPPED | OUTPATIENT
Start: 2021-12-06 | End: 2022-01-05 | Stop reason: SDUPTHER

## 2021-12-23 DIAGNOSIS — E11.9 DIABETES MELLITUS TYPE 2 IN NONOBESE (HCC): ICD-10-CM

## 2022-01-05 DIAGNOSIS — G47.09 OTHER INSOMNIA: ICD-10-CM

## 2022-01-05 RX ORDER — TRAZODONE HYDROCHLORIDE 100 MG/1
200 TABLET ORAL
Qty: 60 TABLET | Refills: 0 | Status: SHIPPED | OUTPATIENT
Start: 2022-01-05 | End: 2022-01-25 | Stop reason: SDUPTHER

## 2022-01-12 ENCOUNTER — RA CDI HCC (OUTPATIENT)
Dept: OTHER | Facility: HOSPITAL | Age: 67
End: 2022-01-12

## 2022-01-12 NOTE — PROGRESS NOTES
[facility]  coding opportunities          Number of diagnosis code(s) already on the problem list added to  fla     Chart Reviewed * (Number of) Inbasket suggestions sent to Provider: 1     Problem listed updated  Provider Accepted, (number of) suggestions accepted: 1         Number of suggestions used: 6         Patients insurance company: [facility]     Visit status: Patient arrived for their scheduled appointment        [place] [id number]  coding opportunities     DX: E11 42 Type 2 diabetes mellitus with diabetic polyneuropathy  Assessed on 2021    DX: E11 9 Type 2 diabetes mellitus- on problem list  DX: J44 1 COPD exacerbation- on problem list  DX: W03 62 Chronic diastolic congestive heart failure- on problem list  DX: Y73 090 Alcoholic intoxication without complication- on problem list  DX: F33 1 Moderate episode of recurrent depressive disorder- on problem list    If this is correct, please document and assess at your next visit on 2022         Number of diagnosis code(s) already on the problem list added to  fla     Chart Reviewed * (Number of) Inbasket suggestions sent to Provider: 1                  Patients insurance company: [facility]

## 2022-01-13 PROBLEM — E11.42 TYPE 2 DIABETES MELLITUS WITH DIABETIC POLYNEUROPATHY (HCC): Status: ACTIVE | Noted: 2022-01-13

## 2022-01-25 ENCOUNTER — OFFICE VISIT (OUTPATIENT)
Dept: INTERNAL MEDICINE CLINIC | Facility: CLINIC | Age: 67
End: 2022-01-25
Payer: COMMERCIAL

## 2022-01-25 VITALS
SYSTOLIC BLOOD PRESSURE: 144 MMHG | OXYGEN SATURATION: 96 % | TEMPERATURE: 99.2 F | HEART RATE: 69 BPM | RESPIRATION RATE: 12 BRPM | WEIGHT: 172.4 LBS | HEIGHT: 68 IN | DIASTOLIC BLOOD PRESSURE: 80 MMHG | BODY MASS INDEX: 26.13 KG/M2

## 2022-01-25 DIAGNOSIS — F33.1 MODERATE EPISODE OF RECURRENT MAJOR DEPRESSIVE DISORDER (HCC): ICD-10-CM

## 2022-01-25 DIAGNOSIS — I10 ESSENTIAL HYPERTENSION: ICD-10-CM

## 2022-01-25 DIAGNOSIS — F41.9 ANXIETY: ICD-10-CM

## 2022-01-25 DIAGNOSIS — I50.23 ACUTE ON CHRONIC SYSTOLIC (CONGESTIVE) HEART FAILURE (HCC): ICD-10-CM

## 2022-01-25 DIAGNOSIS — F10.920 ALCOHOLIC INTOXICATION WITHOUT COMPLICATION (HCC): ICD-10-CM

## 2022-01-25 DIAGNOSIS — E11.42 TYPE 2 DIABETES MELLITUS WITH DIABETIC POLYNEUROPATHY, WITHOUT LONG-TERM CURRENT USE OF INSULIN (HCC): ICD-10-CM

## 2022-01-25 DIAGNOSIS — M54.50 CHRONIC BILATERAL LOW BACK PAIN WITHOUT SCIATICA: ICD-10-CM

## 2022-01-25 DIAGNOSIS — Z12.11 SCREENING FOR COLON CANCER: ICD-10-CM

## 2022-01-25 DIAGNOSIS — J30.2 SEASONAL ALLERGIES: ICD-10-CM

## 2022-01-25 DIAGNOSIS — G89.29 CHRONIC BILATERAL LOW BACK PAIN WITHOUT SCIATICA: ICD-10-CM

## 2022-01-25 DIAGNOSIS — E11.00 TYPE 2 DIABETES MELLITUS WITH HYPEROSMOLARITY WITHOUT COMA, WITHOUT LONG-TERM CURRENT USE OF INSULIN (HCC): Primary | ICD-10-CM

## 2022-01-25 DIAGNOSIS — G47.61 PLMD (PERIODIC LIMB MOVEMENT DISORDER): ICD-10-CM

## 2022-01-25 DIAGNOSIS — G47.09 OTHER INSOMNIA: ICD-10-CM

## 2022-01-25 DIAGNOSIS — E11.9 DIABETES MELLITUS TYPE 2 IN NONOBESE (HCC): ICD-10-CM

## 2022-01-25 DIAGNOSIS — J44.1 COPD EXACERBATION (HCC): ICD-10-CM

## 2022-01-25 DIAGNOSIS — E78.2 MIXED HYPERLIPIDEMIA: ICD-10-CM

## 2022-01-25 DIAGNOSIS — E11.42 DIABETIC POLYNEUROPATHY ASSOCIATED WITH TYPE 2 DIABETES MELLITUS (HCC): ICD-10-CM

## 2022-01-25 DIAGNOSIS — N52.8 OTHER MALE ERECTILE DYSFUNCTION: ICD-10-CM

## 2022-01-25 LAB — SL AMB POCT HEMOGLOBIN AIC: 8 (ref ?–6.5)

## 2022-01-25 PROCEDURE — 3079F DIAST BP 80-89 MM HG: CPT | Performed by: INTERNAL MEDICINE

## 2022-01-25 PROCEDURE — 3008F BODY MASS INDEX DOCD: CPT | Performed by: INTERNAL MEDICINE

## 2022-01-25 PROCEDURE — 3077F SYST BP >= 140 MM HG: CPT | Performed by: INTERNAL MEDICINE

## 2022-01-25 PROCEDURE — 4004F PT TOBACCO SCREEN RCVD TLK: CPT | Performed by: INTERNAL MEDICINE

## 2022-01-25 PROCEDURE — 83036 HEMOGLOBIN GLYCOSYLATED A1C: CPT | Performed by: INTERNAL MEDICINE

## 2022-01-25 PROCEDURE — 4010F ACE/ARB THERAPY RXD/TAKEN: CPT | Performed by: INTERNAL MEDICINE

## 2022-01-25 PROCEDURE — 99214 OFFICE O/P EST MOD 30 MIN: CPT | Performed by: INTERNAL MEDICINE

## 2022-01-25 PROCEDURE — 3052F HG A1C>EQUAL 8.0%<EQUAL 9.0%: CPT | Performed by: INTERNAL MEDICINE

## 2022-01-25 PROCEDURE — 1160F RVW MEDS BY RX/DR IN RCRD: CPT | Performed by: INTERNAL MEDICINE

## 2022-01-25 RX ORDER — CARVEDILOL 3.12 MG/1
TABLET ORAL
Qty: 180 TABLET | Refills: 1 | Status: SHIPPED | OUTPATIENT
Start: 2022-01-25 | End: 2022-08-04 | Stop reason: SDUPTHER

## 2022-01-25 RX ORDER — SILDENAFIL CITRATE 20 MG/1
20 TABLET ORAL DAILY PRN
Qty: 30 TABLET | Refills: 0 | Status: SHIPPED | OUTPATIENT
Start: 2022-01-25 | End: 2022-06-03 | Stop reason: SDUPTHER

## 2022-01-25 RX ORDER — LISINOPRIL 40 MG/1
40 TABLET ORAL 2 TIMES DAILY
Qty: 180 TABLET | Refills: 1 | Status: SHIPPED | OUTPATIENT
Start: 2022-01-25 | End: 2022-08-04 | Stop reason: SDUPTHER

## 2022-01-25 RX ORDER — AMLODIPINE BESYLATE 10 MG/1
10 TABLET ORAL DAILY
Qty: 90 TABLET | Refills: 1 | Status: SHIPPED | OUTPATIENT
Start: 2022-01-25 | End: 2022-08-04 | Stop reason: SDUPTHER

## 2022-01-25 RX ORDER — ATORVASTATIN CALCIUM 20 MG/1
20 TABLET, FILM COATED ORAL
Qty: 90 TABLET | Refills: 1 | Status: SHIPPED | OUTPATIENT
Start: 2022-01-25 | End: 2022-08-04 | Stop reason: SDUPTHER

## 2022-01-25 RX ORDER — TIOTROPIUM BROMIDE 18 UG/1
18 CAPSULE ORAL; RESPIRATORY (INHALATION) DAILY
Qty: 30 CAPSULE | Refills: 5 | Status: SHIPPED | OUTPATIENT
Start: 2022-01-25 | End: 2022-08-04 | Stop reason: SDUPTHER

## 2022-01-25 RX ORDER — ESCITALOPRAM OXALATE 20 MG/1
20 TABLET ORAL DAILY
Qty: 90 TABLET | Refills: 1 | Status: SHIPPED | OUTPATIENT
Start: 2022-01-25 | End: 2022-08-04 | Stop reason: SDUPTHER

## 2022-01-25 RX ORDER — ROPINIROLE 0.5 MG/1
0.5 TABLET, FILM COATED ORAL
Qty: 90 TABLET | Refills: 1 | Status: SHIPPED | OUTPATIENT
Start: 2022-01-25 | End: 2022-07-24

## 2022-01-25 RX ORDER — TRAZODONE HYDROCHLORIDE 100 MG/1
200 TABLET ORAL
Qty: 180 TABLET | Refills: 1 | Status: SHIPPED | OUTPATIENT
Start: 2022-01-25 | End: 2022-08-04

## 2022-01-25 RX ORDER — MONTELUKAST SODIUM 10 MG/1
10 TABLET ORAL
Qty: 90 TABLET | Refills: 1 | Status: CANCELLED | OUTPATIENT
Start: 2022-01-25

## 2022-01-25 RX ORDER — CELECOXIB 100 MG/1
100 CAPSULE ORAL 2 TIMES DAILY
Qty: 180 CAPSULE | Refills: 1 | Status: SHIPPED | OUTPATIENT
Start: 2022-01-25

## 2022-01-25 RX ORDER — BUSPIRONE HYDROCHLORIDE 15 MG/1
15 TABLET ORAL 3 TIMES DAILY
Qty: 270 TABLET | Refills: 1 | Status: SHIPPED | OUTPATIENT
Start: 2022-01-25 | End: 2022-07-24

## 2022-01-25 RX ORDER — GABAPENTIN 100 MG/1
100 CAPSULE ORAL
Qty: 90 CAPSULE | Refills: 1 | Status: SHIPPED | OUTPATIENT
Start: 2022-01-25 | End: 2022-07-24

## 2022-01-25 NOTE — ASSESSMENT & PLAN NOTE
Lab Results   Component Value Date    HGBA1C 8 0 (A) 01/25/2022   A1c is up and the patient states that he will engage in lifestyle change  Continue Glimepiride and metformin for now  Gabapentin 100 mg for neuropathy
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
## 2022-04-18 ENCOUNTER — RA CDI HCC (OUTPATIENT)
Dept: OTHER | Facility: HOSPITAL | Age: 67
End: 2022-04-18

## 2022-04-18 NOTE — PROGRESS NOTES
[facility]  coding opportunities     I11 0, E11 36     Chart Reviewed number of suggestions sent to Provider: 2     Patients Insurance     Medicare Insurance: [address] [place] [address]

## 2022-04-25 ENCOUNTER — OFFICE VISIT (OUTPATIENT)
Dept: INTERNAL MEDICINE CLINIC | Facility: CLINIC | Age: 67
End: 2022-04-25
Payer: COMMERCIAL

## 2022-04-25 VITALS
BODY MASS INDEX: 26.8 KG/M2 | SYSTOLIC BLOOD PRESSURE: 154 MMHG | DIASTOLIC BLOOD PRESSURE: 78 MMHG | TEMPERATURE: 98.2 F | OXYGEN SATURATION: 98 % | RESPIRATION RATE: 14 BRPM | HEART RATE: 66 BPM | HEIGHT: 68 IN | WEIGHT: 176.8 LBS

## 2022-04-25 DIAGNOSIS — Z72.0 NICOTINE ABUSE: ICD-10-CM

## 2022-04-25 DIAGNOSIS — J44.1 COPD EXACERBATION (HCC): ICD-10-CM

## 2022-04-25 DIAGNOSIS — E11.9 TYPE 2 DIABETES MELLITUS WITHOUT COMPLICATION, WITHOUT LONG-TERM CURRENT USE OF INSULIN (HCC): ICD-10-CM

## 2022-04-25 DIAGNOSIS — E11.42 TYPE 2 DIABETES MELLITUS WITH DIABETIC POLYNEUROPATHY, WITHOUT LONG-TERM CURRENT USE OF INSULIN (HCC): Primary | ICD-10-CM

## 2022-04-25 PROBLEM — F10.920 ALCOHOLIC INTOXICATION WITHOUT COMPLICATION (HCC): Status: RESOLVED | Noted: 2020-12-29 | Resolved: 2022-04-25

## 2022-04-25 LAB — SL AMB POCT HEMOGLOBIN AIC: 7.5 (ref ?–6.5)

## 2022-04-25 PROCEDURE — 3051F HG A1C>EQUAL 7.0%<8.0%: CPT | Performed by: INTERNAL MEDICINE

## 2022-04-25 PROCEDURE — 4004F PT TOBACCO SCREEN RCVD TLK: CPT | Performed by: INTERNAL MEDICINE

## 2022-04-25 PROCEDURE — 3078F DIAST BP <80 MM HG: CPT | Performed by: INTERNAL MEDICINE

## 2022-04-25 PROCEDURE — 3008F BODY MASS INDEX DOCD: CPT | Performed by: INTERNAL MEDICINE

## 2022-04-25 PROCEDURE — 83036 HEMOGLOBIN GLYCOSYLATED A1C: CPT | Performed by: INTERNAL MEDICINE

## 2022-04-25 PROCEDURE — 1160F RVW MEDS BY RX/DR IN RCRD: CPT | Performed by: INTERNAL MEDICINE

## 2022-04-25 PROCEDURE — 99214 OFFICE O/P EST MOD 30 MIN: CPT | Performed by: INTERNAL MEDICINE

## 2022-04-25 PROCEDURE — 3077F SYST BP >= 140 MM HG: CPT | Performed by: INTERNAL MEDICINE

## 2022-04-25 RX ORDER — BUPROPION HYDROCHLORIDE 150 MG/1
150 TABLET ORAL EVERY MORNING
Qty: 30 TABLET | Refills: 5 | Status: SHIPPED | OUTPATIENT
Start: 2022-04-25

## 2022-04-25 RX ORDER — NICOTINE 21 MG/24HR
1 PATCH, TRANSDERMAL 24 HOURS TRANSDERMAL EVERY 24 HOURS
Qty: 28 PATCH | Refills: 0 | Status: SHIPPED | OUTPATIENT
Start: 2022-04-25

## 2022-04-25 RX ORDER — ALBUTEROL SULFATE 90 UG/1
2 AEROSOL, METERED RESPIRATORY (INHALATION) EVERY 6 HOURS PRN
Qty: 18 G | Refills: 5 | Status: SHIPPED | OUTPATIENT
Start: 2022-04-25 | End: 2022-10-22

## 2022-04-25 RX ORDER — GLIMEPIRIDE 2 MG/1
3 TABLET ORAL
Qty: 135 TABLET | Refills: 1 | Status: SHIPPED | OUTPATIENT
Start: 2022-04-25 | End: 2022-10-22
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
## 2022-04-25 NOTE — ASSESSMENT & PLAN NOTE
Lab Results   Component Value Date    HGBA1C 7 5 (A) 04/25/2022   The patient has issues with an elevated A1c  The patient state that there are no other issues at this time  Glimepiride increased to 3 mg PO QDay  No hypoglycemia noted

## 2022-04-25 NOTE — PROGRESS NOTES
Diabetic Foot Exam    Patient's shoes and socks removed  Right Foot/Ankle   Right Foot Inspection  Skin Exam: skin normal and skin intact  No dry skin, no warmth, no callus, no erythema, no maceration, no abnormal color, no pre-ulcer, no ulcer and no callus  Toe Exam: ROM and strength within normal limits  no right toe deformity    Sensory   Monofilament testing: intact    Vascular  Capillary refills: < 3 seconds  The right DP pulse is 2+  The right PT pulse is 2+  Left Foot/Ankle  Left Foot Inspection  Skin Exam: skin normal and skin intact  No dry skin, no warmth, no erythema, no maceration, normal color, no pre-ulcer, no ulcer and no callus  Toe Exam: ROM and strength within normal limits  No left toe deformity  Sensory   Monofilament testing: intact    Vascular  Capillary refills: < 3 seconds  The left DP pulse is 2+  The left PT pulse is 2+       Assign Risk Category  No deformity present  No loss of protective sensation  No weak pulses  Risk: 0

## 2022-06-03 DIAGNOSIS — N52.8 OTHER MALE ERECTILE DYSFUNCTION: ICD-10-CM

## 2022-06-06 RX ORDER — SILDENAFIL CITRATE 20 MG/1
20 TABLET ORAL DAILY PRN
Qty: 30 TABLET | Refills: 0 | Status: SHIPPED | OUTPATIENT
Start: 2022-06-06 | End: 2022-07-06

## 2022-08-04 DIAGNOSIS — G47.09 OTHER INSOMNIA: ICD-10-CM

## 2022-08-04 DIAGNOSIS — J44.1 COPD EXACERBATION (HCC): ICD-10-CM

## 2022-08-04 DIAGNOSIS — F33.1 MODERATE EPISODE OF RECURRENT MAJOR DEPRESSIVE DISORDER (HCC): ICD-10-CM

## 2022-08-04 DIAGNOSIS — I10 ESSENTIAL HYPERTENSION: ICD-10-CM

## 2022-08-04 DIAGNOSIS — E78.2 MIXED HYPERLIPIDEMIA: ICD-10-CM

## 2022-08-04 RX ORDER — TRAZODONE HYDROCHLORIDE 100 MG/1
200 TABLET ORAL
Qty: 180 TABLET | Refills: 0 | Status: CANCELLED | OUTPATIENT
Start: 2022-08-04

## 2022-08-04 RX ORDER — TRAZODONE HYDROCHLORIDE 100 MG/1
200 TABLET ORAL
Qty: 180 TABLET | Refills: 1 | Status: SHIPPED | OUTPATIENT
Start: 2022-08-04

## 2022-08-06 NOTE — TELEPHONE ENCOUNTER
Pt called in regarding this medication  He is not sure why it was denied and would like a call back

## 2022-08-08 RX ORDER — CARVEDILOL 3.12 MG/1
TABLET ORAL
Qty: 180 TABLET | Refills: 0 | Status: SHIPPED | OUTPATIENT
Start: 2022-08-08

## 2022-08-08 RX ORDER — AMLODIPINE BESYLATE 10 MG/1
10 TABLET ORAL DAILY
Qty: 90 TABLET | Refills: 0 | Status: SHIPPED | OUTPATIENT
Start: 2022-08-08

## 2022-08-08 RX ORDER — LISINOPRIL 40 MG/1
40 TABLET ORAL 2 TIMES DAILY
Qty: 180 TABLET | Refills: 0 | Status: SHIPPED | OUTPATIENT
Start: 2022-08-08 | End: 2023-02-04

## 2022-08-08 RX ORDER — TIOTROPIUM BROMIDE 18 UG/1
18 CAPSULE ORAL; RESPIRATORY (INHALATION) DAILY
Qty: 30 CAPSULE | Refills: 0 | Status: SHIPPED | OUTPATIENT
Start: 2022-08-08

## 2022-08-08 RX ORDER — ESCITALOPRAM OXALATE 20 MG/1
20 TABLET ORAL DAILY
Qty: 90 TABLET | Refills: 0 | Status: SHIPPED | OUTPATIENT
Start: 2022-08-08

## 2022-08-08 RX ORDER — ATORVASTATIN CALCIUM 20 MG/1
20 TABLET, FILM COATED ORAL
Qty: 90 TABLET | Refills: 0 | Status: SHIPPED | OUTPATIENT
Start: 2022-08-08

## 2022-08-30 ENCOUNTER — RA CDI HCC (OUTPATIENT)
Dept: OTHER | Facility: HOSPITAL | Age: 67
End: 2022-08-30

## 2022-08-30 NOTE — PROGRESS NOTES
[facility]  coding opportunities     E11 36, E11 69, I11 0     Chart Reviewed number of suggestions sent to Provider: 3     Patients Insurance     Medicare Insurance: [employer]

## 2022-09-02 ENCOUNTER — OFFICE VISIT (OUTPATIENT)
Dept: INTERNAL MEDICINE CLINIC | Facility: CLINIC | Age: 67
End: 2022-09-02
Payer: COMMERCIAL

## 2022-09-02 VITALS
BODY MASS INDEX: 25.82 KG/M2 | TEMPERATURE: 98.4 F | SYSTOLIC BLOOD PRESSURE: 136 MMHG | DIASTOLIC BLOOD PRESSURE: 72 MMHG | WEIGHT: 170.38 LBS | HEIGHT: 68 IN | HEART RATE: 57 BPM | OXYGEN SATURATION: 97 %

## 2022-09-02 DIAGNOSIS — E11.42 TYPE 2 DIABETES MELLITUS WITH DIABETIC POLYNEUROPATHY, WITHOUT LONG-TERM CURRENT USE OF INSULIN (HCC): ICD-10-CM

## 2022-09-02 DIAGNOSIS — I10 PRIMARY HYPERTENSION: ICD-10-CM

## 2022-09-02 DIAGNOSIS — Z00.00 MEDICARE ANNUAL WELLNESS VISIT, SUBSEQUENT: Primary | ICD-10-CM

## 2022-09-02 DIAGNOSIS — B35.1 ONYCHOMYCOSIS: ICD-10-CM

## 2022-09-02 DIAGNOSIS — Z12.5 SCREENING PSA (PROSTATE SPECIFIC ANTIGEN): ICD-10-CM

## 2022-09-02 LAB — SL AMB POCT HEMOGLOBIN AIC: 7 (ref ?–6.5)

## 2022-09-02 PROCEDURE — 3075F SYST BP GE 130 - 139MM HG: CPT | Performed by: INTERNAL MEDICINE

## 2022-09-02 PROCEDURE — 1125F AMNT PAIN NOTED PAIN PRSNT: CPT | Performed by: INTERNAL MEDICINE

## 2022-09-02 PROCEDURE — 3078F DIAST BP <80 MM HG: CPT | Performed by: INTERNAL MEDICINE

## 2022-09-02 PROCEDURE — 3725F SCREEN DEPRESSION PERFORMED: CPT | Performed by: INTERNAL MEDICINE

## 2022-09-02 PROCEDURE — G0439 PPPS, SUBSEQ VISIT: HCPCS | Performed by: INTERNAL MEDICINE

## 2022-09-02 PROCEDURE — 3051F HG A1C>EQUAL 7.0%<8.0%: CPT | Performed by: INTERNAL MEDICINE

## 2022-09-02 PROCEDURE — 1170F FXNL STATUS ASSESSED: CPT | Performed by: INTERNAL MEDICINE

## 2022-09-02 PROCEDURE — 83036 HEMOGLOBIN GLYCOSYLATED A1C: CPT | Performed by: INTERNAL MEDICINE

## 2022-09-02 PROCEDURE — 99213 OFFICE O/P EST LOW 20 MIN: CPT | Performed by: INTERNAL MEDICINE

## 2022-09-02 PROCEDURE — 1160F RVW MEDS BY RX/DR IN RCRD: CPT | Performed by: INTERNAL MEDICINE

## 2022-09-02 PROCEDURE — 3288F FALL RISK ASSESSMENT DOCD: CPT | Performed by: INTERNAL MEDICINE

## 2022-09-02 RX ORDER — TERBINAFINE HYDROCHLORIDE 250 MG/1
250 TABLET ORAL DAILY
Qty: 84 TABLET | Refills: 0 | Status: SHIPPED | OUTPATIENT
Start: 2022-09-02 | End: 2022-11-25

## 2022-09-02 NOTE — ASSESSMENT & PLAN NOTE
Lab Results   Component Value Date    HGBA1C 7 0 (A) 09/02/2022   The patient is doing well with Glimepiride and Metformin  Spoke to patient regarding blood work results from 7/14/18. Labs were essentially stable except glucose and triglycerides elevated. No change in Mevacor dose (40 mg daily), but Dr. Lary Cotto would like him to incorporate more fish in diet and reduce carbs/sweets (follow diabetic diet). Patient verbalized understanding of info discussed.

## 2022-09-02 NOTE — PATIENT INSTRUCTIONS
Medicare Preventive Visit Patient Instructions  Thank you for completing your Welcome to Medicare Visit or Medicare Annual Wellness Visit today  Your next wellness visit will be due in one year (9/3/2023)  The screening/preventive services that you may require over the next 5-10 years are detailed below  Some tests may not apply to you based off risk factors and/or age  Screening tests ordered at today's visit but not completed yet may show as past due  Also, please note that scanned in results may not display below  Preventive Screenings:  Service Recommendations Previous Testing/Comments   Colorectal Cancer Screening  · Colonoscopy    · Fecal Occult Blood Test (FOBT)/Fecal Immunochemical Test (FIT)  · Fecal DNA/Cologuard Test  · Flexible Sigmoidoscopy Age: 39-70 years old   Colonoscopy: every 10 years (May be performed more frequently if at higher risk)  OR  FOBT/FIT: every 1 year  OR  Cologuard: every 3 years  OR  Sigmoidoscopy: every 5 years  Screening may be recommended earlier than age 39 if at higher risk for colorectal cancer  Also, an individualized decision between you and your healthcare provider will decide whether screening between the ages of 74-80 would be appropriate   Colonoscopy: Not on file  FOBT/FIT: Not on file  Cologuard: 04/14/2020  Sigmoidoscopy: Not on file    Screening Current     Prostate Cancer Screening Individualized decision between patient and health care provider in men between ages of 53-78   Medicare will cover every 12 months beginning on the day after your 50th birthday PSA: 2 5 ng/mL           Hepatitis C Screening Once for adults born between 1945 and 1965  More frequently in patients at high risk for Hepatitis C Hep C Antibody: 06/11/2020    Screening Current   Diabetes Screening 1-2 times per year if you're at risk for diabetes or have pre-diabetes Fasting glucose: 130 mg/dL (1/15/2020)  A1C: 7 5 (4/25/2022)  Screening Not Indicated  History Diabetes   Cholesterol Screening Once every 5 years if you don't have a lipid disorder  May order more often based on risk factors  Lipid panel: Not on file  Screening Not Indicated  History Lipid Disorder      Other Preventive Screenings Covered by Medicare:  1  Abdominal Aortic Aneurysm (AAA) Screening: covered once if your at risk  You're considered to be at risk if you have a family history of AAA or a male between the age of 73-68 who smoking at least 100 cigarettes in your lifetime  2  Lung Cancer Screening: covers low dose CT scan once per year if you meet all of the following conditions: (1) Age 50-69; (2) No signs or symptoms of lung cancer; (3) Current smoker or have quit smoking within the last 15 years; (4) You have a tobacco smoking history of at least 20 pack years (packs per day x number of years you smoked); (5) You get a written order from a healthcare provider  3  Glaucoma Screening: covered annually if you're considered high risk: (1) You have diabetes OR (2) Family history of glaucoma OR (3)  aged 48 and older OR (3)  American aged 72 and older  3  Osteoporosis Screening: covered every 2 years if you meet one of the following conditions: (1) Have a vertebral abnormality; (2) On glucocorticoid therapy for more than 3 months; (3) Have primary hyperparathyroidism; (4) On osteoporosis medications and need to assess response to drug therapy  5  HIV Screening: covered annually if you're between the age of 12-76  Also covered annually if you are younger than 13 and older than 72 with risk factors for HIV infection  For pregnant patients, it is covered up to 3 times per pregnancy      Immunizations:  Immunization Recommendations   Influenza Vaccine Annual influenza vaccination during flu season is recommended for all persons aged >= 6 months who do not have contraindications   Pneumococcal Vaccine   * Pneumococcal conjugate vaccine = PCV13 (Prevnar 13), PCV15 (Vaxneuvance), PCV20 (Prevnar 20)  * Pneumococcal polysaccharide vaccine = PPSV23 (Pneumovax) Adults 2364 years old: 1-3 doses may be recommended based on certain risk factors  Adults 72 years old: 1-2 doses may be recommended based off what pneumonia vaccine you previously received   Hepatitis B Vaccine 3 dose series if at intermediate or high risk (ex: diabetes, end stage renal disease, liver disease)   Tetanus (Td) Vaccine - COST NOT COVERED BY MEDICARE PART B Following completion of primary series, a booster dose should be given every 10 years to maintain immunity against tetanus  Td may also be given as tetanus wound prophylaxis  Tdap Vaccine - COST NOT COVERED BY MEDICARE PART B Recommended at least once for all adults  For pregnant patients, recommended with each pregnancy  Shingles Vaccine (Shingrix) - COST NOT COVERED BY MEDICARE PART B  2 shot series recommended in those aged 48 and above     Health Maintenance Due:      Topic Date Due    Lung Cancer Screening  Never done    Colorectal Cancer Screening  04/14/2023    Hepatitis C Screening  Completed     Immunizations Due:      Topic Date Due    Influenza Vaccine (1) 09/01/2022     Advance Directives   What are advance directives? Advance directives are legal documents that state your wishes and plans for medical care  These plans are made ahead of time in case you lose your ability to make decisions for yourself  Advance directives can apply to any medical decision, such as the treatments you want, and if you want to donate organs  What are the types of advance directives? There are many types of advance directives, and each state has rules about how to use them  You may choose a combination of any of the following:  · Living will: This is a written record of the treatment you want  You can also choose which treatments you do not want, which to limit, and which to stop at a certain time  This includes surgery, medicine, IV fluid, and tube feedings     · Durable power of  for healthcare [facility]): This is a written record that states who you want to make healthcare choices for you when you are unable to make them for yourself  This person, called a proxy, is usually a family member or a friend  You may choose more than 1 proxy  · Do not resuscitate (DNR) order:  A DNR order is used in case your heart stops beating or you stop breathing  It is a request not to have certain forms of treatment, such as CPR  A DNR order may be included in other types of advance directives  · Medical directive: This covers the care that you want if you are in a coma, near death, or unable to make decisions for yourself  You can list the treatments you want for each condition  Treatment may include pain medicine, surgery, blood transfusions, dialysis, IV or tube feedings, and a ventilator (breathing machine)  · Values history: This document has questions about your views, beliefs, and how you feel and think about life  This information can help others choose the care that you would choose  Why are advance directives important? An advance directive helps you control your care  Although spoken wishes may be used, it is better to have your wishes written down  Spoken wishes can be misunderstood, or not followed  Treatments may be given even if you do not want them  An advance directive may make it easier for your family to make difficult choices about your care  Cigarette Smoking and Your Health   Risks to your health if you smoke:  Nicotine and other chemicals found in tobacco damage every cell in your body  Even if you are a light smoker, you have an increased risk for cancer, heart disease, and lung disease  If you are pregnant or have diabetes, smoking increases your risk for complications  Benefits to your health if you stop smoking:   · You decrease respiratory symptoms such as coughing, wheezing, and shortness of breath     · You reduce your risk for cancers of the lung, mouth, throat, kidney, bladder, pancreas, stomach, and cervix  If you already have cancer, you increase the benefits of chemotherapy  You also reduce your risk for cancer returning or a second cancer from developing  · You reduce your risk for heart disease, blood clots, heart attack, and stroke  · You reduce your risk for lung infections, and diseases such as pneumonia, asthma, chronic bronchitis, and emphysema  · Your circulation improves  More oxygen can be delivered to your body  If you have diabetes, you lower your risk for complications, such as kidney, artery, and eye diseases  You also lower your risk for nerve damage  Nerve damage can lead to amputations, poor vision, and blindness  · You improve your body's ability to heal and to fight infections  For more information and support to stop smoking:   · [employer]  Phone: 2- 309 - [phone]  Web Address: [employer]  Weight Management   Why it is important to manage your weight:  Being overweight increases your risk of health conditions such as heart disease, high blood pressure, type 2 diabetes, and certain types of cancer  It can also increase your risk for osteoarthritis, sleep apnea, and other respiratory problems  Aim for a slow, steady weight loss  Even a small amount of weight loss can lower your risk of health problems  How to lose weight safely:  A safe and healthy way to lose weight is to eat fewer calories and get regular exercise  You can lose up about 1 pound a week by decreasing the number of calories you eat by 500 calories each day  Healthy meal plan for weight management:  A healthy meal plan includes a variety of foods, contains fewer calories, and helps you stay healthy  A healthy meal plan includes the following:  · Eat whole-grain foods more often  A healthy meal plan should contain fiber  Fiber is the part of grains, fruits, and vegetables that is not broken down by your body   Whole-grain foods are healthy and provide extra fiber in your diet  Some examples of whole-grain foods are whole-wheat breads and pastas, oatmeal, brown rice, and bulgur  · Eat a variety of vegetables every day  Include dark, leafy greens such as spinach, kale, catalina greens, and mustard greens  Eat yellow and orange vegetables such as carrots, sweet potatoes, and winter squash  · Eat a variety of fruits every day  Choose fresh or canned fruit (canned in its own juice or light syrup) instead of juice  Fruit juice has very little or no fiber  · Eat low-fat dairy foods  Drink fat-free (skim) milk or 1% milk  Eat fat-free yogurt and low-fat cottage cheese  Try low-fat cheeses such as mozzarella and other reduced-fat cheeses  · Choose meat and other protein foods that are low in fat  Choose beans or other legumes such as split peas or lentils  Choose fish, skinless poultry (chicken or turkey), or lean cuts of red meat (beef or pork)  Before you cook meat or poultry, cut off any visible fat  · Use less fat and oil  Try baking foods instead of frying them  Add less fat, such as margarine, sour cream, regular salad dressing and mayonnaise to foods  Eat fewer high-fat foods  Some examples of high-fat foods include french fries, doughnuts, ice cream, and cakes  · Eat fewer sweets  Limit foods and drinks that are high in sugar  This includes candy, cookies, regular soda, and sweetened drinks  Exercise:  Exercise at least 30 minutes per day on most days of the week  Some examples of exercise include walking, biking, dancing, and swimming  You can also fit in more physical activity by taking the stairs instead of the elevator or parking farther away from stores  Ask your healthcare provider about the best exercise plan for you  © Copyright [employer] 2018 Information is for End User's use only and may not be sold, redistributed or otherwise used for commercial purposes   All illustrations and images included in CareNotes® are the copyrighted property of [employer]  or [address] [facility]
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
## 2022-10-19 DIAGNOSIS — N52.8 OTHER MALE ERECTILE DYSFUNCTION: ICD-10-CM

## 2022-10-19 DIAGNOSIS — E11.9 DIABETES MELLITUS TYPE 2 IN NONOBESE (HCC): ICD-10-CM

## 2022-10-19 RX ORDER — SILDENAFIL CITRATE 20 MG/1
20 TABLET ORAL DAILY PRN
Qty: 30 TABLET | Refills: 0 | Status: SHIPPED | OUTPATIENT
Start: 2022-10-19 | End: 2022-11-18

## 2022-11-06 DIAGNOSIS — J44.1 COPD EXACERBATION (HCC): ICD-10-CM

## 2022-11-07 DIAGNOSIS — J44.1 COPD EXACERBATION (HCC): ICD-10-CM

## 2022-11-07 RX ORDER — TIOTROPIUM BROMIDE 18 UG/1
18 CAPSULE ORAL; RESPIRATORY (INHALATION) DAILY
Qty: 30 CAPSULE | Refills: 0 | Status: SHIPPED | OUTPATIENT
Start: 2022-11-07

## 2022-11-20 DIAGNOSIS — I10 ESSENTIAL HYPERTENSION: ICD-10-CM

## 2022-11-20 DIAGNOSIS — Z72.0 NICOTINE ABUSE: ICD-10-CM

## 2022-11-21 RX ORDER — CARVEDILOL 3.12 MG/1
TABLET ORAL
Qty: 180 TABLET | Refills: 1 | Status: SHIPPED | OUTPATIENT
Start: 2022-11-21

## 2022-11-21 RX ORDER — BUPROPION HYDROCHLORIDE 150 MG/1
150 TABLET ORAL EVERY MORNING
Qty: 30 TABLET | Refills: 5 | Status: SHIPPED | OUTPATIENT
Start: 2022-11-21

## 2022-11-21 RX ORDER — LISINOPRIL 40 MG/1
40 TABLET ORAL 2 TIMES DAILY
Qty: 180 TABLET | Refills: 1 | Status: SHIPPED | OUTPATIENT
Start: 2022-11-21 | End: 2023-05-20

## 2022-12-18 DIAGNOSIS — E11.9 TYPE 2 DIABETES MELLITUS WITHOUT COMPLICATION, WITHOUT LONG-TERM CURRENT USE OF INSULIN (HCC): ICD-10-CM

## 2022-12-18 DIAGNOSIS — I10 ESSENTIAL HYPERTENSION: ICD-10-CM

## 2022-12-18 DIAGNOSIS — E78.2 MIXED HYPERLIPIDEMIA: ICD-10-CM

## 2022-12-18 DIAGNOSIS — F33.1 MODERATE EPISODE OF RECURRENT MAJOR DEPRESSIVE DISORDER (HCC): ICD-10-CM

## 2022-12-19 RX ORDER — GLIMEPIRIDE 2 MG/1
3 TABLET ORAL
Qty: 135 TABLET | Refills: 0 | Status: SHIPPED | OUTPATIENT
Start: 2022-12-19 | End: 2023-06-17

## 2022-12-19 RX ORDER — ESCITALOPRAM OXALATE 20 MG/1
20 TABLET ORAL DAILY
Qty: 90 TABLET | Refills: 0 | Status: SHIPPED | OUTPATIENT
Start: 2022-12-19

## 2022-12-19 RX ORDER — AMLODIPINE BESYLATE 10 MG/1
10 TABLET ORAL DAILY
Qty: 90 TABLET | Refills: 0 | Status: SHIPPED | OUTPATIENT
Start: 2022-12-19

## 2022-12-19 RX ORDER — ATORVASTATIN CALCIUM 20 MG/1
20 TABLET, FILM COATED ORAL
Qty: 90 TABLET | Refills: 0 | Status: SHIPPED | OUTPATIENT
Start: 2022-12-19

## 2023-01-11 DIAGNOSIS — N52.8 OTHER MALE ERECTILE DYSFUNCTION: ICD-10-CM

## 2023-01-11 DIAGNOSIS — J44.1 COPD EXACERBATION (HCC): ICD-10-CM

## 2023-01-11 RX ORDER — TIOTROPIUM BROMIDE 18 UG/1
18 CAPSULE ORAL; RESPIRATORY (INHALATION) DAILY
Qty: 30 CAPSULE | Refills: 0 | Status: SHIPPED | OUTPATIENT
Start: 2023-01-11

## 2023-01-11 RX ORDER — SILDENAFIL CITRATE 20 MG/1
20 TABLET ORAL DAILY PRN
Qty: 30 TABLET | Refills: 0 | Status: SHIPPED | OUTPATIENT
Start: 2023-01-11 | End: 2023-02-10

## 2023-02-06 DIAGNOSIS — G47.09 OTHER INSOMNIA: ICD-10-CM

## 2023-02-06 DIAGNOSIS — E11.9 DIABETES MELLITUS TYPE 2 IN NONOBESE (HCC): ICD-10-CM

## 2023-02-06 RX ORDER — TRAZODONE HYDROCHLORIDE 100 MG/1
200 TABLET ORAL
Qty: 180 TABLET | Refills: 0 | Status: SHIPPED | OUTPATIENT
Start: 2023-02-06

## 2023-02-28 ENCOUNTER — RA CDI HCC (OUTPATIENT)
Dept: OTHER | Facility: HOSPITAL | Age: 68
End: 2023-02-28

## 2023-03-01 NOTE — PROGRESS NOTES
[facility]  coding opportunities     E11 65, E11 36, I11 0     Chart Reviewed number of suggestions sent to Provider: 3     Patients Insurance     Medicare Insurance: [address] [place] [address]

## 2023-03-03 ENCOUNTER — LAB (OUTPATIENT)
Dept: LAB | Facility: CLINIC | Age: 68
End: 2023-03-03

## 2023-03-03 ENCOUNTER — OFFICE VISIT (OUTPATIENT)
Dept: INTERNAL MEDICINE CLINIC | Facility: CLINIC | Age: 68
End: 2023-03-03

## 2023-03-03 VITALS
WEIGHT: 176 LBS | HEART RATE: 64 BPM | OXYGEN SATURATION: 96 % | DIASTOLIC BLOOD PRESSURE: 66 MMHG | HEIGHT: 68 IN | SYSTOLIC BLOOD PRESSURE: 120 MMHG | TEMPERATURE: 98 F | BODY MASS INDEX: 26.67 KG/M2

## 2023-03-03 DIAGNOSIS — I50.23 ACUTE ON CHRONIC SYSTOLIC (CONGESTIVE) HEART FAILURE (HCC): ICD-10-CM

## 2023-03-03 DIAGNOSIS — E11.42 TYPE 2 DIABETES MELLITUS WITH DIABETIC POLYNEUROPATHY, WITHOUT LONG-TERM CURRENT USE OF INSULIN (HCC): ICD-10-CM

## 2023-03-03 DIAGNOSIS — Z12.5 SCREENING PSA (PROSTATE SPECIFIC ANTIGEN): ICD-10-CM

## 2023-03-03 DIAGNOSIS — F33.1 MODERATE EPISODE OF RECURRENT MAJOR DEPRESSIVE DISORDER (HCC): ICD-10-CM

## 2023-03-03 DIAGNOSIS — J44.1 COPD EXACERBATION (HCC): ICD-10-CM

## 2023-03-03 DIAGNOSIS — N52.8 OTHER MALE ERECTILE DYSFUNCTION: ICD-10-CM

## 2023-03-03 DIAGNOSIS — E11.42 DIABETIC POLYNEUROPATHY ASSOCIATED WITH TYPE 2 DIABETES MELLITUS (HCC): ICD-10-CM

## 2023-03-03 DIAGNOSIS — E11.9 TYPE 2 DIABETES MELLITUS WITHOUT COMPLICATION, WITHOUT LONG-TERM CURRENT USE OF INSULIN (HCC): ICD-10-CM

## 2023-03-03 DIAGNOSIS — E11.42 TYPE 2 DIABETES MELLITUS WITH DIABETIC POLYNEUROPATHY, WITHOUT LONG-TERM CURRENT USE OF INSULIN (HCC): Primary | ICD-10-CM

## 2023-03-03 LAB
ALBUMIN SERPL BCP-MCNC: 3.7 G/DL (ref 3.5–5)
ALP SERPL-CCNC: 51 U/L (ref 46–116)
ALT SERPL W P-5'-P-CCNC: 11 U/L (ref 12–78)
ANION GAP SERPL CALCULATED.3IONS-SCNC: 1 MMOL/L (ref 4–13)
AST SERPL W P-5'-P-CCNC: 11 U/L (ref 5–45)
BASOPHILS # BLD AUTO: 0.1 THOUSANDS/ÂΜL (ref 0–0.1)
BASOPHILS NFR BLD AUTO: 1 % (ref 0–1)
BILIRUB SERPL-MCNC: 0.38 MG/DL (ref 0.2–1)
BUN SERPL-MCNC: 14 MG/DL (ref 5–25)
CALCIUM SERPL-MCNC: 9.6 MG/DL (ref 8.3–10.1)
CHLORIDE SERPL-SCNC: 104 MMOL/L (ref 96–108)
CHOLEST SERPL-MCNC: 135 MG/DL
CO2 SERPL-SCNC: 31 MMOL/L (ref 21–32)
CREAT SERPL-MCNC: 1.06 MG/DL (ref 0.6–1.3)
CREAT UR-MCNC: 72 MG/DL
EOSINOPHIL # BLD AUTO: 0.31 THOUSAND/ÂΜL (ref 0–0.61)
EOSINOPHIL NFR BLD AUTO: 4 % (ref 0–6)
ERYTHROCYTE [DISTWIDTH] IN BLOOD BY AUTOMATED COUNT: 13 % (ref 11.6–15.1)
GFR SERPL CREATININE-BSD FRML MDRD: 71 ML/MIN/1.73SQ M
GLUCOSE P FAST SERPL-MCNC: 190 MG/DL (ref 65–99)
HCT VFR BLD AUTO: 44.8 % (ref 36.5–49.3)
HDLC SERPL-MCNC: 56 MG/DL
HGB BLD-MCNC: 14.8 G/DL (ref 12–17)
IMM GRANULOCYTES # BLD AUTO: 0.02 THOUSAND/UL (ref 0–0.2)
IMM GRANULOCYTES NFR BLD AUTO: 0 % (ref 0–2)
LDLC SERPL CALC-MCNC: 61 MG/DL (ref 0–100)
LYMPHOCYTES # BLD AUTO: 1.97 THOUSANDS/ÂΜL (ref 0.6–4.47)
LYMPHOCYTES NFR BLD AUTO: 23 % (ref 14–44)
MCH RBC QN AUTO: 30.7 PG (ref 26.8–34.3)
MCHC RBC AUTO-ENTMCNC: 33 G/DL (ref 31.4–37.4)
MCV RBC AUTO: 93 FL (ref 82–98)
MICROALBUMIN UR-MCNC: 58.1 MG/L (ref 0–20)
MICROALBUMIN/CREAT 24H UR: 81 MG/G CREATININE (ref 0–30)
MONOCYTES # BLD AUTO: 0.7 THOUSAND/ÂΜL (ref 0.17–1.22)
MONOCYTES NFR BLD AUTO: 8 % (ref 4–12)
NEUTROPHILS # BLD AUTO: 5.37 THOUSANDS/ÂΜL (ref 1.85–7.62)
NEUTS SEG NFR BLD AUTO: 64 % (ref 43–75)
NRBC BLD AUTO-RTO: 0 /100 WBCS
PLATELET # BLD AUTO: 322 THOUSANDS/UL (ref 149–390)
PMV BLD AUTO: 10.6 FL (ref 8.9–12.7)
POTASSIUM SERPL-SCNC: 4.4 MMOL/L (ref 3.5–5.3)
PROT SERPL-MCNC: 7 G/DL (ref 6.4–8.4)
RBC # BLD AUTO: 4.82 MILLION/UL (ref 3.88–5.62)
SODIUM SERPL-SCNC: 136 MMOL/L (ref 135–147)
TRIGL SERPL-MCNC: 92 MG/DL
WBC # BLD AUTO: 8.47 THOUSAND/UL (ref 4.31–10.16)

## 2023-03-03 RX ORDER — GABAPENTIN 300 MG/1
300 CAPSULE ORAL 3 TIMES DAILY
Qty: 90 CAPSULE | Refills: 5 | Status: SHIPPED | OUTPATIENT
Start: 2023-03-03 | End: 2023-04-02

## 2023-03-03 RX ORDER — SILDENAFIL CITRATE 20 MG/1
20 TABLET ORAL DAILY PRN
Qty: 30 TABLET | Refills: 0 | Status: SHIPPED | OUTPATIENT
Start: 2023-03-03 | End: 2023-04-02
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
## 2023-03-03 NOTE — ASSESSMENT & PLAN NOTE
Lab Results   Component Value Date    HGBA1C 7 0 (A) 09/02/2022   PM hypoglycemia noted  Metformin 1000 mg PO QAM and Metfromin 1/2 1000 mg Qlunch and [employer]

## 2023-03-04 LAB — PSA SERPL-MCNC: 6.2 NG/ML (ref 0–4)

## 2023-03-05 DIAGNOSIS — R97.20 PSA ELEVATION: Primary | ICD-10-CM

## 2023-03-07 DIAGNOSIS — J44.1 COPD EXACERBATION (HCC): ICD-10-CM

## 2023-03-07 RX ORDER — TIOTROPIUM BROMIDE 18 UG/1
18 CAPSULE ORAL; RESPIRATORY (INHALATION) DAILY
Qty: 30 CAPSULE | Refills: 0 | Status: SHIPPED | OUTPATIENT
Start: 2023-03-07

## 2023-03-24 DIAGNOSIS — I10 ESSENTIAL HYPERTENSION: ICD-10-CM

## 2023-03-24 DIAGNOSIS — N52.8 OTHER MALE ERECTILE DYSFUNCTION: ICD-10-CM

## 2023-03-24 DIAGNOSIS — F33.1 MODERATE EPISODE OF RECURRENT MAJOR DEPRESSIVE DISORDER (HCC): ICD-10-CM

## 2023-03-24 RX ORDER — AMLODIPINE BESYLATE 10 MG/1
10 TABLET ORAL DAILY
Qty: 90 TABLET | Refills: 1 | Status: SHIPPED | OUTPATIENT
Start: 2023-03-24

## 2023-03-24 RX ORDER — ESCITALOPRAM OXALATE 20 MG/1
20 TABLET ORAL DAILY
Qty: 90 TABLET | Refills: 1 | Status: SHIPPED | OUTPATIENT
Start: 2023-03-24

## 2023-03-24 RX ORDER — SILDENAFIL CITRATE 20 MG/1
20 TABLET ORAL DAILY PRN
Qty: 30 TABLET | Refills: 0 | Status: SHIPPED | OUTPATIENT
Start: 2023-03-24 | End: 2023-04-23

## 2023-04-01 DIAGNOSIS — J44.1 COPD EXACERBATION (HCC): ICD-10-CM

## 2023-04-01 RX ORDER — TIOTROPIUM BROMIDE 18 UG/1
18 CAPSULE ORAL; RESPIRATORY (INHALATION) DAILY
Qty: 30 CAPSULE | Refills: 0 | Status: SHIPPED | OUTPATIENT
Start: 2023-04-01 | End: 2023-04-03

## 2023-04-03 DIAGNOSIS — J44.1 COPD EXACERBATION (HCC): ICD-10-CM

## 2023-04-03 RX ORDER — TIOTROPIUM BROMIDE 18 UG/1
18 CAPSULE ORAL; RESPIRATORY (INHALATION) DAILY
Qty: 30 CAPSULE | Refills: 0 | Status: SHIPPED | OUTPATIENT
Start: 2023-04-03

## 2023-04-26 DIAGNOSIS — I10 ESSENTIAL HYPERTENSION: ICD-10-CM

## 2023-04-26 RX ORDER — LISINOPRIL 40 MG/1
40 TABLET ORAL 2 TIMES DAILY
Qty: 180 TABLET | Refills: 1 | Status: SHIPPED | OUTPATIENT
Start: 2023-04-26 | End: 2023-10-23

## 2023-04-28 DIAGNOSIS — J44.1 COPD EXACERBATION (HCC): ICD-10-CM

## 2023-04-28 RX ORDER — TIOTROPIUM BROMIDE 18 UG/1
18 CAPSULE ORAL; RESPIRATORY (INHALATION) DAILY
Qty: 30 CAPSULE | Refills: 0 | Status: SHIPPED | OUTPATIENT
Start: 2023-04-28

## 2023-05-04 DIAGNOSIS — E78.2 MIXED HYPERLIPIDEMIA: ICD-10-CM

## 2023-05-04 DIAGNOSIS — E11.9 TYPE 2 DIABETES MELLITUS WITHOUT COMPLICATION, WITHOUT LONG-TERM CURRENT USE OF INSULIN (HCC): ICD-10-CM

## 2023-05-04 DIAGNOSIS — Z72.0 NICOTINE ABUSE: ICD-10-CM

## 2023-05-04 DIAGNOSIS — G47.09 OTHER INSOMNIA: ICD-10-CM

## 2023-05-04 RX ORDER — TRAZODONE HYDROCHLORIDE 100 MG/1
200 TABLET ORAL
Qty: 180 TABLET | Refills: 1 | Status: SHIPPED | OUTPATIENT
Start: 2023-05-04

## 2023-05-04 RX ORDER — GLIMEPIRIDE 2 MG/1
3 TABLET ORAL
Qty: 135 TABLET | Refills: 1 | Status: SHIPPED | OUTPATIENT
Start: 2023-05-04 | End: 2023-10-31

## 2023-05-04 RX ORDER — ATORVASTATIN CALCIUM 20 MG/1
20 TABLET, FILM COATED ORAL
Qty: 90 TABLET | Refills: 1 | Status: SHIPPED | OUTPATIENT
Start: 2023-05-04

## 2023-05-04 RX ORDER — BUPROPION HYDROCHLORIDE 150 MG/1
150 TABLET ORAL EVERY MORNING
Qty: 30 TABLET | Refills: 1 | Status: SHIPPED | OUTPATIENT
Start: 2023-05-04

## 2023-06-01 DIAGNOSIS — E11.9 DIABETES MELLITUS TYPE 2 IN NONOBESE (HCC): ICD-10-CM

## 2023-06-14 DIAGNOSIS — J44.1 COPD EXACERBATION (HCC): ICD-10-CM

## 2023-06-14 RX ORDER — TIOTROPIUM BROMIDE 18 UG/1
18 CAPSULE ORAL; RESPIRATORY (INHALATION) DAILY
Qty: 30 CAPSULE | Refills: 0 | Status: SHIPPED | OUTPATIENT
Start: 2023-06-14

## 2023-06-27 DIAGNOSIS — Z72.0 NICOTINE ABUSE: ICD-10-CM

## 2023-06-27 RX ORDER — BUPROPION HYDROCHLORIDE 150 MG/1
150 TABLET ORAL EVERY MORNING
Qty: 30 TABLET | Refills: 1 | Status: SHIPPED | OUTPATIENT
Start: 2023-06-27

## 2023-06-29 DIAGNOSIS — N52.8 OTHER MALE ERECTILE DYSFUNCTION: ICD-10-CM

## 2023-06-29 DIAGNOSIS — F33.1 MODERATE EPISODE OF RECURRENT MAJOR DEPRESSIVE DISORDER (HCC): ICD-10-CM

## 2023-06-29 DIAGNOSIS — I10 ESSENTIAL HYPERTENSION: ICD-10-CM

## 2023-06-29 RX ORDER — ESCITALOPRAM OXALATE 20 MG/1
20 TABLET ORAL DAILY
Qty: 90 TABLET | Refills: 1 | Status: SHIPPED | OUTPATIENT
Start: 2023-06-29

## 2023-06-29 RX ORDER — SILDENAFIL CITRATE 20 MG/1
20 TABLET ORAL DAILY PRN
Qty: 30 TABLET | Refills: 1 | Status: SHIPPED | OUTPATIENT
Start: 2023-06-29 | End: 2023-07-29

## 2023-06-29 RX ORDER — CARVEDILOL 3.12 MG/1
TABLET ORAL
Qty: 180 TABLET | Refills: 1 | Status: SHIPPED | OUTPATIENT
Start: 2023-06-29

## 2023-07-07 ENCOUNTER — RA CDI HCC (OUTPATIENT)
Dept: OTHER | Facility: HOSPITAL | Age: 68
End: 2023-07-07

## 2023-07-07 NOTE — PROGRESS NOTES
[address] [facility] coding opportunities     I11.0, E11.36     Chart Reviewed number of suggestions sent to Provider: 2   GR    Patients Insurance     Medicare Insurance: [facility]

## 2023-07-11 DIAGNOSIS — J44.1 COPD EXACERBATION (HCC): ICD-10-CM

## 2023-07-11 RX ORDER — TIOTROPIUM BROMIDE 18 UG/1
18 CAPSULE ORAL; RESPIRATORY (INHALATION) DAILY
Qty: 30 CAPSULE | Refills: 0 | Status: SHIPPED | OUTPATIENT
Start: 2023-07-11

## 2023-07-12 DIAGNOSIS — I10 ESSENTIAL HYPERTENSION: ICD-10-CM

## 2023-07-12 DIAGNOSIS — E78.2 MIXED HYPERLIPIDEMIA: ICD-10-CM

## 2023-07-12 DIAGNOSIS — G47.09 OTHER INSOMNIA: ICD-10-CM

## 2023-07-12 RX ORDER — AMLODIPINE BESYLATE 10 MG/1
10 TABLET ORAL DAILY
Qty: 90 TABLET | Refills: 1 | Status: SHIPPED | OUTPATIENT
Start: 2023-07-12

## 2023-07-12 RX ORDER — ATORVASTATIN CALCIUM 20 MG/1
20 TABLET, FILM COATED ORAL
Qty: 90 TABLET | Refills: 1 | Status: SHIPPED | OUTPATIENT
Start: 2023-07-12

## 2023-07-12 RX ORDER — TRAZODONE HYDROCHLORIDE 100 MG/1
200 TABLET ORAL
Qty: 180 TABLET | Refills: 1 | Status: SHIPPED | OUTPATIENT
Start: 2023-07-12

## 2023-07-15 DIAGNOSIS — M54.50 CHRONIC BILATERAL LOW BACK PAIN WITHOUT SCIATICA: ICD-10-CM

## 2023-07-15 DIAGNOSIS — G89.29 CHRONIC BILATERAL LOW BACK PAIN WITHOUT SCIATICA: ICD-10-CM

## 2023-07-17 RX ORDER — CELECOXIB 100 MG/1
100 CAPSULE ORAL 2 TIMES DAILY
Qty: 180 CAPSULE | Refills: 1 | Status: SHIPPED | OUTPATIENT
Start: 2023-07-17

## 2023-07-24 ENCOUNTER — VBI (OUTPATIENT)
Dept: ADMINISTRATIVE | Facility: OTHER | Age: 68
End: 2023-07-24

## 2023-07-25 DIAGNOSIS — J44.1 COPD EXACERBATION (HCC): ICD-10-CM

## 2023-07-25 RX ORDER — TIOTROPIUM BROMIDE 18 UG/1
18 CAPSULE ORAL; RESPIRATORY (INHALATION) DAILY
Qty: 30 CAPSULE | Refills: 0 | OUTPATIENT
Start: 2023-07-25

## 2023-07-25 RX ORDER — TIOTROPIUM BROMIDE 18 UG/1
18 CAPSULE ORAL; RESPIRATORY (INHALATION) DAILY
Qty: 30 CAPSULE | Refills: 0 | Status: SHIPPED | OUTPATIENT
Start: 2023-07-25

## 2023-08-16 ENCOUNTER — VBI (OUTPATIENT)
Dept: ADMINISTRATIVE | Facility: OTHER | Age: 68
End: 2023-08-16

## 2023-08-20 DIAGNOSIS — Z72.0 NICOTINE ABUSE: ICD-10-CM

## 2023-08-20 RX ORDER — BUPROPION HYDROCHLORIDE 150 MG/1
150 TABLET ORAL EVERY MORNING
Qty: 30 TABLET | Refills: 1 | Status: SHIPPED | OUTPATIENT
Start: 2023-08-20

## 2023-09-26 DIAGNOSIS — J44.1 COPD EXACERBATION (HCC): ICD-10-CM

## 2023-09-26 RX ORDER — TIOTROPIUM BROMIDE 18 UG/1
18 CAPSULE ORAL; RESPIRATORY (INHALATION) DAILY
Qty: 30 CAPSULE | Refills: 0 | Status: SHIPPED | OUTPATIENT
Start: 2023-09-26

## 2023-10-23 DIAGNOSIS — J44.1 COPD EXACERBATION (HCC): ICD-10-CM

## 2023-10-23 RX ORDER — TIOTROPIUM BROMIDE 18 UG/1
18 CAPSULE ORAL; RESPIRATORY (INHALATION) DAILY
Qty: 30 CAPSULE | Refills: 0 | Status: SHIPPED | OUTPATIENT
Start: 2023-10-23

## 2023-11-19 DIAGNOSIS — J44.1 COPD EXACERBATION (HCC): ICD-10-CM

## 2023-11-19 RX ORDER — TIOTROPIUM BROMIDE 18 UG/1
18 CAPSULE ORAL; RESPIRATORY (INHALATION) DAILY
Qty: 30 CAPSULE | Refills: 0 | Status: SHIPPED | OUTPATIENT
Start: 2023-11-19

## 2023-11-22 DIAGNOSIS — E11.9 DIABETES MELLITUS TYPE 2 IN NONOBESE (HCC): ICD-10-CM

## 2023-12-17 DIAGNOSIS — J44.1 COPD EXACERBATION (HCC): ICD-10-CM

## 2023-12-17 RX ORDER — TIOTROPIUM BROMIDE 18 UG/1
18 CAPSULE ORAL; RESPIRATORY (INHALATION) DAILY
Qty: 30 CAPSULE | Refills: 0 | Status: SHIPPED | OUTPATIENT
Start: 2023-12-17

## 2023-12-29 DIAGNOSIS — E78.2 MIXED HYPERLIPIDEMIA: ICD-10-CM

## 2023-12-29 DIAGNOSIS — I10 ESSENTIAL HYPERTENSION: ICD-10-CM

## 2023-12-29 DIAGNOSIS — E11.9 TYPE 2 DIABETES MELLITUS WITHOUT COMPLICATION, WITHOUT LONG-TERM CURRENT USE OF INSULIN (HCC): ICD-10-CM

## 2023-12-29 RX ORDER — CARVEDILOL 3.12 MG/1
TABLET ORAL
Qty: 180 TABLET | Refills: 1 | Status: SHIPPED | OUTPATIENT
Start: 2023-12-29

## 2023-12-29 RX ORDER — ATORVASTATIN CALCIUM 20 MG/1
20 TABLET, FILM COATED ORAL
Qty: 90 TABLET | Refills: 1 | Status: SHIPPED | OUTPATIENT
Start: 2023-12-29

## 2023-12-29 RX ORDER — AMLODIPINE BESYLATE 10 MG/1
10 TABLET ORAL DAILY
Qty: 90 TABLET | Refills: 1 | Status: SHIPPED | OUTPATIENT
Start: 2023-12-29

## 2023-12-29 RX ORDER — GLIMEPIRIDE 2 MG/1
3 TABLET ORAL
Qty: 135 TABLET | Refills: 1 | Status: SHIPPED | OUTPATIENT
Start: 2023-12-29 | End: 2024-06-26

## 2024-01-05 ENCOUNTER — OFFICE VISIT (OUTPATIENT)
Dept: INTERNAL MEDICINE CLINIC | Facility: CLINIC | Age: 69
End: 2024-01-05
Payer: COMMERCIAL

## 2024-01-05 ENCOUNTER — RA CDI HCC (OUTPATIENT)
Dept: OTHER | Facility: HOSPITAL | Age: 69
End: 2024-01-05

## 2024-01-05 VITALS
OXYGEN SATURATION: 98 % | WEIGHT: 194.2 LBS | SYSTOLIC BLOOD PRESSURE: 124 MMHG | HEART RATE: 62 BPM | HEIGHT: 68 IN | DIASTOLIC BLOOD PRESSURE: 76 MMHG | TEMPERATURE: 97.8 F | BODY MASS INDEX: 29.43 KG/M2

## 2024-01-05 DIAGNOSIS — I50.32 CHRONIC DIASTOLIC CONGESTIVE HEART FAILURE (HCC): ICD-10-CM

## 2024-01-05 DIAGNOSIS — Z23 ENCOUNTER FOR IMMUNIZATION: ICD-10-CM

## 2024-01-05 DIAGNOSIS — J44.1 COPD EXACERBATION (HCC): ICD-10-CM

## 2024-01-05 DIAGNOSIS — Z12.12 SCREENING FOR RECTAL CANCER: ICD-10-CM

## 2024-01-05 DIAGNOSIS — G47.09 OTHER INSOMNIA: ICD-10-CM

## 2024-01-05 DIAGNOSIS — E11.42 DIABETIC POLYNEUROPATHY ASSOCIATED WITH TYPE 2 DIABETES MELLITUS (HCC): ICD-10-CM

## 2024-01-05 DIAGNOSIS — Z59.82 INABILITY TO ACQUIRE TRANSPORTATION: ICD-10-CM

## 2024-01-05 DIAGNOSIS — Z00.00 MEDICARE ANNUAL WELLNESS VISIT, SUBSEQUENT: Primary | ICD-10-CM

## 2024-01-05 DIAGNOSIS — I50.23 ACUTE ON CHRONIC SYSTOLIC (CONGESTIVE) HEART FAILURE (HCC): ICD-10-CM

## 2024-01-05 DIAGNOSIS — F33.1 MODERATE EPISODE OF RECURRENT MAJOR DEPRESSIVE DISORDER (HCC): ICD-10-CM

## 2024-01-05 DIAGNOSIS — E11.42 TYPE 2 DIABETES MELLITUS WITH DIABETIC POLYNEUROPATHY, WITHOUT LONG-TERM CURRENT USE OF INSULIN (HCC): ICD-10-CM

## 2024-01-05 LAB
LEFT EYE DIABETIC RETINOPATHY: ABNORMAL
LEFT EYE IMAGE QUALITY: ABNORMAL
LEFT EYE MACULAR EDEMA: ABNORMAL
LEFT EYE OTHER RETINOPATHY: ABNORMAL
RIGHT EYE DIABETIC RETINOPATHY: ABNORMAL
RIGHT EYE IMAGE QUALITY: ABNORMAL
RIGHT EYE MACULAR EDEMA: ABNORMAL
RIGHT EYE OTHER RETINOPATHY: ABNORMAL
SEVERITY (EYE EXAM): ABNORMAL

## 2024-01-05 PROCEDURE — G0008 ADMIN INFLUENZA VIRUS VAC: HCPCS | Performed by: INTERNAL MEDICINE

## 2024-01-05 PROCEDURE — G0439 PPPS, SUBSEQ VISIT: HCPCS | Performed by: INTERNAL MEDICINE

## 2024-01-05 PROCEDURE — 99214 OFFICE O/P EST MOD 30 MIN: CPT | Performed by: INTERNAL MEDICINE

## 2024-01-05 PROCEDURE — 90662 IIV NO PRSV INCREASED AG IM: CPT | Performed by: INTERNAL MEDICINE

## 2024-01-05 RX ORDER — ZOLPIDEM TARTRATE 5 MG/1
5 TABLET ORAL
Qty: 30 TABLET | Refills: 0 | Status: SHIPPED | OUTPATIENT
Start: 2024-01-05

## 2024-01-05 RX ORDER — TIOTROPIUM BROMIDE INHALATION SPRAY 1.56 UG/1
2 SPRAY, METERED RESPIRATORY (INHALATION) DAILY
Qty: 12 G | Refills: 3 | Status: SHIPPED | OUTPATIENT
Start: 2024-01-05

## 2024-01-05 RX ORDER — GABAPENTIN 300 MG/1
300 CAPSULE ORAL
Qty: 90 CAPSULE | Refills: 3 | Status: SHIPPED | OUTPATIENT
Start: 2024-01-05 | End: 2024-12-30

## 2024-01-05 SDOH — ECONOMIC STABILITY - TRANSPORTATION SECURITY: TRANSPORTATION INSECURITY: Z59.82

## 2024-01-05 NOTE — ASSESSMENT & PLAN NOTE
Lab Results   Component Value Date    HGBA1C 7.0 (A) 09/02/2022   Check the A1c and see how the patient is doing  Continue the Metformin 1000 mg BID  Continue Glimepiride 2 mg Qday
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
## 2024-01-05 NOTE — PROGRESS NOTES
Diabetic Foot Exam    Patient's shoes and socks removed.    Right Foot/Ankle   Right Foot Inspection  Skin Exam: skin normal and skin intact. No dry skin, no warmth, no callus, no erythema, no maceration, no abnormal color, no pre-ulcer, no ulcer and no callus.     Toe Exam:  no right toe deformity    Sensory   Monofilament testing: intact    Vascular  Capillary refills: < 3 seconds  The right DP pulse is 2+. The right PT pulse is 2+.     Left Foot/Ankle  Left Foot Inspection  Skin Exam: skin normal and skin intact. No dry skin, no warmth, no erythema, no maceration, normal color, no pre-ulcer, no ulcer and no callus.     Toe Exam: No left toe deformity.     Sensory   Monofilament testing: intact    Vascular  Capillary refills: < 3 seconds  The left DP pulse is 2+. The left PT pulse is 2+.     Assign Risk Category  No deformity present  No loss of protective sensation  No weak pulses  Risk: 0

## 2024-01-05 NOTE — PATIENT INSTRUCTIONS
Medicare Preventive Visit Patient Instructions  Thank you for completing your Welcome to Medicare Visit or Medicare Annual Wellness Visit today. Your next wellness visit will be due in one year (1/5/2025).  The screening/preventive services that you may require over the next 5-10 years are detailed below. Some tests may not apply to you based off risk factors and/or age. Screening tests ordered at today's visit but not completed yet may show as past due. Also, please note that scanned in results may not display below.  Preventive Screenings:  Service Recommendations Previous Testing/Comments   Colorectal Cancer Screening  Colonoscopy    Fecal Occult Blood Test (FOBT)/Fecal Immunochemical Test (FIT)  Fecal DNA/Cologuard Test  Flexible Sigmoidoscopy Age: 45-75 years old   Colonoscopy: every 10 years (May be performed more frequently if at higher risk)  OR  FOBT/FIT: every 1 year  OR  Cologuard: every 3 years  OR  Sigmoidoscopy: every 5 years  Screening may be recommended earlier than age 45 if at higher risk for colorectal cancer. Also, an individualized decision between you and your healthcare provider will decide whether screening between the ages of 76-85 would be appropriate. Colonoscopy: Not on file  FOBT/FIT: Not on file  Cologuard: Not on file  Sigmoidoscopy: Not on file          Prostate Cancer Screening Individualized decision between patient and health care provider in men between ages of 55-69   Medicare will cover every 12 months beginning on the day after your 50th birthday PSA: 6.2 ng/mL     Screening Current     Hepatitis C Screening Once for adults born between 1945 and 1965  More frequently in patients at high risk for Hepatitis C Hep C Antibody: 06/11/2020    Screening Current   Diabetes Screening 1-2 times per year if you're at risk for diabetes or have pre-diabetes Fasting glucose: 190 mg/dL (3/3/2023)  A1C: 7.0 (9/2/2022)  Screening Not Indicated  History Diabetes   Cholesterol Screening Once  every 5 years if you don't have a lipid disorder. May order more often based on risk factors. Lipid panel: 03/03/2023  Screening Not Indicated  History Lipid Disorder      Other Preventive Screenings Covered by Medicare:  Abdominal Aortic Aneurysm (AAA) Screening: covered once if your at risk. You're considered to be at risk if you have a family history of AAA or a male between the age of 65-75 who smoking at least 100 cigarettes in your lifetime.  Lung Cancer Screening: covers low dose CT scan once per year if you meet all of the following conditions: (1) Age 55-77; (2) No signs or symptoms of lung cancer; (3) Current smoker or have quit smoking within the last 15 years; (4) You have a tobacco smoking history of at least 20 pack years (packs per day x number of years you smoked); (5) You get a written order from a healthcare provider.  Glaucoma Screening: covered annually if you're considered high risk: (1) You have diabetes OR (2) Family history of glaucoma OR (3)  aged 50 and older OR (4)  American aged 65 and older  Osteoporosis Screening: covered every 2 years if you meet one of the following conditions: (1) Have a vertebral abnormality; (2) On glucocorticoid therapy for more than 3 months; (3) Have primary hyperparathyroidism; (4) On osteoporosis medications and need to assess response to drug therapy.  HIV Screening: covered annually if you're between the age of 15-65. Also covered annually if you are younger than 15 and older than 65 with risk factors for HIV infection. For pregnant patients, it is covered up to 3 times per pregnancy.    Immunizations:  Immunization Recommendations   Influenza Vaccine Annual influenza vaccination during flu season is recommended for all persons aged >= 6 months who do not have contraindications   Pneumococcal Vaccine   * Pneumococcal conjugate vaccine = PCV13 (Prevnar 13), PCV15 (Vaxneuvance), PCV20 (Prevnar 20)  * Pneumococcal polysaccharide vaccine  = PPSV23 (Pneumovax) Adults 19-65 yo with certain risk factors or if 65+ yo  If never received any pneumonia vaccine: recommend Prevnar 20 (PCV20)  Give PCV20 if previously received 1 dose of PCV13 or PPSV23   Hepatitis B Vaccine 3 dose series if at intermediate or high risk (ex: diabetes, end stage renal disease, liver disease)   Respiratory syncytial virus (RSV) Vaccine - COVERED BY MEDICARE PART D  * RSVPreF3 (Arexvy) CDC recommends that adults 60 years of age and older may receive a single dose of RSV vaccine using shared clinical decision-making (SCDM)   Tetanus (Td) Vaccine - COST NOT COVERED BY MEDICARE PART B Following completion of primary series, a booster dose should be given every 10 years to maintain immunity against tetanus. Td may also be given as tetanus wound prophylaxis.   Tdap Vaccine - COST NOT COVERED BY MEDICARE PART B Recommended at least once for all adults. For pregnant patients, recommended with each pregnancy.   Shingles Vaccine (Shingrix) - COST NOT COVERED BY MEDICARE PART B  2 shot series recommended in those 19 years and older who have or will have weakened immune systems or those 50 years and older     Health Maintenance Due:      Topic Date Due   • Colorectal Cancer Screening  04/14/2023   • Hepatitis C Screening  Completed     Immunizations Due:      Topic Date Due   • Hepatitis A Vaccine (1 of 2 - Risk 2-dose series) Never done   • Influenza Vaccine (1) 09/01/2023   • COVID-19 Vaccine (5 - 2023-24 season) 09/01/2023     Advance Directives   What are advance directives?  Advance directives are legal documents that state your wishes and plans for medical care. These plans are made ahead of time in case you lose your ability to make decisions for yourself. Advance directives can apply to any medical decision, such as the treatments you want, and if you want to donate organs.   What are the types of advance directives?  There are many types of advance directives, and each state has  rules about how to use them. You may choose a combination of any of the following:  Living will:  This is a written record of the treatment you want. You can also choose which treatments you do not want, which to limit, and which to stop at a certain time. This includes surgery, medicine, IV fluid, and tube feedings.   Durable power of  for healthcare (DPAHC):  This is a written record that states who you want to make healthcare choices for you when you are unable to make them for yourself. This person, called a proxy, is usually a family member or a friend. You may choose more than 1 proxy.  Do not resuscitate (DNR) order:  A DNR order is used in case your heart stops beating or you stop breathing. It is a request not to have certain forms of treatment, such as CPR. A DNR order may be included in other types of advance directives.  Medical directive:  This covers the care that you want if you are in a coma, near death, or unable to make decisions for yourself. You can list the treatments you want for each condition. Treatment may include pain medicine, surgery, blood transfusions, dialysis, IV or tube feedings, and a ventilator (breathing machine).  Values history:  This document has questions about your views, beliefs, and how you feel and think about life. This information can help others choose the care that you would choose.  Why are advance directives important?  An advance directive helps you control your care. Although spoken wishes may be used, it is better to have your wishes written down. Spoken wishes can be misunderstood, or not followed. Treatments may be given even if you do not want them. An advance directive may make it easier for your family to make difficult choices about your care.   Weight Management   Why it is important to manage your weight:  Being overweight increases your risk of health conditions such as heart disease, high blood pressure, type 2 diabetes, and certain types of  cancer. It can also increase your risk for osteoarthritis, sleep apnea, and other respiratory problems. Aim for a slow, steady weight loss. Even a small amount of weight loss can lower your risk of health problems.  How to lose weight safely:  A safe and healthy way to lose weight is to eat fewer calories and get regular exercise. You can lose up about 1 pound a week by decreasing the number of calories you eat by 500 calories each day.   Healthy meal plan for weight management:  A healthy meal plan includes a variety of foods, contains fewer calories, and helps you stay healthy. A healthy meal plan includes the following:  Eat whole-grain foods more often.  A healthy meal plan should contain fiber. Fiber is the part of grains, fruits, and vegetables that is not broken down by your body. Whole-grain foods are healthy and provide extra fiber in your diet. Some examples of whole-grain foods are whole-wheat breads and pastas, oatmeal, brown rice, and bulgur.  Eat a variety of vegetables every day.  Include dark, leafy greens such as spinach, kale, catalina greens, and mustard greens. Eat yellow and orange vegetables such as carrots, sweet potatoes, and winter squash.   Eat a variety of fruits every day.  Choose fresh or canned fruit (canned in its own juice or light syrup) instead of juice. Fruit juice has very little or no fiber.  Eat low-fat dairy foods.  Drink fat-free (skim) milk or 1% milk. Eat fat-free yogurt and low-fat cottage cheese. Try low-fat cheeses such as mozzarella and other reduced-fat cheeses.  Choose meat and other protein foods that are low in fat.  Choose beans or other legumes such as split peas or lentils. Choose fish, skinless poultry (chicken or turkey), or lean cuts of red meat (beef or pork). Before you cook meat or poultry, cut off any visible fat.   Use less fat and oil.  Try baking foods instead of frying them. Add less fat, such as margarine, sour cream, regular salad dressing and  mayonnaise to foods. Eat fewer high-fat foods. Some examples of high-fat foods include french fries, doughnuts, ice cream, and cakes.  Eat fewer sweets.  Limit foods and drinks that are high in sugar. This includes candy, cookies, regular soda, and sweetened drinks.  Exercise:  Exercise at least 30 minutes per day on most days of the week. Some examples of exercise include walking, biking, dancing, and swimming. You can also fit in more physical activity by taking the stairs instead of the elevator or parking farther away from stores. Ask your healthcare provider about the best exercise plan for you.      © Copyright [employer] 2018 Information is for End User's use only and may not be sold, redistributed or otherwise used for commercial purposes. All illustrations and images included in CareNotes® are the copyrighted property of A.D.A.M., Inc. or [employer]

## 2024-01-05 NOTE — PROGRESS NOTES
HCC coding opportunities    E11.36, I11.0     Chart Reviewed number of suggestions sent to Provider: 2     Patients Insurance     Medicare Insurance: [facility] [employer] Ad[employer]age

## 2024-01-05 NOTE — ASSESSMENT & PLAN NOTE
Wt Readings from Last 3 Encounters:   01/05/24 88.1 kg (194 lb 3.2 oz)   03/03/23 79.8 kg (176 lb)   09/02/22 77.3 kg (170 lb 6 oz)   The patient notes no SOB or edema  Weight gain in the area of mid section

## 2024-01-22 DIAGNOSIS — F33.1 MODERATE EPISODE OF RECURRENT MAJOR DEPRESSIVE DISORDER (HCC): ICD-10-CM

## 2024-01-22 RX ORDER — ESCITALOPRAM OXALATE 20 MG/1
20 TABLET ORAL DAILY
Qty: 90 TABLET | Refills: 1 | Status: SHIPPED | OUTPATIENT
Start: 2024-01-22

## 2024-01-24 DIAGNOSIS — I10 ESSENTIAL HYPERTENSION: ICD-10-CM

## 2024-01-25 RX ORDER — LISINOPRIL 40 MG/1
40 TABLET ORAL 2 TIMES DAILY
Qty: 180 TABLET | Refills: 0 | Status: SHIPPED | OUTPATIENT
Start: 2024-01-25 | End: 2024-07-23

## 2024-02-01 DIAGNOSIS — G47.09 OTHER INSOMNIA: ICD-10-CM

## 2024-02-01 RX ORDER — ZOLPIDEM TARTRATE 5 MG/1
5 TABLET ORAL
Qty: 30 TABLET | Refills: 0 | Status: SHIPPED | OUTPATIENT
Start: 2024-02-01

## 2024-02-05 ENCOUNTER — TELEPHONE (OUTPATIENT)
Dept: INTERNAL MEDICINE CLINIC | Facility: CLINIC | Age: 69
End: 2024-02-05

## 2024-02-08 ENCOUNTER — TELEPHONE (OUTPATIENT)
Dept: INTERNAL MEDICINE CLINIC | Facility: CLINIC | Age: 69
End: 2024-02-08

## 2024-02-08 NOTE — TELEPHONE ENCOUNTER
Notify the patient that he will have to try (and fail) Rozerem and Doxepin prior to prescribing zolpidem

## 2024-02-08 NOTE — TELEPHONE ENCOUNTER
[facility] called in regards to the medication Zolpidem. I called them back and left a detailed voice mail message at Direct Line [phone]. They were asking if Kaveh had tried and failed on the Ramelteon which is a generic Rozerem and also Doxepin which is generic for Silenor. And if so why can he not take those?   I am not seeing those on his medication list anywhere. Please advise   Thank you

## 2024-02-09 ENCOUNTER — TELEPHONE (OUTPATIENT)
Dept: INTERNAL MEDICINE CLINIC | Facility: CLINIC | Age: 69
End: 2024-02-09

## 2024-02-09 DIAGNOSIS — G47.09 OTHER INSOMNIA: Primary | ICD-10-CM

## 2024-02-09 RX ORDER — RAMELTEON 8 MG/1
8 TABLET ORAL
Qty: 30 TABLET | Refills: 5 | Status: SHIPPED | OUTPATIENT
Start: 2024-02-09 | End: 2024-08-07

## 2024-02-09 NOTE — TELEPHONE ENCOUNTER
Spoke with Art, He would like to try whatever he needs to try. He stated the Ambien isn't working enough anyway as he has to take two tablets to fall asleep. Also mentioned tried trazodone in the past but that did not work.

## 2024-02-09 NOTE — TELEPHONE ENCOUNTER
Patients insurance called stating that the ambien was denied after the prior auth and sending in more information. Is there something else that you can send over? Thank you

## 2024-02-21 PROBLEM — J18.9 PNEUMONIA: Status: RESOLVED | Noted: 2020-05-11 | Resolved: 2024-02-21

## 2024-02-21 PROBLEM — J20.1 ACUTE BRONCHITIS DUE TO HAEMOPHILUS INFLUENZAE: Status: RESOLVED | Noted: 2019-05-20 | Resolved: 2024-02-21

## 2024-02-21 PROBLEM — Z12.11 ENCOUNTER FOR SCREENING FOR MALIGNANT NEOPLASM OF COLON: Status: RESOLVED | Noted: 2020-05-11 | Resolved: 2024-02-21

## 2024-02-26 DIAGNOSIS — E11.42 DIABETIC POLYNEUROPATHY ASSOCIATED WITH TYPE 2 DIABETES MELLITUS (HCC): ICD-10-CM

## 2024-02-26 DIAGNOSIS — E78.2 MIXED HYPERLIPIDEMIA: ICD-10-CM

## 2024-02-27 RX ORDER — GABAPENTIN 300 MG/1
300 CAPSULE ORAL
Qty: 90 CAPSULE | Refills: 1 | Status: SHIPPED | OUTPATIENT
Start: 2024-02-27 | End: 2025-02-21

## 2024-02-27 RX ORDER — ATORVASTATIN CALCIUM 20 MG/1
20 TABLET, FILM COATED ORAL
Qty: 90 TABLET | Refills: 1 | Status: SHIPPED | OUTPATIENT
Start: 2024-02-27

## 2024-04-03 DIAGNOSIS — G89.29 CHRONIC BILATERAL LOW BACK PAIN WITHOUT SCIATICA: ICD-10-CM

## 2024-04-03 DIAGNOSIS — M54.50 CHRONIC BILATERAL LOW BACK PAIN WITHOUT SCIATICA: ICD-10-CM

## 2024-04-03 DIAGNOSIS — E78.2 MIXED HYPERLIPIDEMIA: ICD-10-CM

## 2024-04-03 DIAGNOSIS — I10 ESSENTIAL HYPERTENSION: ICD-10-CM

## 2024-04-03 RX ORDER — ATORVASTATIN CALCIUM 20 MG/1
20 TABLET, FILM COATED ORAL
Qty: 90 TABLET | Refills: 1 | Status: SHIPPED | OUTPATIENT
Start: 2024-04-03

## 2024-04-03 RX ORDER — AMLODIPINE BESYLATE 10 MG/1
10 TABLET ORAL DAILY
Qty: 90 TABLET | Refills: 1 | Status: SHIPPED | OUTPATIENT
Start: 2024-04-03

## 2024-04-03 RX ORDER — CELECOXIB 100 MG/1
100 CAPSULE ORAL 2 TIMES DAILY
Qty: 180 CAPSULE | Refills: 1 | Status: SHIPPED | OUTPATIENT
Start: 2024-04-03

## 2024-04-06 DIAGNOSIS — N52.8 OTHER MALE ERECTILE DYSFUNCTION: ICD-10-CM

## 2024-04-08 RX ORDER — SILDENAFIL CITRATE 20 MG/1
20 TABLET ORAL DAILY PRN
Qty: 30 TABLET | Refills: 0 | Status: SHIPPED | OUTPATIENT
Start: 2024-04-08 | End: 2024-05-08

## 2024-04-15 ENCOUNTER — TELEPHONE (OUTPATIENT)
Dept: FAMILY MEDICINE CLINIC | Facility: CLINIC | Age: 69
End: 2024-04-15

## 2024-04-15 ENCOUNTER — TELEPHONE (OUTPATIENT)
Dept: INTERNAL MEDICINE CLINIC | Facility: CLINIC | Age: 69
End: 2024-04-15

## 2024-04-15 NOTE — TELEPHONE ENCOUNTER
"Message was left on voicemail    \"My name is Tanner Patel, birth date 1955. I'm trying to return your call, All right? I missed my appointment today and talk to the nurse about problems I've been having. So I would imagine you tried to call me back and I couldn't get to my phone in time. I don't carry my cell phone with me. I'm sorry about that. But you can call me back at [phone]. My name is Tanner Carlson. Thank you\"  "

## 2024-04-15 NOTE — TELEPHONE ENCOUNTER
Patient called to reschedule his appointment with you today due to the bus not coming to bring him to the appointment. Your next available was in June. He did reschedule to June, but wanted to make you aware that he doesn't feel the ramelton is helping him sleep. He wakes up in the middle of the night and feels full of mucous. He states he is afraid he is going to start to drink again to help him sleep. He wants to know if you think he should try something to help with the mucous he has or if you want to do another medication to help him sleep?

## 2024-04-16 ENCOUNTER — OFFICE VISIT (OUTPATIENT)
Dept: INTERNAL MEDICINE CLINIC | Facility: CLINIC | Age: 69
End: 2024-04-16
Payer: COMMERCIAL

## 2024-04-16 VITALS
SYSTOLIC BLOOD PRESSURE: 138 MMHG | DIASTOLIC BLOOD PRESSURE: 86 MMHG | BODY MASS INDEX: 26.49 KG/M2 | OXYGEN SATURATION: 97 % | HEART RATE: 82 BPM | HEIGHT: 68 IN | TEMPERATURE: 97.1 F | WEIGHT: 174.8 LBS

## 2024-04-16 DIAGNOSIS — G47.61 PLMD (PERIODIC LIMB MOVEMENT DISORDER): ICD-10-CM

## 2024-04-16 DIAGNOSIS — E11.42 DIABETIC POLYNEUROPATHY ASSOCIATED WITH TYPE 2 DIABETES MELLITUS (HCC): ICD-10-CM

## 2024-04-16 DIAGNOSIS — Z72.0 NICOTINE ABUSE: ICD-10-CM

## 2024-04-16 DIAGNOSIS — G89.29 CHRONIC BILATERAL LOW BACK PAIN WITHOUT SCIATICA: ICD-10-CM

## 2024-04-16 DIAGNOSIS — J44.1 COPD EXACERBATION (HCC): ICD-10-CM

## 2024-04-16 DIAGNOSIS — F41.9 ANXIETY: Primary | ICD-10-CM

## 2024-04-16 DIAGNOSIS — M54.50 CHRONIC BILATERAL LOW BACK PAIN WITHOUT SCIATICA: ICD-10-CM

## 2024-04-16 DIAGNOSIS — I25.5 ISCHEMIC CARDIOMYOPATHY: ICD-10-CM

## 2024-04-16 DIAGNOSIS — F33.1 MODERATE EPISODE OF RECURRENT MAJOR DEPRESSIVE DISORDER (HCC): ICD-10-CM

## 2024-04-16 PROCEDURE — G2211 COMPLEX E/M VISIT ADD ON: HCPCS | Performed by: INTERNAL MEDICINE

## 2024-04-16 PROCEDURE — 99214 OFFICE O/P EST MOD 30 MIN: CPT | Performed by: INTERNAL MEDICINE

## 2024-04-16 RX ORDER — HYDROXYZINE PAMOATE 25 MG/1
25 CAPSULE ORAL 3 TIMES DAILY PRN
Qty: 60 CAPSULE | Refills: 0 | Status: SHIPPED | OUTPATIENT
Start: 2024-04-16 | End: 2024-10-13

## 2024-04-16 RX ORDER — CELECOXIB 200 MG/1
200 CAPSULE ORAL DAILY PRN
Qty: 90 CAPSULE | Refills: 1 | Status: SHIPPED | OUTPATIENT
Start: 2024-04-16 | End: 2024-10-13

## 2024-04-16 RX ORDER — NITROGLYCERIN 0.4 MG/1
0.4 TABLET SUBLINGUAL
Qty: 30 TABLET | Refills: 1 | Status: SHIPPED | OUTPATIENT
Start: 2024-04-16 | End: 2024-04-22

## 2024-04-16 RX ORDER — BUPROPION HYDROCHLORIDE 300 MG/1
300 TABLET ORAL EVERY MORNING
Qty: 90 TABLET | Refills: 1 | Status: SHIPPED | OUTPATIENT
Start: 2024-04-16 | End: 2024-10-13

## 2024-04-16 RX ORDER — TIOTROPIUM BROMIDE 18 UG/1
18 CAPSULE ORAL; RESPIRATORY (INHALATION) DAILY
Qty: 90 CAPSULE | Refills: 1 | Status: SHIPPED | OUTPATIENT
Start: 2024-04-16 | End: 2024-10-13

## 2024-04-16 RX ORDER — GABAPENTIN 300 MG/1
300 CAPSULE ORAL 2 TIMES DAILY
Qty: 180 CAPSULE | Refills: 1 | Status: SHIPPED | OUTPATIENT
Start: 2024-04-16 | End: 2024-10-13

## 2024-04-16 RX ORDER — ROPINIROLE 0.5 MG/1
0.5 TABLET, FILM COATED ORAL
Qty: 90 TABLET | Refills: 1 | Status: SHIPPED | OUTPATIENT
Start: 2024-04-16 | End: 2024-10-13
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
## 2024-04-21 DIAGNOSIS — I10 ESSENTIAL HYPERTENSION: ICD-10-CM

## 2024-04-22 DIAGNOSIS — I25.5 ISCHEMIC CARDIOMYOPATHY: ICD-10-CM

## 2024-04-22 RX ORDER — NITROGLYCERIN 0.4 MG/1
0.4 TABLET SUBLINGUAL
Qty: 25 TABLET | Refills: 1 | Status: SHIPPED | OUTPATIENT
Start: 2024-04-22 | End: 2024-05-22

## 2024-04-22 RX ORDER — LISINOPRIL 40 MG/1
40 TABLET ORAL 2 TIMES DAILY
Qty: 180 TABLET | Refills: 1 | Status: SHIPPED | OUTPATIENT
Start: 2024-04-22 | End: 2024-10-19

## 2024-05-02 ENCOUNTER — RA CDI HCC (OUTPATIENT)
Dept: OTHER | Facility: HOSPITAL | Age: 69
End: 2024-05-02

## 2024-05-06 ENCOUNTER — RA CDI HCC (OUTPATIENT)
Dept: OTHER | Facility: HOSPITAL | Age: 69
End: 2024-05-06

## 2024-05-06 NOTE — PROGRESS NOTES
HCC coding opportunities     I11.0     Chart Reviewed number of suggestions sent to Provider: 1   GR    Patients Insurance     Medicare Insurance: [facility] [employer]age

## 2024-05-14 DIAGNOSIS — E11.9 DIABETES MELLITUS TYPE 2 IN NONOBESE (HCC): ICD-10-CM

## 2024-05-15 DIAGNOSIS — I10 ESSENTIAL HYPERTENSION: ICD-10-CM

## 2024-05-15 DIAGNOSIS — E11.42 DIABETIC POLYNEUROPATHY ASSOCIATED WITH TYPE 2 DIABETES MELLITUS (HCC): ICD-10-CM

## 2024-05-15 NOTE — TELEPHONE ENCOUNTER
Patient needs updated blood work and has previously placed orders. Please contact patient to go for labs. Courtesy refill provided.

## 2024-05-16 RX ORDER — GABAPENTIN 300 MG/1
300 CAPSULE ORAL 2 TIMES DAILY
Qty: 60 CAPSULE | Refills: 0 | Status: SHIPPED | OUTPATIENT
Start: 2024-05-16 | End: 2024-11-12

## 2024-05-16 RX ORDER — CARVEDILOL 3.12 MG/1
TABLET ORAL
Qty: 180 TABLET | Refills: 1 | Status: SHIPPED | OUTPATIENT
Start: 2024-05-16

## 2024-05-23 ENCOUNTER — RA CDI HCC (OUTPATIENT)
Dept: OTHER | Facility: HOSPITAL | Age: 69
End: 2024-05-23

## 2024-06-01 DIAGNOSIS — E11.42 DIABETIC POLYNEUROPATHY ASSOCIATED WITH TYPE 2 DIABETES MELLITUS (HCC): ICD-10-CM

## 2024-06-01 DIAGNOSIS — F41.9 ANXIETY: ICD-10-CM

## 2024-06-01 RX ORDER — HYDROXYZINE PAMOATE 25 MG/1
25 CAPSULE ORAL 3 TIMES DAILY PRN
Qty: 60 CAPSULE | Refills: 5 | Status: SHIPPED | OUTPATIENT
Start: 2024-06-01 | End: 2024-11-28

## 2024-06-02 RX ORDER — GABAPENTIN 300 MG/1
300 CAPSULE ORAL 2 TIMES DAILY
Qty: 60 CAPSULE | Refills: 0 | Status: SHIPPED | OUTPATIENT
Start: 2024-06-02 | End: 2024-11-29

## 2024-06-11 DIAGNOSIS — E11.9 DIABETES MELLITUS TYPE 2 IN NONOBESE (HCC): ICD-10-CM

## 2024-06-20 ENCOUNTER — RA CDI HCC (OUTPATIENT)
Dept: OTHER | Facility: HOSPITAL | Age: 69
End: 2024-06-20

## 2024-06-20 ENCOUNTER — VBI (OUTPATIENT)
Dept: ADMINISTRATIVE | Facility: OTHER | Age: 69
End: 2024-06-20

## 2024-06-20 NOTE — TELEPHONE ENCOUNTER
06/20/24 3:27 PM     Chart reviewed for Diabetic Eye Exam was/were not submitted to the patient's insurance.     Denita Lovelace MA   [facility]

## 2024-07-01 ENCOUNTER — OFFICE VISIT (OUTPATIENT)
Dept: INTERNAL MEDICINE CLINIC | Facility: CLINIC | Age: 69
End: 2024-07-01
Payer: COMMERCIAL

## 2024-07-01 VITALS
TEMPERATURE: 97 F | DIASTOLIC BLOOD PRESSURE: 76 MMHG | SYSTOLIC BLOOD PRESSURE: 132 MMHG | OXYGEN SATURATION: 96 % | WEIGHT: 187.2 LBS | HEIGHT: 68 IN | HEART RATE: 61 BPM | BODY MASS INDEX: 28.37 KG/M2

## 2024-07-01 DIAGNOSIS — E11.9 TYPE 2 DIABETES MELLITUS WITHOUT COMPLICATION, WITHOUT LONG-TERM CURRENT USE OF INSULIN (HCC): ICD-10-CM

## 2024-07-01 DIAGNOSIS — Z13.5 SCREENING FOR DIABETIC RETINOPATHY: ICD-10-CM

## 2024-07-01 DIAGNOSIS — E11.42 TYPE 2 DIABETES MELLITUS WITH DIABETIC POLYNEUROPATHY, WITHOUT LONG-TERM CURRENT USE OF INSULIN (HCC): ICD-10-CM

## 2024-07-01 DIAGNOSIS — F41.9 ANXIETY: Primary | ICD-10-CM

## 2024-07-01 DIAGNOSIS — Z12.11 SCREENING FOR COLON CANCER: ICD-10-CM

## 2024-07-01 DIAGNOSIS — F17.211 CIGARETTE NICOTINE DEPENDENCE IN REMISSION: ICD-10-CM

## 2024-07-01 PROCEDURE — G2211 COMPLEX E/M VISIT ADD ON: HCPCS | Performed by: INTERNAL MEDICINE

## 2024-07-01 PROCEDURE — 99214 OFFICE O/P EST MOD 30 MIN: CPT | Performed by: INTERNAL MEDICINE

## 2024-07-01 RX ORDER — GLIMEPIRIDE 2 MG/1
3 TABLET ORAL
Qty: 135 TABLET | Refills: 1 | Status: SHIPPED | OUTPATIENT
Start: 2024-07-01 | End: 2024-12-28

## 2024-07-01 RX ORDER — CLONAZEPAM 0.5 MG/1
0.5 TABLET ORAL 2 TIMES DAILY
Qty: 60 TABLET | Refills: 0 | Status: SHIPPED | OUTPATIENT
Start: 2024-07-01 | End: 2024-07-31

## 2024-07-01 RX ORDER — DULOXETIN HYDROCHLORIDE 20 MG/1
20 CAPSULE, DELAYED RELEASE ORAL DAILY
Qty: 30 CAPSULE | Refills: 0 | Status: SHIPPED | OUTPATIENT
Start: 2024-07-01 | End: 2024-07-31

## 2024-07-01 RX ORDER — TIOTROPIUM BROMIDE INHALATION SPRAY 1.56 UG/1
2 SPRAY, METERED RESPIRATORY (INHALATION) DAILY
COMMUNITY
Start: 2024-06-27

## 2024-07-01 NOTE — PROGRESS NOTES
Diabetic Foot Exam    Patient's shoes and socks removed.    Right Foot/Ankle   Right Foot Inspection  Skin Exam: skin normal, skin intact, callus and callus. No dry skin, no warmth, no erythema, no maceration, no abnormal color, no pre-ulcer and no ulcer.     Toe Exam: ROM and strength within normal limits.  no right toe deformity    Sensory   Monofilament testing: intact    Vascular  Capillary refills: < 3 seconds  The right DP pulse is 2+. The right PT pulse is 2+.     Left Foot/Ankle  Left Foot Inspection  Skin Exam: skin normal, skin intact and callus. No dry skin, no warmth, no erythema, no maceration, normal color, no pre-ulcer and no ulcer.     Toe Exam: ROM and strength within normal limits. No left toe deformity.     Sensory   Monofilament testing: intact    Vascular  Capillary refills: < 3 seconds  The left DP pulse is 2+. The left PT pulse is 2+.     Assign Risk Category  No deformity present  No loss of protective sensation  No weak pulses  Risk: 0  Ambulatory Visit  Name: Tanner Patel      : 1955      MRN: [id number]  Encounter Provider: Lisandro Berman DO  Encounter Date: 2024   Encounter department: [facility]    Assessment & Plan   1. Anxiety  Assessment & Plan:  Start Duloxetine 20 mg Qday  Start Clonazepam 0.5 mg BID  Orders:  -     DULoxetine (CYMBALTA) 20 mg capsule; Take 1 capsule (20 mg total) by mouth daily  -     clonazePAM (KlonoPIN) 0.5 mg tablet; Take 1 tablet (0.5 mg total) by mouth 2 (two) times a day  2. Type 2 diabetes mellitus with diabetic polyneuropathy, without long-term current use of insulin ([facility])  Comments:  Continue Glimepiride  requested the patient follow up on labs previously ordered  Orders:  -     Diabetic foot exam; Future  3. Cigarette nicotine dependence in remission  Comments:  Declined smoking cessation  4. Screening for diabetic retinopathy  -     IRIS Diabetic eye exam  5. Screening for colon cancer  -     Cologuard  6. Type 2  diabetes mellitus without complication, without long-term current use of insulin ([facility])  Comments:  POCT hgba1c 7.2  Orders:  -     glimepiride (AMARYL) 2 mg tablet; Take 1.5 tablets (3 mg total) by mouth daily with breakfast    Depression Screening Follow-up Plan: Patient's depression screening was positive with a PHQ-2 score of . Their PHQ-9 score was 10. Patient assessed for underlying major depression. They have no active suicidal ideations. Brief counseling provided and recommend additional follow-up/re-evaluation next office visit.     Depression Screening and Follow-up Plan: Patient's depression screening was positive with a PHQ-9 score of 10. Patient assessed for underlying major depression. Brief counseling provided and recommend additional follow-up/re-evaluation next office visit.       History of Present Illness     The patient notes issues of depression and anxiety.  The patient states that he has pruritus noted on the posterior torso and extremities.  The patient declined CT lung screen, but is amendable Iris and Cologuard.  The patient has not followed up on labs and I noted that he can get the set of labs placed in January this year anytime he wants.        Review of Systems   Constitutional:  Negative for chills, fatigue and fever.   HENT: Negative.     Respiratory:  Negative for cough, chest tightness and shortness of breath.    Cardiovascular:  Negative for chest pain and palpitations.   Gastrointestinal:  Negative for abdominal pain, constipation, diarrhea, nausea and vomiting.   Genitourinary: Negative.    Musculoskeletal:  Negative for back pain and myalgias.   Skin: Negative.    Neurological: Negative.    Psychiatric/Behavioral:  Negative for dysphoric mood. The patient is not nervous/anxious.      Current Outpatient Medications on File Prior to Visit   Medication Sig Dispense Refill   • amLODIPine (NORVASC) 10 mg tablet Take 1 tablet (10 mg total) by mouth daily 90 tablet 1   • aspirin 81 MG  tablet Take 1 tablet (81 mg total) by mouth daily for 30 days 30 tablet 0   • atorvastatin (LIPITOR) 20 mg tablet Take 1 tablet (20 mg total) by mouth daily at bedtime 90 tablet 1   • buPROPion (WELLBUTRIN XL) 300 mg 24 hr tablet Take 1 tablet (300 mg total) by mouth every morning 90 tablet 1   • carvedilol (COREG) 3.125 mg tablet TAKE 1 TABLET 2 TIMES DAILY 180 tablet 1   • celecoxib (CeleBREX) 200 mg capsule Take 1 capsule (200 mg total) by mouth daily as needed for mild pain 90 capsule 1   • Cholecalciferol (VITAMIN D3) 125 MCG (5000 UT) TBDP Take by mouth     • escitalopram (LEXAPRO) 20 mg tablet TAKE 1 TABLET (20 MG TOTAL) BY MOUTH DAILY 90 tablet 1   • gabapentin (NEURONTIN) 300 mg capsule Take 1 capsule (300 mg total) by mouth 2 (two) times a day 60 capsule 0   • hydrOXYzine pamoate (VISTARIL) 25 mg capsule Take 1 capsule (25 mg total) by mouth 3 (three) times a day as needed for itching 60 capsule 5   • lisinopril (ZESTRIL) 40 mg tablet TAKE 1 TABLET (40 MG TOTAL) BY MOUTH 2 (TWO) TIMES A  tablet 1   • metFORMIN (GLUCOPHAGE) 1000 MG tablet Take 1 tablet (1,000 mg total) by mouth 2 (two) times a day 60 tablet 0   • montelukast (SINGULAIR) 10 mg tablet Take 1 tablet (10 mg total) by mouth daily at bedtime 30 tablet 5   • multivitamin (THERAGRAN) TABS Take 1 tablet by mouth daily     • nitroglycerin (NITROSTAT) 0.4 mg SL tablet PLACE 1 TABLET (0.4 MG TOTAL) UNDER THE TONGUE EVERY 5 (FIVE) MINUTES AS NEEDED FOR CHEST PAIN 25 tablet 1   • rOPINIRole (REQUIP) 0.5 mg tablet Take 1 tablet (0.5 mg total) by mouth daily at bedtime 90 tablet 1   • sildenafil (REVATIO) 20 mg tablet Take 1 tablet (20 mg total) by mouth daily as needed (as needed) 30 tablet 0   • Spiriva Respimat 1.25 MCG/ACT AERS inhaler Inhale 2 puffs daily     • tiotropium (Spiriva HandiHaler) 18 mcg inhalation capsule Place 1 capsule (18 mcg total) into inhaler and inhale daily 90 capsule 1   • [DISCONTINUED] glimepiride (AMARYL) 2 mg tablet  "TAKE 1.5 TABLETS (3 MG TOTAL) BY MOUTH DAILY WITH BREAKFAST 135 tablet 1     No current facility-administered medications on file prior to visit.      Objective     /76   Pulse 61   Temp (!) 97 °F (36.1 °C)   Ht 5' 8\" (1.727 m)   Wt 84.9 kg (187 lb 3.2 oz)   SpO2 96%   BMI 28.46 kg/m²     Physical Exam  Vitals and nursing note reviewed.   Constitutional:       General: He is not in acute distress.     Appearance: He is well-developed.   HENT:      Head: Normocephalic and atraumatic.   Eyes:      Conjunctiva/sclera: Conjunctivae normal.   Cardiovascular:      Rate and Rhythm: Normal rate and regular rhythm.      Pulses: no weak pulses.           Dorsalis pedis pulses are 2+ on the right side and 2+ on the left side.        Posterior tibial pulses are 2+ on the right side and 2+ on the left side.      Heart sounds: No murmur heard.  Pulmonary:      Effort: Pulmonary effort is normal. No respiratory distress.      Breath sounds: Normal breath sounds.   Abdominal:      Palpations: Abdomen is soft.      Tenderness: There is no abdominal tenderness.   Musculoskeletal:         General: No swelling.      Cervical back: Neck supple.   Feet:      Right foot:      Skin integrity: Callus present. No ulcer, skin breakdown, erythema, warmth or dry skin.      Left foot:      Skin integrity: Callus present. No ulcer, skin breakdown, erythema, warmth or dry skin.   Skin:     General: Skin is warm and dry.      Capillary Refill: Capillary refill takes less than 2 seconds.   Neurological:      Mental Status: He is alert.   Psychiatric:         Mood and Affect: Mood normal.       Administrative Statements   I have spent a total time of 30 minutes in caring for this patient on the day of the visit/encounter including ImpressionsAmbulatory Visit  Name: Tanner Patel      : 1955      MRN: [id number]  Encounter Provider: Lisandro Berman DO  Encounter Date: 2024   Encounter [facility]    Assessment & Plan   1. Anxiety  Assessment & Plan:  Start Duloxetine 20 mg Qday  Start Clonazepam 0.5 mg BID  Orders:  -     DULoxetine (CYMBALTA) 20 mg capsule; Take 1 capsule (20 mg total) by mouth daily  -     clonazePAM (KlonoPIN) 0.5 mg tablet; Take 1 tablet (0.5 mg total) by mouth 2 (two) times a day  2. Type 2 diabetes mellitus with diabetic polyneuropathy, without long-term current use of insulin ([facility])  Comments:  Continue Glimepiride  requested the patient follow up on labs previously ordered  Orders:  -     Diabetic foot exam; Future  3. Cigarette nicotine dependence in remission  Comments:  Declined smoking cessation  4. Screening for diabetic retinopathy  -     IRIS Diabetic eye exam  5. Screening for colon cancer  -     Cologuard  6. Type 2 diabetes mellitus without complication, without long-term current use of insulin ([facility])  Comments:  POCT hgba1c 7.2  Orders:  -     glimepiride (AMARYL) 2 mg tablet; Take 1.5 tablets (3 mg total) by mouth daily with breakfast    [unfilled]  History of Present Illness     [unfilled]    [unfilled]  Current Outpatient Medications on File Prior to Visit   Medication Sig Dispense Refill   • amLODIPine (NORVASC) 10 mg tablet Take 1 tablet (10 mg total) by mouth daily 90 tablet 1   • aspirin 81 MG tablet Take 1 tablet (81 mg total) by mouth daily for 30 days 30 tablet 0   • atorvastatin (LIPITOR) 20 mg tablet Take 1 tablet (20 mg total) by mouth daily at bedtime 90 tablet 1   • buPROPion (WELLBUTRIN XL) 300 mg 24 hr tablet Take 1 tablet (300 mg total) by mouth every morning 90 tablet 1   • carvedilol (COREG) 3.125 mg tablet TAKE 1 TABLET 2 TIMES DAILY 180 tablet 1   • celecoxib (CeleBREX) 200 mg capsule Take 1 capsule (200 mg total) by mouth daily as needed for mild pain 90 capsule 1   • Cholecalciferol (VITAMIN D3) 125 MCG (5000 UT) TBDP Take by mouth     • escitalopram (LEXAPRO) 20 mg tablet TAKE 1 TABLET (20 MG TOTAL) BY MOUTH DAILY 90 tablet 1   •  "gabapentin (NEURONTIN) 300 mg capsule Take 1 capsule (300 mg total) by mouth 2 (two) times a day 60 capsule 0   • hydrOXYzine pamoate (VISTARIL) 25 mg capsule Take 1 capsule (25 mg total) by mouth 3 (three) times a day as needed for itching 60 capsule 5   • lisinopril (ZESTRIL) 40 mg tablet TAKE 1 TABLET (40 MG TOTAL) BY MOUTH 2 (TWO) TIMES A  tablet 1   • metFORMIN (GLUCOPHAGE) 1000 MG tablet Take 1 tablet (1,000 mg total) by mouth 2 (two) times a day 60 tablet 0   • montelukast (SINGULAIR) 10 mg tablet Take 1 tablet (10 mg total) by mouth daily at bedtime 30 tablet 5   • multivitamin (THERAGRAN) TABS Take 1 tablet by mouth daily     • nitroglycerin (NITROSTAT) 0.4 mg SL tablet PLACE 1 TABLET (0.4 MG TOTAL) UNDER THE TONGUE EVERY 5 (FIVE) MINUTES AS NEEDED FOR CHEST PAIN 25 tablet 1   • rOPINIRole (REQUIP) 0.5 mg tablet Take 1 tablet (0.5 mg total) by mouth daily at bedtime 90 tablet 1   • sildenafil (REVATIO) 20 mg tablet Take 1 tablet (20 mg total) by mouth daily as needed (as needed) 30 tablet 0   • Spiriva Respimat 1.25 MCG/ACT AERS inhaler Inhale 2 puffs daily     • tiotropium (Spiriva HandiHaler) 18 mcg inhalation capsule Place 1 capsule (18 mcg total) into inhaler and inhale daily 90 capsule 1   • [DISCONTINUED] glimepiride (AMARYL) 2 mg tablet TAKE 1.5 TABLETS (3 MG TOTAL) BY MOUTH DAILY WITH BREAKFAST 135 tablet 1     No current facility-administered medications on file prior to visit.      Objective     /76   Pulse 61   Temp (!) 97 °F (36.1 °C)   Ht 5' 8\" (1.727 m)   Wt 84.9 kg (187 lb 3.2 oz)   SpO2 96%   BMI 28.46 kg/m²     [unfilled]  Administrative Statements   I have spent a total time of 30 minutes in caring for this patient on the day of the visit/encounter including Impressions.      .        "

## 2024-07-02 ENCOUNTER — VBI (OUTPATIENT)
Dept: ADMINISTRATIVE | Facility: OTHER | Age: 69
End: 2024-07-02

## 2024-07-02 NOTE — TELEPHONE ENCOUNTER
07/02/24 9:55 AM     Chart reviewed for Diabetic Eye Exam was/were not submitted to the patient's insurance.     Denita Lovelace MA   [facility]

## 2024-07-04 DIAGNOSIS — E11.42 DIABETIC POLYNEUROPATHY ASSOCIATED WITH TYPE 2 DIABETES MELLITUS (HCC): ICD-10-CM

## 2024-07-05 RX ORDER — GABAPENTIN 300 MG/1
300 CAPSULE ORAL 2 TIMES DAILY
Qty: 60 CAPSULE | Refills: 0 | Status: SHIPPED | OUTPATIENT
Start: 2024-07-05 | End: 2025-01-01

## 2024-07-15 DIAGNOSIS — F33.1 MODERATE EPISODE OF RECURRENT MAJOR DEPRESSIVE DISORDER (HCC): ICD-10-CM

## 2024-07-15 RX ORDER — ESCITALOPRAM OXALATE 20 MG/1
20 TABLET ORAL DAILY
Qty: 90 TABLET | Refills: 1 | Status: SHIPPED | OUTPATIENT
Start: 2024-07-15

## 2024-07-18 ENCOUNTER — RA CDI HCC (OUTPATIENT)
Dept: OTHER | Facility: HOSPITAL | Age: 69
End: 2024-07-18

## 2024-07-28 DIAGNOSIS — F41.9 ANXIETY: ICD-10-CM

## 2024-07-29 RX ORDER — DULOXETIN HYDROCHLORIDE 20 MG/1
20 CAPSULE, DELAYED RELEASE ORAL DAILY
Qty: 30 CAPSULE | Refills: 5 | Status: SHIPPED | OUTPATIENT
Start: 2024-07-29 | End: 2025-01-25

## 2024-08-12 DIAGNOSIS — E11.42 DIABETIC POLYNEUROPATHY ASSOCIATED WITH TYPE 2 DIABETES MELLITUS (HCC): ICD-10-CM

## 2024-08-13 RX ORDER — GABAPENTIN 300 MG/1
300 CAPSULE ORAL 2 TIMES DAILY
Qty: 60 CAPSULE | Refills: 0 | Status: SHIPPED | OUTPATIENT
Start: 2024-08-13 | End: 2025-02-09

## 2024-08-17 DIAGNOSIS — F41.9 ANXIETY: ICD-10-CM

## 2024-08-20 RX ORDER — CLONAZEPAM 0.5 MG/1
0.5 TABLET ORAL 2 TIMES DAILY
Qty: 60 TABLET | Refills: 0 | Status: SHIPPED | OUTPATIENT
Start: 2024-08-20 | End: 2024-09-19

## 2024-09-09 DIAGNOSIS — I10 ESSENTIAL HYPERTENSION: ICD-10-CM

## 2024-09-10 RX ORDER — LISINOPRIL 40 MG/1
40 TABLET ORAL 2 TIMES DAILY
Qty: 60 TABLET | Refills: 0 | Status: SHIPPED | OUTPATIENT
Start: 2024-09-10 | End: 2025-03-09

## 2024-09-20 DIAGNOSIS — N52.8 OTHER MALE ERECTILE DYSFUNCTION: ICD-10-CM

## 2024-09-20 DIAGNOSIS — F41.9 ANXIETY: ICD-10-CM

## 2024-09-21 RX ORDER — SILDENAFIL CITRATE 20 MG/1
20 TABLET ORAL DAILY PRN
Qty: 30 TABLET | Refills: 0 | Status: SHIPPED | OUTPATIENT
Start: 2024-09-21 | End: 2024-10-21

## 2024-09-23 RX ORDER — CLONAZEPAM 0.5 MG/1
0.5 TABLET ORAL 2 TIMES DAILY
Qty: 60 TABLET | Refills: 0 | Status: SHIPPED | OUTPATIENT
Start: 2024-09-23 | End: 2024-10-23

## 2024-10-07 DIAGNOSIS — G47.61 PLMD (PERIODIC LIMB MOVEMENT DISORDER): ICD-10-CM

## 2024-10-07 DIAGNOSIS — M54.50 CHRONIC BILATERAL LOW BACK PAIN WITHOUT SCIATICA: ICD-10-CM

## 2024-10-07 DIAGNOSIS — G89.29 CHRONIC BILATERAL LOW BACK PAIN WITHOUT SCIATICA: ICD-10-CM

## 2024-10-07 DIAGNOSIS — J44.1 COPD EXACERBATION (HCC): ICD-10-CM

## 2024-10-08 DIAGNOSIS — F41.9 ANXIETY: ICD-10-CM

## 2024-10-08 DIAGNOSIS — I10 ESSENTIAL HYPERTENSION: ICD-10-CM

## 2024-10-08 DIAGNOSIS — E78.2 MIXED HYPERLIPIDEMIA: ICD-10-CM

## 2024-10-08 DIAGNOSIS — E11.9 TYPE 2 DIABETES MELLITUS WITHOUT COMPLICATION, WITHOUT LONG-TERM CURRENT USE OF INSULIN (HCC): ICD-10-CM

## 2024-10-08 RX ORDER — ROPINIROLE 0.5 MG/1
0.5 TABLET, FILM COATED ORAL
Qty: 30 TABLET | Refills: 0 | Status: SHIPPED | OUTPATIENT
Start: 2024-10-08 | End: 2025-04-06

## 2024-10-08 RX ORDER — CELECOXIB 200 MG/1
200 CAPSULE ORAL DAILY PRN
Qty: 30 CAPSULE | Refills: 0 | Status: SHIPPED | OUTPATIENT
Start: 2024-10-08 | End: 2025-04-06

## 2024-10-08 RX ORDER — TIOTROPIUM BROMIDE 18 UG/1
18 CAPSULE ORAL; RESPIRATORY (INHALATION) DAILY
Qty: 30 CAPSULE | Refills: 0 | Status: SHIPPED | OUTPATIENT
Start: 2024-10-08

## 2024-10-09 DIAGNOSIS — M54.50 CHRONIC BILATERAL LOW BACK PAIN WITHOUT SCIATICA: ICD-10-CM

## 2024-10-09 DIAGNOSIS — G47.61 PLMD (PERIODIC LIMB MOVEMENT DISORDER): ICD-10-CM

## 2024-10-09 DIAGNOSIS — G89.29 CHRONIC BILATERAL LOW BACK PAIN WITHOUT SCIATICA: ICD-10-CM

## 2024-10-09 RX ORDER — ROPINIROLE 0.5 MG/1
0.5 TABLET, FILM COATED ORAL
Qty: 30 TABLET | Refills: 0 | OUTPATIENT
Start: 2024-10-09 | End: 2025-04-07

## 2024-10-09 RX ORDER — HYDROXYZINE PAMOATE 25 MG/1
25 CAPSULE ORAL 3 TIMES DAILY PRN
Qty: 60 CAPSULE | Refills: 0 | Status: SHIPPED | OUTPATIENT
Start: 2024-10-09 | End: 2025-04-07

## 2024-10-09 RX ORDER — ATORVASTATIN CALCIUM 20 MG/1
20 TABLET, FILM COATED ORAL
Qty: 90 TABLET | Refills: 1 | Status: SHIPPED | OUTPATIENT
Start: 2024-10-09

## 2024-10-09 RX ORDER — CELECOXIB 200 MG/1
200 CAPSULE ORAL DAILY PRN
Qty: 30 CAPSULE | Refills: 0 | OUTPATIENT
Start: 2024-10-09 | End: 2025-04-07

## 2024-10-09 RX ORDER — AMLODIPINE BESYLATE 10 MG/1
10 TABLET ORAL DAILY
Qty: 90 TABLET | Refills: 1 | Status: SHIPPED | OUTPATIENT
Start: 2024-10-09

## 2024-10-09 RX ORDER — LISINOPRIL 40 MG/1
40 TABLET ORAL 2 TIMES DAILY
Qty: 180 TABLET | Refills: 0 | OUTPATIENT
Start: 2024-10-09 | End: 2025-01-07

## 2024-10-09 RX ORDER — GLIMEPIRIDE 2 MG/1
3 TABLET ORAL
Qty: 135 TABLET | Refills: 1 | Status: SHIPPED | OUTPATIENT
Start: 2024-10-09 | End: 2025-04-07

## 2024-10-09 RX ORDER — LISINOPRIL 40 MG/1
40 TABLET ORAL 2 TIMES DAILY
Qty: 180 TABLET | Refills: 1 | Status: SHIPPED | OUTPATIENT
Start: 2024-10-09 | End: 2025-04-07

## 2024-10-09 RX ORDER — CARVEDILOL 3.12 MG/1
TABLET ORAL
Qty: 180 TABLET | Refills: 1 | Status: SHIPPED | OUTPATIENT
Start: 2024-10-09

## 2024-10-09 NOTE — TELEPHONE ENCOUNTER
E-Prescribing Status: Receipt confirmed by pharmacy (10/8/2024  1:29 PM EDT)     Scripts for 30 days were sent in yesterday. Pt needs updated bloodwork.

## 2024-10-15 DIAGNOSIS — E11.42 DIABETIC POLYNEUROPATHY ASSOCIATED WITH TYPE 2 DIABETES MELLITUS (HCC): ICD-10-CM

## 2024-10-16 RX ORDER — GABAPENTIN 300 MG/1
300 CAPSULE ORAL 2 TIMES DAILY
Qty: 180 CAPSULE | Refills: 1 | Status: SHIPPED | OUTPATIENT
Start: 2024-10-16 | End: 2025-04-14

## 2024-10-20 DIAGNOSIS — F41.9 ANXIETY: ICD-10-CM

## 2024-10-20 DIAGNOSIS — I10 ESSENTIAL HYPERTENSION: ICD-10-CM

## 2024-10-21 ENCOUNTER — VBI (OUTPATIENT)
Dept: ADMINISTRATIVE | Facility: OTHER | Age: 69
End: 2024-10-21

## 2024-10-21 NOTE — TELEPHONE ENCOUNTER
10/21/24 9:49 AM     Chart reviewed for CRC: Colonoscopy was/were not submitted to the patient's insurance.     Denita Lovelace MA   [facility]

## 2024-10-22 RX ORDER — CLONAZEPAM 0.5 MG/1
0.5 TABLET ORAL 2 TIMES DAILY
Qty: 60 TABLET | Refills: 0 | Status: SHIPPED | OUTPATIENT
Start: 2024-10-22 | End: 2024-10-24 | Stop reason: SDUPTHER

## 2024-10-22 RX ORDER — AMLODIPINE BESYLATE 10 MG/1
10 TABLET ORAL DAILY
Qty: 90 TABLET | Refills: 1 | OUTPATIENT
Start: 2024-10-22

## 2024-10-24 DIAGNOSIS — F41.9 ANXIETY: ICD-10-CM

## 2024-10-24 RX ORDER — CLONAZEPAM 0.5 MG/1
0.5 TABLET ORAL 2 TIMES DAILY
Qty: 60 TABLET | Refills: 0 | Status: SHIPPED | OUTPATIENT
Start: 2024-10-24 | End: 2024-11-23

## 2024-10-26 DIAGNOSIS — F41.9 ANXIETY: ICD-10-CM

## 2024-10-27 RX ORDER — HYDROXYZINE PAMOATE 25 MG/1
25 CAPSULE ORAL 3 TIMES DAILY PRN
Qty: 60 CAPSULE | Refills: 5 | Status: SHIPPED | OUTPATIENT
Start: 2024-10-27 | End: 2025-04-25

## 2024-11-04 DIAGNOSIS — G89.29 CHRONIC BILATERAL LOW BACK PAIN WITHOUT SCIATICA: ICD-10-CM

## 2024-11-04 DIAGNOSIS — G47.61 PLMD (PERIODIC LIMB MOVEMENT DISORDER): ICD-10-CM

## 2024-11-04 DIAGNOSIS — E78.2 MIXED HYPERLIPIDEMIA: ICD-10-CM

## 2024-11-04 DIAGNOSIS — J44.1 COPD EXACERBATION (HCC): ICD-10-CM

## 2024-11-04 DIAGNOSIS — M54.50 CHRONIC BILATERAL LOW BACK PAIN WITHOUT SCIATICA: ICD-10-CM

## 2024-11-05 RX ORDER — TIOTROPIUM BROMIDE 18 UG/1
18 CAPSULE ORAL; RESPIRATORY (INHALATION) DAILY
Qty: 30 CAPSULE | Refills: 5 | Status: SHIPPED | OUTPATIENT
Start: 2024-11-05

## 2024-11-05 RX ORDER — ROPINIROLE 0.5 MG/1
0.5 TABLET, FILM COATED ORAL
Qty: 30 TABLET | Refills: 5 | Status: SHIPPED | OUTPATIENT
Start: 2024-11-05 | End: 2025-05-04

## 2024-11-05 RX ORDER — CELECOXIB 200 MG/1
200 CAPSULE ORAL DAILY PRN
Qty: 30 CAPSULE | Refills: 0 | Status: SHIPPED | OUTPATIENT
Start: 2024-11-05 | End: 2025-05-04

## 2024-11-05 RX ORDER — ATORVASTATIN CALCIUM 20 MG/1
20 TABLET, FILM COATED ORAL
Qty: 90 TABLET | Refills: 1 | Status: SHIPPED | OUTPATIENT
Start: 2024-11-05

## 2024-11-14 DIAGNOSIS — F41.9 ANXIETY: ICD-10-CM

## 2024-11-14 RX ORDER — CLONAZEPAM 0.5 MG/1
0.5 TABLET ORAL 2 TIMES DAILY
Qty: 60 TABLET | Refills: 0 | Status: SHIPPED | OUTPATIENT
Start: 2024-11-14 | End: 2024-12-14

## 2024-11-16 DIAGNOSIS — N52.8 OTHER MALE ERECTILE DYSFUNCTION: ICD-10-CM

## 2024-11-18 RX ORDER — SILDENAFIL CITRATE 20 MG/1
20 TABLET ORAL DAILY PRN
Qty: 30 TABLET | Refills: 2 | Status: SHIPPED | OUTPATIENT
Start: 2024-11-18 | End: 2024-12-18

## 2024-12-02 DIAGNOSIS — G89.29 CHRONIC BILATERAL LOW BACK PAIN WITHOUT SCIATICA: ICD-10-CM

## 2024-12-02 DIAGNOSIS — M54.50 CHRONIC BILATERAL LOW BACK PAIN WITHOUT SCIATICA: ICD-10-CM

## 2024-12-04 RX ORDER — CELECOXIB 200 MG/1
200 CAPSULE ORAL DAILY PRN
Qty: 30 CAPSULE | Refills: 0 | Status: SHIPPED | OUTPATIENT
Start: 2024-12-04 | End: 2025-06-02

## 2024-12-06 ENCOUNTER — VBI (OUTPATIENT)
Dept: ADMINISTRATIVE | Facility: OTHER | Age: 69
End: 2024-12-06

## 2024-12-06 NOTE — TELEPHONE ENCOUNTER
12/06/24 11:07 AM     Chart reviewed for CRC: Colonoscopy was/were not submitted to the patient's insurance.     Denita Lovelace MA   [facility]

## 2024-12-19 ENCOUNTER — TELEPHONE (OUTPATIENT)
Age: 69
End: 2024-12-19

## 2024-12-19 NOTE — TELEPHONE ENCOUNTER
Patient is asking for his medication list to be emailed to [email or web address]. Patient will be starting a program and they need his current medication list

## 2024-12-31 DIAGNOSIS — F41.9 ANXIETY: ICD-10-CM

## 2024-12-31 DIAGNOSIS — G89.29 CHRONIC BILATERAL LOW BACK PAIN WITHOUT SCIATICA: ICD-10-CM

## 2024-12-31 DIAGNOSIS — E11.42 DIABETIC POLYNEUROPATHY ASSOCIATED WITH TYPE 2 DIABETES MELLITUS (HCC): ICD-10-CM

## 2024-12-31 DIAGNOSIS — M54.50 CHRONIC BILATERAL LOW BACK PAIN WITHOUT SCIATICA: ICD-10-CM

## 2025-01-02 ENCOUNTER — RA CDI HCC (OUTPATIENT)
Dept: OTHER | Facility: HOSPITAL | Age: 70
End: 2025-01-02

## 2025-01-02 RX ORDER — CELECOXIB 200 MG/1
200 CAPSULE ORAL DAILY PRN
Qty: 30 CAPSULE | Refills: 0 | Status: SHIPPED | OUTPATIENT
Start: 2025-01-02 | End: 2025-07-01

## 2025-01-02 RX ORDER — CLONAZEPAM 0.5 MG/1
0.5 TABLET ORAL 2 TIMES DAILY
Qty: 60 TABLET | Refills: 0 | Status: SHIPPED | OUTPATIENT
Start: 2025-01-02 | End: 2025-02-01

## 2025-01-02 RX ORDER — GABAPENTIN 300 MG/1
300 CAPSULE ORAL 2 TIMES DAILY
Qty: 60 CAPSULE | Refills: 0 | Status: SHIPPED | OUTPATIENT
Start: 2025-01-02 | End: 2025-07-01

## 2025-01-03 NOTE — PROGRESS NOTES
HCC coding opportunities          Chart Reviewed number of   suggestions sent to Provider: 1  I11.0     Patients Insurance     Medicare Insurance: [employer] Advantage

## 2025-01-05 DIAGNOSIS — F33.1 MODERATE EPISODE OF RECURRENT MAJOR DEPRESSIVE DISORDER (HCC): ICD-10-CM

## 2025-01-06 RX ORDER — ESCITALOPRAM OXALATE 20 MG/1
20 TABLET ORAL DAILY
Qty: 90 TABLET | Refills: 0 | Status: SHIPPED | OUTPATIENT
Start: 2025-01-06

## 2025-01-07 ENCOUNTER — OFFICE VISIT (OUTPATIENT)
Dept: INTERNAL MEDICINE CLINIC | Facility: CLINIC | Age: 70
End: 2025-01-07
Payer: COMMERCIAL

## 2025-01-07 VITALS
BODY MASS INDEX: 26.86 KG/M2 | HEIGHT: 68 IN | TEMPERATURE: 97.4 F | WEIGHT: 177.2 LBS | SYSTOLIC BLOOD PRESSURE: 142 MMHG | DIASTOLIC BLOOD PRESSURE: 78 MMHG | HEART RATE: 106 BPM | OXYGEN SATURATION: 97 %

## 2025-01-07 DIAGNOSIS — I50.32 CHRONIC DIASTOLIC (CONGESTIVE) HEART FAILURE (HCC): ICD-10-CM

## 2025-01-07 DIAGNOSIS — E11.42 TYPE 2 DIABETES MELLITUS WITH DIABETIC POLYNEUROPATHY, WITHOUT LONG-TERM CURRENT USE OF INSULIN (HCC): ICD-10-CM

## 2025-01-07 DIAGNOSIS — Z12.5 SCREENING PSA (PROSTATE SPECIFIC ANTIGEN): ICD-10-CM

## 2025-01-07 DIAGNOSIS — E11.9 TYPE 2 DIABETES MELLITUS WITHOUT COMPLICATION, WITHOUT LONG-TERM CURRENT USE OF INSULIN (HCC): ICD-10-CM

## 2025-01-07 DIAGNOSIS — Z12.11 ENCOUNTER FOR COLORECTAL CANCER SCREENING USING COLOGUARD TEST: ICD-10-CM

## 2025-01-07 DIAGNOSIS — Z23 ENCOUNTER FOR IMMUNIZATION: Primary | ICD-10-CM

## 2025-01-07 DIAGNOSIS — Z00.00 MEDICARE ANNUAL WELLNESS VISIT, SUBSEQUENT: ICD-10-CM

## 2025-01-07 DIAGNOSIS — I10 ESSENTIAL HYPERTENSION: ICD-10-CM

## 2025-01-07 DIAGNOSIS — F17.211 NICOTINE DEPENDENCE, CIGARETTES, IN REMISSION: ICD-10-CM

## 2025-01-07 DIAGNOSIS — F33.1 MODERATE EPISODE OF RECURRENT MAJOR DEPRESSIVE DISORDER (HCC): ICD-10-CM

## 2025-01-07 DIAGNOSIS — J44.1 COPD EXACERBATION (HCC): ICD-10-CM

## 2025-01-07 DIAGNOSIS — Z12.12 ENCOUNTER FOR COLORECTAL CANCER SCREENING USING COLOGUARD TEST: ICD-10-CM

## 2025-01-07 LAB — SL AMB POCT HEMOGLOBIN AIC: 10.2 (ref ?–6.5)

## 2025-01-07 PROCEDURE — 83036 HEMOGLOBIN GLYCOSYLATED A1C: CPT | Performed by: INTERNAL MEDICINE

## 2025-01-07 PROCEDURE — G0439 PPPS, SUBSEQ VISIT: HCPCS | Performed by: INTERNAL MEDICINE

## 2025-01-07 PROCEDURE — G0008 ADMIN INFLUENZA VIRUS VAC: HCPCS

## 2025-01-07 PROCEDURE — 99214 OFFICE O/P EST MOD 30 MIN: CPT | Performed by: INTERNAL MEDICINE

## 2025-01-07 PROCEDURE — 90662 IIV NO PRSV INCREASED AG IM: CPT

## 2025-01-07 RX ORDER — CARVEDILOL 12.5 MG/1
12.5 TABLET ORAL 2 TIMES DAILY WITH MEALS
Qty: 200 TABLET | Refills: 2 | Status: SHIPPED | OUTPATIENT
Start: 2025-01-07 | End: 2025-11-03

## 2025-01-07 RX ORDER — GLIMEPIRIDE 4 MG/1
8 TABLET ORAL
Qty: 200 TABLET | Refills: 2 | Status: SHIPPED | OUTPATIENT
Start: 2025-01-07 | End: 2025-11-03

## 2025-01-07 NOTE — PROGRESS NOTES
Name: Tanner Patel      : 1955      MRN: [id number]  Encounter Provider: Lisandor Berman DO  Encounter Date: 2025   Encounter department: [facility]    Assessment & Plan  Medicare annual wellness visit, subsequent  Completed       Moderate episode of recurrent major depressive disorder (HCC)  Doing well on the Duloxetine and no change at this time       Type 2 diabetes mellitus with diabetic polyneuropathy, without long-term current use of insulin (HCC)  Glimepiride increased to 8 mg Qday  Continue the metfromin 1000 mg BID  Lab Results   Component Value Date    HGBA1C 10.2 (A) 2025     Orders:  •  Albumin / creatinine urine ratio; Future  •  Basic metabolic panel; Future  •  POCT hemoglobin A1c  •  Lipid Panel with Direct LDL reflex; Future    COPD exacerbation (HCC)  Continue the Spiriva the patient is doing well on the current medications       Essential hypertension  Lisinopril 40 mg Qday with Amlodipine 10 mg Qday  Increase the Coreg to 12.5 mg BID  Follow up on 4 months  Orders:  •  carvedilol (COREG) 12.5 mg tablet; Take 1 tablet (12.5 mg total) by mouth 2 (two) times a day with meals TAKE 1 TABLET BY MOUTH BID  •  Lipid Panel with Direct LDL reflex; Future    Chronic diastolic (congestive) heart failure (HCC)  Wt Readings from Last 3 Encounters:   25 80.4 kg (177 lb 3.2 oz)   24 84.9 kg (187 lb 3.2 oz)   24 79.3 kg (174 lb 12.8 oz)   On Ace   Stable and we will increase the Coreg to 12.5 mg BID                 Nicotine dependence, cigarettes, in remission  Continues to be in remission  Declinled CT Lung Screen       Encounter for immunization  Accepted flu shot  Orders:  •  influenza vaccine, high-dose, PF 0.5 mL (Fluzone High Dose)    Encounter for colorectal cancer screening using Cologuard test  Completed       Type 2 diabetes mellitus without complication, without long-term current use of insulin (HCC)    Lab Results   Component Value Date     HGBA1C 10.2 (A) 01/07/2025     Orders:  •  glimepiride (AMARYL) 4 mg tablet; Take 2 tablets (8 mg total) by mouth daily with breakfast    Screening PSA (prostate specific antigen)  PSA ordered  Orders:  •  PSA, Total Screen; Future       Preventive health issues were discussed with patient, and age appropriate screening tests were ordered as noted in patient's After Visit Summary. Personalized health advice and appropriate referrals for health education or preventive services given if needed, as noted in patient's After Visit Summary.    History of Present Illness     The patient declined follow up with .  There has been some issues with maintaining housing in the past.  However, he is seeing [facility]  and they are helping.       Patient Care Team:  Lisandro Berman, DO as PCP - General (Internal Medicine)  Lisandro Berman, DO as PCP - PCP-[facility] ([facility])  Lisandro Berman, DO as PCP - PCP-[facility] ([facility])  Baldev Chang MD    Review of Systems   Constitutional:  Negative for chills and fever.   HENT:  Negative for ear pain and sore throat.    Eyes:  Negative for pain and visual disturbance.   Respiratory:  Negative for cough and shortness of breath.    Cardiovascular:  Negative for chest pain and palpitations.   Gastrointestinal:  Negative for abdominal pain and vomiting.   Genitourinary:  Negative for dysuria and hematuria.   Musculoskeletal:  Negative for arthralgias and back pain.   Skin:  Negative for color change and rash.   Neurological:  Negative for seizures and syncope.   All other systems reviewed and are negative.    Medical History Reviewed by provider this encounter:       Annual Wellness Visit Questionnaire   Tanner is here for his Subsequent Wellness visit.     Health Risk Assessment:   Patient rates overall health as good. Patient feels that their physical health rating is same. Patient is satisfied with their life. Eyesight was rated as same. Hearing was  rated as same. Patient feels that their emotional and mental health rating is same. Patients states they are never, rarely angry. Patient states they are never, rarely unusually tired/fatigued. Pain experienced in the last 7 days has been a lot. Patient's pain rating has been 6/10. Patient states that he has experienced no weight loss or gain in last 6 months.     Fall Risk Screening:   In the past year, patient has experienced: history of falling in past year    Number of falls: 2 or more  Injured during fall?: Yes    Feels unsteady when standing or walking?: No    Worried about falling?: Yes      Home Safety:  Patient does not have trouble with stairs inside or outside of their home. Patient has working smoke alarms and has working carbon monoxide detector. Home safety hazards include: none.     Nutrition:   Current diet is Low Cholesterol and Diabetic.     Medications:   Patient is currently taking over-the-counter supplements. OTC medications include: see medication list. Patient is able to manage medications.     Activities of Daily Living (ADLs)/Instrumental Activities of Daily Living (IADLs):   Walk and transfer into and out of bed and chair?: Yes  Dress and groom yourself?: Yes    Bathe or shower yourself?: Yes    Feed yourself? Yes  Do your laundry/housekeeping?: Yes  Manage your money, pay your bills and track your expenses?: Yes  Make your own meals?: Yes    Do your own shopping?: Yes    Previous Hospitalizations:   Any hospitalizations or ED visits within the last 12 months?: No      Advance Care Planning:   Living will: Yes    Durable POA for healthcare: Yes    Advanced directive: Yes    Advanced directive counseling given: No    Five wishes given: No    Patient declined ACP directive: No    End of Life Decisions reviewed with patient: Yes    Provider agrees with end of life decisions: Yes      Cognitive Screening:   Provider or family/friend/caregiver concerned regarding cognition?: No    PREVENTIVE  SCREENINGS      Cardiovascular Screening:    General: Screening Not Indicated and History Lipid Disorder    Due for: Lipid Panel      Diabetes Screening:     General: Screening Not Indicated and History Diabetes    Due for: Blood Glucose      Colorectal Cancer Screening:       Due for: Cologuard      Prostate Cancer Screening:      Due for: PSA      Osteoporosis Screening:    General: Screening Not Indicated      Abdominal Aortic Aneurysm (AAA) Screening:    Risk factors include: age between 65-76 yo and tobacco use        General: Patient Declines      Lung Cancer Screening:     General: Patient Declines      Hepatitis C Screening:    General: Screening Current    Screening, Brief Intervention, and Referral to Treatment (SBIRT)    Screening  Typical number of drinks in a day: 0  Typical number of drinks in a week: 0  Interpretation: Low risk drinking behavior.    Single Item Drug Screening:  How often have you used an illegal drug (including marijuana) or a prescription medication for non-medical reasons in the past year? never    Single Item Drug Screen Score: 0  Interpretation: Negative screen for possible drug use disorder    SDOH Risk Assessment  Social determinants of health (SDOH) risk assesment tool was completed. The tool at a minimum covered housing stability, food insecurity, transportation needs, and utility difficulty. Patient had at risk responses for the following SDOH domains: housing stability.     Social Drivers of Health     Financial Resource Strain: Low Risk  (1/5/2024)    Overall Financial Resource Strain (CARDIA)    • Difficulty of Paying Living Expenses: Not very hard   Food Insecurity: No Food Insecurity (1/7/2025)    Hunger Vital Sign    • Worried About Running Out of Food in the Last Year: Never true    • Ran Out of Food in the Last Year: Never true   Transportation Needs: No Transportation Needs (1/7/2025)    PRAPARE - Transportation    • Lack of Transportation (Medical): No    • Lack of  "Transportation (Non-Medical): No   Housing Stability: High Risk (1/7/2025)    Housing Stability Vital Sign    • Unable to Pay for Housing in the Last Year: Yes    • Number of Times Moved in the Last Year: 0    • Homeless in the Last Year: No   Utilities: Not At Risk (1/7/2025)    [facility] Utilities    • Threatened with loss of utilities: No     No results found.    Objective   /78   Pulse (!) 106   Temp (!) 97.4 °F (36.3 °C) (Tympanic)   Ht 5' 8\" (1.727 m)   Wt 80.4 kg (177 lb 3.2 oz)   SpO2 97%   BMI 26.94 kg/m²     Physical Exam  Vitals and nursing note reviewed.   Constitutional:       General: He is not in acute distress.     Appearance: He is well-developed.   HENT:      Head: Normocephalic and atraumatic.   Eyes:      Conjunctiva/sclera: Conjunctivae normal.   Cardiovascular:      Rate and Rhythm: Normal rate and regular rhythm.      Heart sounds: No murmur heard.  Pulmonary:      Effort: Pulmonary effort is normal. No respiratory distress.      Breath sounds: Normal breath sounds.   Abdominal:      Palpations: Abdomen is soft.      Tenderness: There is no abdominal tenderness.   Musculoskeletal:         General: No swelling.      Cervical back: Neck supple.   Skin:     General: Skin is warm and dry.      Capillary Refill: Capillary refill takes less than 2 seconds.   Neurological:      Mental Status: He is alert.   Psychiatric:         Mood and Affect: Mood normal.         "

## 2025-01-07 NOTE — ASSESSMENT & PLAN NOTE
Glimepiride increased to 8 mg Qday  Continue the metfromin 1000 mg BID  Lab Results   Component Value Date    HGBA1C 10.2 (A) 01/07/2025     Orders:  •  Albumin / creatinine urine ratio; Future  •  Basic metabolic panel; Future  •  POCT hemoglobin A1c  •  Lipid Panel with Direct LDL reflex; Future

## 2025-01-07 NOTE — PATIENT INSTRUCTIONS
Medicare Preventive Visit Patient Instructions  Thank you for completing your Welcome to Medicare Visit or Medicare Annual Wellness Visit today. Your next wellness visit will be due in one year (1/8/2026).  The screening/preventive services that you may require over the next 5-10 years are detailed below. Some tests may not apply to you based off risk factors and/or age. Screening tests ordered at today's visit but not completed yet may show as past due. Also, please note that scanned in results may not display below.  Preventive Screenings:  Service Recommendations Previous Testing/Comments   Colorectal Cancer Screening  Colonoscopy    Fecal Occult Blood Test (FOBT)/Fecal Immunochemical Test (FIT)  Fecal DNA/Cologuard Test  Flexible Sigmoidoscopy Age: 45-75 years old   Colonoscopy: every 10 years (May be performed more frequently if at higher risk)  OR  FOBT/FIT: every 1 year  OR  Cologuard: every 3 years  OR  Sigmoidoscopy: every 5 years  Screening may be recommended earlier than age 45 if at higher risk for colorectal cancer. Also, an individualized decision between you and your healthcare provider will decide whether screening between the ages of 76-85 would be appropriate. Colonoscopy: Not on file  FOBT/FIT: Not on file  Cologuard: Not on file  Sigmoidoscopy: Not on file    Due for Cologuard     Prostate Cancer Screening Individualized decision between patient and health care provider in men between ages of 55-69   Medicare will cover every 12 months beginning on the day after your 50th birthday PSA: 6.2 ng/mL     Due for PSA     Hepatitis C Screening Once for adults born between 1945 and 1965  More frequently in patients at high risk for Hepatitis C Hep C Antibody: 06/11/2020    Screening Current   Diabetes Screening 1-2 times per year if you're at risk for diabetes or have pre-diabetes Fasting glucose: 190 mg/dL (3/3/2023)  A1C: 7.0 (9/2/2022)  Screening Not Indicated  History Diabetes  Due for Blood Glucose    Cholesterol Screening Once every 5 years if you don't have a lipid disorder. May order more often based on risk factors. Lipid panel: 03/03/2023  Screening Not Indicated  History Lipid Disorder  Due for Lipid Panel      Other Preventive Screenings Covered by Medicare:  Abdominal Aortic Aneurysm (AAA) Screening: covered once if your at risk. You're considered to be at risk if you have a family history of AAA or a male between the age of 65-75 who smoking at least 100 cigarettes in your lifetime.  Lung Cancer Screening: covers low dose CT scan once per year if you meet all of the following conditions: (1) Age 55-77; (2) No signs or symptoms of lung cancer; (3) Current smoker or have quit smoking within the last 15 years; (4) You have a tobacco smoking history of at least 20 pack years (packs per day x number of years you smoked); (5) You get a written order from a healthcare provider.  Glaucoma Screening: covered annually if you're considered high risk: (1) You have diabetes OR (2) Family history of glaucoma OR (3)  aged 50 and older OR (4)  American aged 65 and older  Osteoporosis Screening: covered every 2 years if you meet one of the following conditions: (1) Have a vertebral abnormality; (2) On glucocorticoid therapy for more than 3 months; (3) Have primary hyperparathyroidism; (4) On osteoporosis medications and need to assess response to drug therapy.  HIV Screening: covered annually if you're between the age of 15-65. Also covered annually if you are younger than 15 and older than 65 with risk factors for HIV infection. For pregnant patients, it is covered up to 3 times per pregnancy.    Immunizations:  Immunization Recommendations   Influenza Vaccine Annual influenza vaccination during flu season is recommended for all persons aged >= 6 months who do not have contraindications   Pneumococcal Vaccine   * Pneumococcal conjugate vaccine = PCV13 (Prevnar 13), PCV15 (Vaxneuvance), PCV20  (Prevnar 20)  * Pneumococcal polysaccharide vaccine = PPSV23 (Pneumovax) Adults 19-63 yo with certain risk factors or if 65+ yo  If never received any pneumonia vaccine: recommend Prevnar 20 (PCV20)  Give PCV20 if previously received 1 dose of PCV13 or PPSV23   Hepatitis B Vaccine 3 dose series if at intermediate or high risk (ex: diabetes, end stage renal disease, liver disease)   Respiratory syncytial virus (RSV) Vaccine - COVERED BY MEDICARE PART D  * RSVPreF3 (Arexvy) CDC recommends that adults 60 years of age and older may receive a single dose of RSV vaccine using shared clinical decision-making (SCDM)   Tetanus (Td) Vaccine - COST NOT COVERED BY MEDICARE PART B Following completion of primary series, a booster dose should be given every 10 years to maintain immunity against tetanus. Td may also be given as tetanus wound prophylaxis.   Tdap Vaccine - COST NOT COVERED BY MEDICARE PART B Recommended at least once for all adults. For pregnant patients, recommended with each pregnancy.   Shingles Vaccine (Shingrix) - COST NOT COVERED BY MEDICARE PART B  2 shot series recommended in those 19 years and older who have or will have weakened immune systems or those 50 years and older     Health Maintenance Due:      Topic Date Due   • Lung Cancer Screening  Never done   • Colorectal Cancer Screening  04/14/2023   • Hepatitis C Screening  Completed     Immunizations Due:      Topic Date Due   • Hepatitis A Vaccine (1 of 2 - Risk 2-dose series) Never done   • Influenza Vaccine (1) 09/01/2024   • COVID-19 Vaccine (5 - 2024-25 season) 09/01/2024     Advance Directives   What are advance directives?  Advance directives are legal documents that state your wishes and plans for medical care. These plans are made ahead of time in case you lose your ability to make decisions for yourself. Advance directives can apply to any medical decision, such as the treatments you want, and if you want to donate organs.   What are the types  of advance directives?  There are many types of advance directives, and each state has rules about how to use them. You may choose a combination of any of the following:  Living will:  This is a written record of the treatment you want. You can also choose which treatments you do not want, which to limit, and which to stop at a certain time. This includes surgery, medicine, IV fluid, and tube feedings.   Durable power of  for healthcare (DPAHC):  This is a written record that states who you want to make healthcare choices for you when you are unable to make them for yourself. This person, called a proxy, is usually a family member or a friend. You may choose more than 1 proxy.  Do not resuscitate (DNR) order:  A DNR order is used in case your heart stops beating or you stop breathing. It is a request not to have certain forms of treatment, such as CPR. A DNR order may be included in other types of advance directives.  Medical directive:  This covers the care that you want if you are in a coma, near death, or unable to make decisions for yourself. You can list the treatments you want for each condition. Treatment may include pain medicine, surgery, blood transfusions, dialysis, IV or tube feedings, and a ventilator (breathing machine).  Values history:  This document has questions about your views, beliefs, and how you feel and think about life. This information can help others choose the care that you would choose.  Why are advance directives important?  An advance directive helps you control your care. Although spoken wishes may be used, it is better to have your wishes written down. Spoken wishes can be misunderstood, or not followed. Treatments may be given even if you do not want them. An advance directive may make it easier for your family to make difficult choices about your care.   Fall Prevention    Fall prevention  includes ways to make your home and other areas safer. It also includes ways you can  move more carefully to prevent a fall. Health conditions that cause changes in your blood pressure, vision, or muscle strength and coordination may increase your risk for falls. Medicines may also increase your risk for falls if they make you dizzy, weak, or sleepy.   Fall prevention tips:   Stand or sit up slowly.    Use assistive devices as directed.    Wear shoes that fit well and have soles that .    Wear a personal alarm.    Stay active.    Manage your medical conditions.    Home Safety Tips:  Add items to prevent falls in the bathroom.    Keep paths clear.    Install bright lights in your home.    Keep items you use often on shelves within reach.    Paint or place reflective tape on the edges of your stairs.    Weight Management   Why it is important to manage your weight:  Being overweight increases your risk of health conditions such as heart disease, high blood pressure, type 2 diabetes, and certain types of cancer. It can also increase your risk for osteoarthritis, sleep apnea, and other respiratory problems. Aim for a slow, steady weight loss. Even a small amount of weight loss can lower your risk of health problems.  How to lose weight safely:  A safe and healthy way to lose weight is to eat fewer calories and get regular exercise. You can lose up about 1 pound a week by decreasing the number of calories you eat by 500 calories each day.   Healthy meal plan for weight management:  A healthy meal plan includes a variety of foods, contains fewer calories, and helps you stay healthy. A healthy meal plan includes the following:  Eat whole-grain foods more often.  A healthy meal plan should contain fiber. Fiber is the part of grains, fruits, and vegetables that is not broken down by your body. Whole-grain foods are healthy and provide extra fiber in your diet. Some examples of whole-grain foods are whole-wheat breads and pastas, oatmeal, brown rice, and bulgur.  Eat a variety of vegetables every day.   Include dark, leafy greens such as spinach, kale, catalina greens, and mustard greens. Eat yellow and orange vegetables such as carrots, sweet potatoes, and winter squash.   Eat a variety of fruits every day.  Choose fresh or canned fruit (canned in its own juice or light syrup) instead of juice. Fruit juice has very little or no fiber.  Eat low-fat dairy foods.  Drink fat-free (skim) milk or 1% milk. Eat fat-free yogurt and low-fat cottage cheese. Try low-fat cheeses such as mozzarella and other reduced-fat cheeses.  Choose meat and other protein foods that are low in fat.  Choose beans or other legumes such as split peas or lentils. Choose fish, skinless poultry (chicken or turkey), or lean cuts of red meat (beef or pork). Before you cook meat or poultry, cut off any visible fat.   Use less fat and oil.  Try baking foods instead of frying them. Add less fat, such as margarine, sour cream, regular salad dressing and mayonnaise to foods. Eat fewer high-fat foods. Some examples of high-fat foods include french fries, doughnuts, ice cream, and cakes.  Eat fewer sweets.  Limit foods and drinks that are high in sugar. This includes candy, cookies, regular soda, and sweetened drinks.  Exercise:  Exercise at least 30 minutes per day on most days of the week. Some examples of exercise include walking, biking, dancing, and swimming. You can also fit in more physical activity by taking the stairs instead of the elevator or parking farther away from stores. Ask your healthcare provider about the best exercise plan for you.      © Copyright [employer] 2018 Information is for End User's use only and may not be sold, redistributed or otherwise used for commercial purposes. All illustrations and images included in CareNotes® are the copyrighted property of [employer].D.A[employer]M., Inc. or [employer]    Medicare Preventive Visit Patient Instructions  Thank you for completing your Welcome to Medicare Visit or Medicare Annual Wellness  Visit today. Your next wellness visit will be due in one year (1/8/2026).  The screening/preventive services that you may require over the next 5-10 years are detailed below. Some tests may not apply to you based off risk factors and/or age. Screening tests ordered at today's visit but not completed yet may show as past due. Also, please note that scanned in results may not display below.  Preventive Screenings:  Service Recommendations Previous Testing/Comments   Colorectal Cancer Screening  Colonoscopy    Fecal Occult Blood Test (FOBT)/Fecal Immunochemical Test (FIT)  Fecal DNA/Cologuard Test  Flexible Sigmoidoscopy Age: 45-75 years old   Colonoscopy: every 10 years (May be performed more frequently if at higher risk)  OR  FOBT/FIT: every 1 year  OR  Cologuard: every 3 years  OR  Sigmoidoscopy: every 5 years  Screening may be recommended earlier than age 45 if at higher risk for colorectal cancer. Also, an individualized decision between you and your healthcare provider will decide whether screening between the ages of 76-85 would be appropriate. Colonoscopy: Not on file  FOBT/FIT: Not on file  Cologuard: Not on file  Sigmoidoscopy: Not on file    Due for Cologuard     Prostate Cancer Screening Individualized decision between patient and health care provider in men between ages of 55-69   Medicare will cover every 12 months beginning on the day after your 50th birthday PSA: 6.2 ng/mL     Due for PSA     Hepatitis C Screening Once for adults born between 1945 and 1965  More frequently in patients at high risk for Hepatitis C Hep C Antibody: 06/11/2020    Screening Current   Diabetes Screening 1-2 times per year if you're at risk for diabetes or have pre-diabetes Fasting glucose: 190 mg/dL (3/3/2023)  A1C: 7.0 (9/2/2022)  Screening Not Indicated  History Diabetes  Due for Blood Glucose   Cholesterol Screening Once every 5 years if you don't have a lipid disorder. May order more often based on risk factors. Lipid  panel: 03/03/2023  Screening Not Indicated  History Lipid Disorder  Due for Lipid Panel      Other Preventive Screenings Covered by Medicare:  Abdominal Aortic Aneurysm (AAA) Screening: covered once if your at risk. You're considered to be at risk if you have a family history of AAA or a male between the age of 65-75 who smoking at least 100 cigarettes in your lifetime.  Lung Cancer Screening: covers low dose CT scan once per year if you meet all of the following conditions: (1) Age 55-77; (2) No signs or symptoms of lung cancer; (3) Current smoker or have quit smoking within the last 15 years; (4) You have a tobacco smoking history of at least 20 pack years (packs per day x number of years you smoked); (5) You get a written order from a healthcare provider.  Glaucoma Screening: covered annually if you're considered high risk: (1) You have diabetes OR (2) Family history of glaucoma OR (3)  aged 50 and older OR (4)  American aged 65 and older  Osteoporosis Screening: covered every 2 years if you meet one of the following conditions: (1) Have a vertebral abnormality; (2) On glucocorticoid therapy for more than 3 months; (3) Have primary hyperparathyroidism; (4) On osteoporosis medications and need to assess response to drug therapy.  HIV Screening: covered annually if you're between the age of 15-65. Also covered annually if you are younger than 15 and older than 65 with risk factors for HIV infection. For pregnant patients, it is covered up to 3 times per pregnancy.    Immunizations:  Immunization Recommendations   Influenza Vaccine Annual influenza vaccination during flu season is recommended for all persons aged >= 6 months who do not have contraindications   Pneumococcal Vaccine   * Pneumococcal conjugate vaccine = PCV13 (Prevnar 13), PCV15 (Vaxneuvance), PCV20 (Prevnar 20)  * Pneumococcal polysaccharide vaccine = PPSV23 (Pneumovax) Adults 19-65 yo with certain risk factors or if 65+ yo  If  never received any pneumonia vaccine: recommend Prevnar 20 (PCV20)  Give PCV20 if previously received 1 dose of PCV13 or PPSV23   Hepatitis B Vaccine 3 dose series if at intermediate or high risk (ex: diabetes, end stage renal disease, liver disease)   Respiratory syncytial virus (RSV) Vaccine - COVERED BY MEDICARE PART D  * RSVPreF3 (Arexvy) CDC recommends that adults 60 years of age and older may receive a single dose of RSV vaccine using shared clinical decision-making (SCDM)   Tetanus (Td) Vaccine - COST NOT COVERED BY MEDICARE PART B Following completion of primary series, a booster dose should be given every 10 years to maintain immunity against tetanus. Td may also be given as tetanus wound prophylaxis.   Tdap Vaccine - COST NOT COVERED BY MEDICARE PART B Recommended at least once for all adults. For pregnant patients, recommended with each pregnancy.   Shingles Vaccine (Shingrix) - COST NOT COVERED BY MEDICARE PART B  2 shot series recommended in those 19 years and older who have or will have weakened immune systems or those 50 years and older     Health Maintenance Due:      Topic Date Due   • Lung Cancer Screening  Never done   • Colorectal Cancer Screening  04/14/2023   • Hepatitis C Screening  Completed     Immunizations Due:      Topic Date Due   • Hepatitis A Vaccine (1 of 2 - Risk 2-dose series) Never done   • Influenza Vaccine (1) 09/01/2024   • COVID-19 Vaccine (5 - 2024-25 season) 09/01/2024     Advance Directives   What are advance directives?  Advance directives are legal documents that state your wishes and plans for medical care. These plans are made ahead of time in case you lose your ability to make decisions for yourself. Advance directives can apply to any medical decision, such as the treatments you want, and if you want to donate organs.   What are the types of advance directives?  There are many types of advance directives, and each state has rules about how to use them. You may choose a  combination of any of the following:  Living will:  This is a written record of the treatment you want. You can also choose which treatments you do not want, which to limit, and which to stop at a certain time. This includes surgery, medicine, IV fluid, and tube feedings.   Durable power of  for healthcare (DPAHC):  This is a written record that states who you want to make healthcare choices for you when you are unable to make them for yourself. This person, called a proxy, is usually a family member or a friend. You may choose more than 1 proxy.  Do not resuscitate (DNR) order:  A DNR order is used in case your heart stops beating or you stop breathing. It is a request not to have certain forms of treatment, such as CPR. A DNR order may be included in other types of advance directives.  Medical directive:  This covers the care that you want if you are in a coma, near death, or unable to make decisions for yourself. You can list the treatments you want for each condition. Treatment may include pain medicine, surgery, blood transfusions, dialysis, IV or tube feedings, and a ventilator (breathing machine).  Values history:  This document has questions about your views, beliefs, and how you feel and think about life. This information can help others choose the care that you would choose.  Why are advance directives important?  An advance directive helps you control your care. Although spoken wishes may be used, it is better to have your wishes written down. Spoken wishes can be misunderstood, or not followed. Treatments may be given even if you do not want them. An advance directive may make it easier for your family to make difficult choices about your care.   Fall Prevention    Fall prevention  includes ways to make your home and other areas safer. It also includes ways you can move more carefully to prevent a fall. Health conditions that cause changes in your blood pressure, vision, or muscle strength and  coordination may increase your risk for falls. Medicines may also increase your risk for falls if they make you dizzy, weak, or sleepy.   Fall prevention tips:   Stand or sit up slowly.    Use assistive devices as directed.    Wear shoes that fit well and have soles that .    Wear a personal alarm.    Stay active.    Manage your medical conditions.    Home Safety Tips:  Add items to prevent falls in the bathroom.    Keep paths clear.    Install bright lights in your home.    Keep items you use often on shelves within reach.    Paint or place reflective tape on the edges of your stairs.    Weight Management   Why it is important to manage your weight:  Being overweight increases your risk of health conditions such as heart disease, high blood pressure, type 2 diabetes, and certain types of cancer. It can also increase your risk for osteoarthritis, sleep apnea, and other respiratory problems. Aim for a slow, steady weight loss. Even a small amount of weight loss can lower your risk of health problems.  How to lose weight safely:  A safe and healthy way to lose weight is to eat fewer calories and get regular exercise. You can lose up about 1 pound a week by decreasing the number of calories you eat by 500 calories each day.   Healthy meal plan for weight management:  A healthy meal plan includes a variety of foods, contains fewer calories, and helps you stay healthy. A healthy meal plan includes the following:  Eat whole-grain foods more often.  A healthy meal plan should contain fiber. Fiber is the part of grains, fruits, and vegetables that is not broken down by your body. Whole-grain foods are healthy and provide extra fiber in your diet. Some examples of whole-grain foods are whole-wheat breads and pastas, oatmeal, brown rice, and bulgur.  Eat a variety of vegetables every day.  Include dark, leafy greens such as spinach, kale, catalina greens, and mustard greens. Eat yellow and orange vegetables such as  carrots, sweet potatoes, and winter squash.   Eat a variety of fruits every day.  Choose fresh or canned fruit (canned in its own juice or light syrup) instead of juice. Fruit juice has very little or no fiber.  Eat low-fat dairy foods.  Drink fat-free (skim) milk or 1% milk. Eat fat-free yogurt and low-fat cottage cheese. Try low-fat cheeses such as mozzarella and other reduced-fat cheeses.  Choose meat and other protein foods that are low in fat.  Choose beans or other legumes such as split peas or lentils. Choose fish, skinless poultry (chicken or turkey), or lean cuts of red meat (beef or pork). Before you cook meat or poultry, cut off any visible fat.   Use less fat and oil.  Try baking foods instead of frying them. Add less fat, such as margarine, sour cream, regular salad dressing and mayonnaise to foods. Eat fewer high-fat foods. Some examples of high-fat foods include french fries, doughnuts, ice cream, and cakes.  Eat fewer sweets.  Limit foods and drinks that are high in sugar. This includes candy, cookies, regular soda, and sweetened drinks.  Exercise:  Exercise at least 30 minutes per day on most days of the week. Some examples of exercise include walking, biking, dancing, and swimming. You can also fit in more physical activity by taking the stairs instead of the elevator or parking farther away from stores. Ask your healthcare provider about the best exercise plan for you.      © Copyright [employer] 2018 Information is for End User's use only and may not be sold, redistributed or otherwise used for commercial purposes. All illustrations and images included in CareNotes® are the copyrighted property of [employer]M., Inc. or [employer]

## 2025-01-29 DIAGNOSIS — F41.9 ANXIETY: ICD-10-CM

## 2025-01-29 DIAGNOSIS — G89.29 CHRONIC BILATERAL LOW BACK PAIN WITHOUT SCIATICA: ICD-10-CM

## 2025-01-29 DIAGNOSIS — E11.42 DIABETIC POLYNEUROPATHY ASSOCIATED WITH TYPE 2 DIABETES MELLITUS (HCC): ICD-10-CM

## 2025-01-29 DIAGNOSIS — M54.50 CHRONIC BILATERAL LOW BACK PAIN WITHOUT SCIATICA: ICD-10-CM

## 2025-01-30 RX ORDER — CELECOXIB 200 MG/1
200 CAPSULE ORAL DAILY PRN
Qty: 30 CAPSULE | Refills: 0 | Status: SHIPPED | OUTPATIENT
Start: 2025-01-30 | End: 2025-07-29

## 2025-01-30 RX ORDER — CLONAZEPAM 0.5 MG/1
0.5 TABLET ORAL 2 TIMES DAILY
Qty: 60 TABLET | Refills: 0 | Status: SHIPPED | OUTPATIENT
Start: 2025-01-30 | End: 2025-03-01

## 2025-01-30 RX ORDER — GABAPENTIN 300 MG/1
300 CAPSULE ORAL 2 TIMES DAILY
Qty: 60 CAPSULE | Refills: 0 | Status: SHIPPED | OUTPATIENT
Start: 2025-01-30 | End: 2025-07-29

## 2025-03-25 ENCOUNTER — VBI (OUTPATIENT)
Dept: ADMINISTRATIVE | Facility: OTHER | Age: 70
End: 2025-03-25

## 2025-03-25 NOTE — TELEPHONE ENCOUNTER
03/25/25 10:16 AM     Chart reviewed for CRC: Colonoscopy was/were not submitted to the patient's insurance.     Denita Lovelace MA   [facility]

## 2025-03-30 DIAGNOSIS — E11.42 DIABETIC POLYNEUROPATHY ASSOCIATED WITH TYPE 2 DIABETES MELLITUS (HCC): ICD-10-CM

## 2025-03-31 RX ORDER — GABAPENTIN 300 MG/1
300 CAPSULE ORAL 2 TIMES DAILY
Qty: 60 CAPSULE | Refills: 0 | Status: SHIPPED | OUTPATIENT
Start: 2025-03-31

## 2025-04-02 DIAGNOSIS — F41.9 ANXIETY: ICD-10-CM

## 2025-04-02 RX ORDER — CLONAZEPAM 0.5 MG/1
0.5 TABLET ORAL 2 TIMES DAILY
Qty: 60 TABLET | Refills: 0 | Status: SHIPPED | OUTPATIENT
Start: 2025-04-02 | End: 2025-05-02

## 2025-04-30 ENCOUNTER — RA CDI HCC (OUTPATIENT)
Dept: OTHER | Facility: HOSPITAL | Age: 70
End: 2025-04-30

## 2025-04-30 PROBLEM — I11.0 HYPERTENSIVE HEART DISEASE WITH CONGESTIVE HEART FAILURE (HCC): Status: ACTIVE | Noted: 2025-04-30

## 2025-04-30 NOTE — PROGRESS NOTES
HCC coding opportunities    I11.0, J44.9, E11.65  GR     Chart Reviewed number of suggestions sent to Provider: 3     Patients Insurance     Medicare Insurance: [facility]

## 2025-05-05 ENCOUNTER — VBI (OUTPATIENT)
Dept: ADMINISTRATIVE | Facility: OTHER | Age: 70
End: 2025-05-05

## 2025-05-05 NOTE — TELEPHONE ENCOUNTER
05/05/25 10:29 AM     Chart reviewed for Diabetic Eye Exam was/were not submitted to the patient's insurance.     Denita Lovelace MA   [facility]

## 2025-05-13 DIAGNOSIS — G47.61 PLMD (PERIODIC LIMB MOVEMENT DISORDER): ICD-10-CM

## 2025-05-13 RX ORDER — ROPINIROLE 0.5 MG/1
0.5 TABLET, FILM COATED ORAL
Qty: 30 TABLET | Refills: 5 | Status: SHIPPED | OUTPATIENT
Start: 2025-05-13 | End: 2025-11-09

## 2025-05-19 ENCOUNTER — OFFICE VISIT (OUTPATIENT)
Dept: INTERNAL MEDICINE CLINIC | Facility: CLINIC | Age: 70
End: 2025-05-19
Payer: COMMERCIAL

## 2025-05-19 VITALS
DIASTOLIC BLOOD PRESSURE: 70 MMHG | HEART RATE: 68 BPM | OXYGEN SATURATION: 98 % | WEIGHT: 172 LBS | SYSTOLIC BLOOD PRESSURE: 118 MMHG | BODY MASS INDEX: 26.07 KG/M2 | HEIGHT: 68 IN

## 2025-05-19 DIAGNOSIS — F41.9 ANXIETY: ICD-10-CM

## 2025-05-19 DIAGNOSIS — Z12.2 SCREENING FOR LUNG CANCER: ICD-10-CM

## 2025-05-19 DIAGNOSIS — E11.42 DIABETIC POLYNEUROPATHY ASSOCIATED WITH TYPE 2 DIABETES MELLITUS (HCC): ICD-10-CM

## 2025-05-19 DIAGNOSIS — Z12.11 SCREENING FOR COLON CANCER: ICD-10-CM

## 2025-05-19 DIAGNOSIS — Z59.41 FOOD INSECURITY: ICD-10-CM

## 2025-05-19 DIAGNOSIS — E11.42 TYPE 2 DIABETES MELLITUS WITH DIABETIC POLYNEUROPATHY, WITHOUT LONG-TERM CURRENT USE OF INSULIN (HCC): Primary | ICD-10-CM

## 2025-05-19 LAB — SL AMB POCT HEMOGLOBIN AIC: 9.3 (ref ?–6.5)

## 2025-05-19 PROCEDURE — 99213 OFFICE O/P EST LOW 20 MIN: CPT | Performed by: INTERNAL MEDICINE

## 2025-05-19 PROCEDURE — G2211 COMPLEX E/M VISIT ADD ON: HCPCS | Performed by: INTERNAL MEDICINE

## 2025-05-19 PROCEDURE — 83036 HEMOGLOBIN GLYCOSYLATED A1C: CPT | Performed by: INTERNAL MEDICINE

## 2025-05-19 RX ORDER — GABAPENTIN 300 MG/1
300 CAPSULE ORAL 2 TIMES DAILY
Qty: 180 CAPSULE | Refills: 1 | Status: SHIPPED | OUTPATIENT
Start: 2025-05-19

## 2025-05-19 RX ORDER — CLONAZEPAM 0.5 MG/1
0.5 TABLET ORAL 2 TIMES DAILY
Qty: 60 TABLET | Refills: 0 | Status: SHIPPED | OUTPATIENT
Start: 2025-05-19 | End: 2025-06-18

## 2025-05-19 SDOH — ECONOMIC STABILITY - FOOD INSECURITY: FOOD INSECURITY: Z59.41

## 2025-05-19 NOTE — ASSESSMENT & PLAN NOTE
Lab Results   Component Value Date    HGBA1C 9.3 (A) 05/19/2025       Orders:  •  POCT hemoglobin A1c  •  Diabetic foot exam; Future

## 2025-05-19 NOTE — PROGRESS NOTES
Diabetic Foot Exam    Patient's shoes and socks removed.    Right Foot/Ankle   Right Foot Inspection  Skin Exam: skin normal and skin intact. No dry skin, no warmth, no callus, no erythema, no maceration, no abnormal color, no pre-ulcer, no ulcer and no callus.     Toe Exam:  no right toe deformity    Sensory   Monofilament testing: intact    Vascular  Capillary refills: < 3 seconds  The right DP pulse is 2+. The right PT pulse is 2+.     Left Foot/Ankle  Left Foot Inspection  Skin Exam: skin normal and skin intact. No dry skin, no warmth, no erythema, no maceration, normal color, no pre-ulcer, no ulcer and no callus.     Toe Exam: No left toe deformity.     Sensory   Monofilament testing: intact    Vascular  Capillary refills: < 3 seconds  The left DP pulse is 2+. The left PT pulse is 2+.     Assign Risk Category  No deformity present  No loss of protective sensation  No weak pulses  Risk: 0      Name: Tanner Patel      : 1955      MRN: [id number]  Encounter Provider: Lisandro Berman DO  Encounter Date: 2025   Encounter department: [facility]  :  Assessment & Plan  Type 2 diabetes mellitus with diabetic polyneuropathy, without long-term current use of insulin ([facility])    Lab Results   Component Value Date    HGBA1C 9.3 (A) 2025       Orders:  •  POCT hemoglobin A1c  •  Diabetic foot exam; Future    Screening for colon cancer         Screening for lung cancer  I discussed with him that he is a candidate for lung cancer CT screening.     The following Shared Decision-Making points were covered:  Benefits of screening were discussed, including the rates of reduction in death from lung cancer and other causes.  Harms of screening were reviewed, including false positive tests, radiation exposure levels, risks of invasive procedures, risks of complications of screening, and risk of overdiagnosis.  I counseled on the importance of adherence to annual lung cancer LDCT screening, impact  "of co-morbidities, and ability or willingness to undergo diagnosis and treatment.  I counseled on the importance of maintaining abstinence as a former smoker or was counseled on the importance of smoking cessation if a current smoker    Review of Eligibility Criteria: He meets all of the criteria for Lung Cancer Screening.   He is 69 y.o.   He has 20 pack year tobacco history and is a current smoker or has quit within the past 15 years  He presents no signs or symptoms of lung cancer    After discussion, the patient decided to elect lung cancer screening.         Diabetic polyneuropathy associated with type 2 diabetes mellitus (HCC)    Lab Results   Component Value Date    HGBA1C 9.3 (A) 05/19/2025       Orders:  •  gabapentin (NEURONTIN) 300 mg capsule; Take 1 capsule (300 mg total) by mouth 2 (two) times a day    Anxiety    Orders:  •  clonazePAM (KlonoPIN) 0.5 mg tablet; Take 1 tablet (0.5 mg total) by mouth 2 (two) times a day    Food insecurity           Assessment & Plan          Depression Screening and Follow-up Plan: Patient was screened for depression during today's encounter. They screened negative with a PHQ-9 score of 0.        History of Present Illness   HPI  Review of Systems   Constitutional:  Negative for chills and fever.   HENT:  Negative for ear pain and sore throat.    Eyes:  Negative for pain and visual disturbance.   Respiratory:  Negative for cough and shortness of breath.    Cardiovascular:  Negative for chest pain and palpitations.   Gastrointestinal:  Negative for abdominal pain and vomiting.   Genitourinary:  Negative for dysuria and hematuria.   Musculoskeletal:  Negative for arthralgias and back pain.   Skin:  Negative for color change and rash.   Neurological:  Negative for seizures and syncope.   All other systems reviewed and are negative.      Objective   /70   Pulse 68   Ht 5' 8\" (1.727 m)   Wt 78 kg (172 lb)   SpO2 98%   BMI 26.15 kg/m²      Physical " Exam    Cardiovascular:      Pulses: no weak pulses.           Dorsalis pedis pulses are 2+ on the right side and 2+ on the left side.        Posterior tibial pulses are 2+ on the right side and 2+ on the left side.     Musculoskeletal:      Right lower leg: Edema present.      Left lower leg: Edema present.        Feet:    Feet:      Right foot:      Skin integrity: No ulcer, skin breakdown, erythema, warmth, callus or dry skin.      Left foot:      Skin integrity: No ulcer, skin breakdown, erythema, warmth, callus or dry skin.

## 2025-05-29 DIAGNOSIS — I10 ESSENTIAL HYPERTENSION: ICD-10-CM

## 2025-05-29 DIAGNOSIS — F33.1 MODERATE EPISODE OF RECURRENT MAJOR DEPRESSIVE DISORDER (HCC): ICD-10-CM

## 2025-05-29 RX ORDER — LISINOPRIL 40 MG/1
40 TABLET ORAL 2 TIMES DAILY
Qty: 180 TABLET | Refills: 1 | Status: SHIPPED | OUTPATIENT
Start: 2025-05-29 | End: 2025-11-25

## 2025-05-29 RX ORDER — ESCITALOPRAM OXALATE 20 MG/1
20 TABLET ORAL DAILY
Qty: 90 TABLET | Refills: 0 | Status: SHIPPED | OUTPATIENT
Start: 2025-05-29

## 2025-05-29 RX ORDER — AMLODIPINE BESYLATE 10 MG/1
10 TABLET ORAL DAILY
Qty: 90 TABLET | Refills: 1 | Status: SHIPPED | OUTPATIENT
Start: 2025-05-29

## 2025-07-09 ENCOUNTER — VBI (OUTPATIENT)
Dept: ADMINISTRATIVE | Facility: OTHER | Age: 70
End: 2025-07-09

## 2025-07-09 NOTE — TELEPHONE ENCOUNTER
07/09/25 10:04 AM     Chart reviewed for Diabetic Eye Exam was/were not submitted to the patient's insurance.     Denita Lovelace MA   [facility]